# Patient Record
Sex: MALE | Race: WHITE | NOT HISPANIC OR LATINO | URBAN - METROPOLITAN AREA
[De-identification: names, ages, dates, MRNs, and addresses within clinical notes are randomized per-mention and may not be internally consistent; named-entity substitution may affect disease eponyms.]

---

## 2021-01-01 ENCOUNTER — INPATIENT (INPATIENT)
Facility: HOSPITAL | Age: 66
LOS: 1 days | Discharge: HOME | DRG: 439 | End: 2021-07-04
Attending: FAMILY MEDICINE | Admitting: STUDENT IN AN ORGANIZED HEALTH CARE EDUCATION/TRAINING PROGRAM
Payer: COMMERCIAL

## 2021-01-01 ENCOUNTER — NON-APPOINTMENT (OUTPATIENT)
Age: 66
End: 2021-01-01

## 2021-01-01 ENCOUNTER — RESULT REVIEW (OUTPATIENT)
Age: 66
End: 2021-01-01

## 2021-01-01 ENCOUNTER — INPATIENT (INPATIENT)
Facility: HOSPITAL | Age: 66
LOS: 6 days | Discharge: HOME | End: 2021-08-13
Attending: SURGERY | Admitting: SURGERY
Payer: MEDICARE

## 2021-01-01 ENCOUNTER — APPOINTMENT (OUTPATIENT)
Dept: CARDIOLOGY | Facility: CLINIC | Age: 66
End: 2021-01-01

## 2021-01-01 ENCOUNTER — APPOINTMENT (OUTPATIENT)
Dept: CT IMAGING | Facility: CLINIC | Age: 66
End: 2021-01-01

## 2021-01-01 ENCOUNTER — TRANSCRIPTION ENCOUNTER (OUTPATIENT)
Age: 66
End: 2021-01-01

## 2021-01-01 ENCOUNTER — APPOINTMENT (OUTPATIENT)
Dept: SURGICAL ONCOLOGY | Facility: CLINIC | Age: 66
End: 2021-01-01

## 2021-01-01 ENCOUNTER — APPOINTMENT (OUTPATIENT)
Dept: DISASTER EMERGENCY | Facility: CLINIC | Age: 66
End: 2021-01-01

## 2021-01-01 ENCOUNTER — APPOINTMENT (OUTPATIENT)
Dept: SURGICAL ONCOLOGY | Facility: HOSPITAL | Age: 66
End: 2021-01-01

## 2021-01-01 ENCOUNTER — APPOINTMENT (OUTPATIENT)
Dept: CARDIOLOGY | Facility: CLINIC | Age: 66
End: 2021-01-01
Payer: MEDICARE

## 2021-01-01 ENCOUNTER — APPOINTMENT (OUTPATIENT)
Dept: HEMATOLOGY ONCOLOGY | Facility: CLINIC | Age: 66
End: 2021-01-01

## 2021-01-01 ENCOUNTER — APPOINTMENT (OUTPATIENT)
Dept: SURGICAL ONCOLOGY | Facility: CLINIC | Age: 66
End: 2021-01-01
Payer: MEDICARE

## 2021-01-01 ENCOUNTER — FORM ENCOUNTER (OUTPATIENT)
Age: 66
End: 2021-01-01

## 2021-01-01 ENCOUNTER — APPOINTMENT (OUTPATIENT)
Dept: HEMATOLOGY ONCOLOGY | Facility: CLINIC | Age: 66
End: 2021-01-01
Payer: MEDICARE

## 2021-01-01 ENCOUNTER — OUTPATIENT (OUTPATIENT)
Dept: OUTPATIENT SERVICES | Facility: HOSPITAL | Age: 66
LOS: 1 days | End: 2021-01-01

## 2021-01-01 ENCOUNTER — EMERGENCY (EMERGENCY)
Facility: HOSPITAL | Age: 66
LOS: 1 days | End: 2021-01-01
Payer: MEDICARE

## 2021-01-01 ENCOUNTER — OUTPATIENT (OUTPATIENT)
Dept: OUTPATIENT SERVICES | Facility: HOSPITAL | Age: 66
LOS: 1 days | End: 2021-01-01
Payer: COMMERCIAL

## 2021-01-01 ENCOUNTER — EMERGENCY (EMERGENCY)
Facility: HOSPITAL | Age: 66
LOS: 1 days | End: 2021-01-01
Admitting: EMERGENCY MEDICINE
Payer: MEDICARE

## 2021-01-01 ENCOUNTER — INPATIENT (INPATIENT)
Facility: HOSPITAL | Age: 66
LOS: 15 days | End: 2021-10-11
Attending: SURGERY | Admitting: SURGERY
Payer: MEDICARE

## 2021-01-01 VITALS
OXYGEN SATURATION: 98 % | SYSTOLIC BLOOD PRESSURE: 135 MMHG | RESPIRATION RATE: 16 BRPM | TEMPERATURE: 99 F | DIASTOLIC BLOOD PRESSURE: 86 MMHG | HEART RATE: 79 BPM

## 2021-01-01 VITALS
HEART RATE: 96 BPM | TEMPERATURE: 98 F | OXYGEN SATURATION: 100 % | WEIGHT: 161.6 LBS | RESPIRATION RATE: 18 BRPM | SYSTOLIC BLOOD PRESSURE: 108 MMHG | DIASTOLIC BLOOD PRESSURE: 76 MMHG

## 2021-01-01 VITALS
SYSTOLIC BLOOD PRESSURE: 142 MMHG | RESPIRATION RATE: 20 BRPM | HEART RATE: 76 BPM | WEIGHT: 192.02 LBS | TEMPERATURE: 98 F | HEIGHT: 66 IN | DIASTOLIC BLOOD PRESSURE: 83 MMHG | OXYGEN SATURATION: 98 %

## 2021-01-01 VITALS
SYSTOLIC BLOOD PRESSURE: 133 MMHG | WEIGHT: 165.04 LBS | OXYGEN SATURATION: 99 % | TEMPERATURE: 96.5 F | HEIGHT: 66 IN | DIASTOLIC BLOOD PRESSURE: 97 MMHG | HEART RATE: 94 BPM | BODY MASS INDEX: 26.52 KG/M2

## 2021-01-01 VITALS
BODY MASS INDEX: 27.4 KG/M2 | WEIGHT: 170.5 LBS | TEMPERATURE: 97.9 F | DIASTOLIC BLOOD PRESSURE: 109 MMHG | SYSTOLIC BLOOD PRESSURE: 154 MMHG | HEART RATE: 82 BPM | OXYGEN SATURATION: 96 % | HEIGHT: 66 IN

## 2021-01-01 VITALS
DIASTOLIC BLOOD PRESSURE: 70 MMHG | HEIGHT: 66 IN | OXYGEN SATURATION: 97 % | HEART RATE: 60 BPM | SYSTOLIC BLOOD PRESSURE: 130 MMHG | BODY MASS INDEX: 31.98 KG/M2 | WEIGHT: 199 LBS | TEMPERATURE: 97.7 F

## 2021-01-01 VITALS
DIASTOLIC BLOOD PRESSURE: 92 MMHG | SYSTOLIC BLOOD PRESSURE: 152 MMHG | HEART RATE: 70 BPM | WEIGHT: 194.01 LBS | TEMPERATURE: 99 F | OXYGEN SATURATION: 98 % | HEIGHT: 65 IN | RESPIRATION RATE: 16 BRPM

## 2021-01-01 VITALS
DIASTOLIC BLOOD PRESSURE: 84 MMHG | HEART RATE: 71 BPM | OXYGEN SATURATION: 96 % | HEIGHT: 66 IN | SYSTOLIC BLOOD PRESSURE: 127 MMHG | WEIGHT: 190 LBS | BODY MASS INDEX: 30.53 KG/M2 | TEMPERATURE: 97.2 F

## 2021-01-01 VITALS — HEART RATE: 84 BPM | DIASTOLIC BLOOD PRESSURE: 79 MMHG | SYSTOLIC BLOOD PRESSURE: 152 MMHG

## 2021-01-01 VITALS
HEIGHT: 66 IN | RESPIRATION RATE: 16 BRPM | SYSTOLIC BLOOD PRESSURE: 205 MMHG | TEMPERATURE: 99 F | DIASTOLIC BLOOD PRESSURE: 101 MMHG | WEIGHT: 171.74 LBS | OXYGEN SATURATION: 97 % | HEART RATE: 75 BPM

## 2021-01-01 VITALS
HEART RATE: 114 BPM | WEIGHT: 167 LBS | BODY MASS INDEX: 26.84 KG/M2 | HEIGHT: 66 IN | OXYGEN SATURATION: 94 % | DIASTOLIC BLOOD PRESSURE: 98 MMHG | SYSTOLIC BLOOD PRESSURE: 150 MMHG | RESPIRATION RATE: 14 BRPM | TEMPERATURE: 99.6 F

## 2021-01-01 VITALS — OXYGEN SATURATION: 96 % | RESPIRATION RATE: 42 BRPM | HEART RATE: 116 BPM

## 2021-01-01 DIAGNOSIS — F41.9 ANXIETY DISORDER, UNSPECIFIED: ICD-10-CM

## 2021-01-01 DIAGNOSIS — Z01.810 ENCOUNTER FOR PREPROCEDURAL CARDIOVASCULAR EXAMINATION: ICD-10-CM

## 2021-01-01 DIAGNOSIS — Z87.442 PERSONAL HISTORY OF URINARY CALCULI: ICD-10-CM

## 2021-01-01 DIAGNOSIS — Z78.9 OTHER SPECIFIED HEALTH STATUS: ICD-10-CM

## 2021-01-01 DIAGNOSIS — K86.89 OTHER SPECIFIED DISEASES OF PANCREAS: ICD-10-CM

## 2021-01-01 DIAGNOSIS — K59.00 CONSTIPATION, UNSPECIFIED: ICD-10-CM

## 2021-01-01 DIAGNOSIS — Z98.890 OTHER SPECIFIED POSTPROCEDURAL STATES: Chronic | ICD-10-CM

## 2021-01-01 DIAGNOSIS — Z83.3 FAMILY HISTORY OF DIABETES MELLITUS: ICD-10-CM

## 2021-01-01 DIAGNOSIS — R11.2 NAUSEA WITH VOMITING, UNSPECIFIED: ICD-10-CM

## 2021-01-01 DIAGNOSIS — I10 ESSENTIAL (PRIMARY) HYPERTENSION: ICD-10-CM

## 2021-01-01 DIAGNOSIS — Z82.49 FAMILY HISTORY OF ISCHEMIC HEART DISEASE AND OTHER DISEASES OF THE CIRCULATORY SYSTEM: ICD-10-CM

## 2021-01-01 DIAGNOSIS — B19.20 UNSPECIFIED VIRAL HEPATITIS C W/OUT HEPATIC COMA: ICD-10-CM

## 2021-01-01 DIAGNOSIS — G93.49 OTHER ENCEPHALOPATHY: ICD-10-CM

## 2021-01-01 DIAGNOSIS — C25.0 MALIGNANT NEOPLASM OF HEAD OF PANCREAS: ICD-10-CM

## 2021-01-01 DIAGNOSIS — Z01.818 ENCOUNTER FOR OTHER PREPROCEDURAL EXAMINATION: ICD-10-CM

## 2021-01-01 DIAGNOSIS — R78.81 BACTEREMIA: ICD-10-CM

## 2021-01-01 DIAGNOSIS — Z86.19 PERSONAL HISTORY OF OTHER INFECTIOUS AND PARASITIC DISEASES: ICD-10-CM

## 2021-01-01 DIAGNOSIS — E44.0 MODERATE PROTEIN-CALORIE MALNUTRITION: ICD-10-CM

## 2021-01-01 DIAGNOSIS — Z51.5 ENCOUNTER FOR PALLIATIVE CARE: ICD-10-CM

## 2021-01-01 DIAGNOSIS — E43 UNSPECIFIED SEVERE PROTEIN-CALORIE MALNUTRITION: ICD-10-CM

## 2021-01-01 DIAGNOSIS — C25.1 MALIGNANT NEOPLASM OF BODY OF PANCREAS: ICD-10-CM

## 2021-01-01 DIAGNOSIS — C25.9 MALIGNANT NEOPLASM OF PANCREAS, UNSPECIFIED: ICD-10-CM

## 2021-01-01 DIAGNOSIS — D49.0 NEOPLASM OF UNSPECIFIED BEHAVIOR OF DIGESTIVE SYSTEM: ICD-10-CM

## 2021-01-01 DIAGNOSIS — Z81.1 FAMILY HISTORY OF ALCOHOL ABUSE AND DEPENDENCE: ICD-10-CM

## 2021-01-01 DIAGNOSIS — R53.81 OTHER MALAISE: ICD-10-CM

## 2021-01-01 DIAGNOSIS — E83.52 HYPERCALCEMIA: ICD-10-CM

## 2021-01-01 DIAGNOSIS — K83.1 OBSTRUCTION OF BILE DUCT: ICD-10-CM

## 2021-01-01 DIAGNOSIS — Z87.891 PERSONAL HISTORY OF NICOTINE DEPENDENCE: ICD-10-CM

## 2021-01-01 DIAGNOSIS — K29.70 GASTRITIS, UNSPECIFIED, W/OUT BLEEDING: ICD-10-CM

## 2021-01-01 DIAGNOSIS — R17 UNSPECIFIED JAUNDICE: ICD-10-CM

## 2021-01-01 DIAGNOSIS — Z71.89 OTHER SPECIFIED COUNSELING: ICD-10-CM

## 2021-01-01 DIAGNOSIS — A41.9 SEPSIS, UNSPECIFIED ORGANISM: ICD-10-CM

## 2021-01-01 DIAGNOSIS — K83.09 OTHER CHOLANGITIS: ICD-10-CM

## 2021-01-01 DIAGNOSIS — B96.81 GASTRITIS, UNSPECIFIED, W/OUT BLEEDING: ICD-10-CM

## 2021-01-01 DIAGNOSIS — R06.82 TACHYPNEA, NOT ELSEWHERE CLASSIFIED: ICD-10-CM

## 2021-01-01 DIAGNOSIS — Z90.410 ACQUIRED TOTAL ABSENCE OF PANCREAS: Chronic | ICD-10-CM

## 2021-01-01 DIAGNOSIS — Z86.79 PERSONAL HISTORY OF OTHER DISEASES OF THE CIRCULATORY SYSTEM: ICD-10-CM

## 2021-01-01 DIAGNOSIS — G47.00 INSOMNIA, UNSPECIFIED: ICD-10-CM

## 2021-01-01 DIAGNOSIS — R53.2 FUNCTIONAL QUADRIPLEGIA: ICD-10-CM

## 2021-01-01 LAB
-  AMIKACIN: SIGNIFICANT CHANGE UP
-  AMOXICILLIN/CLAVULANIC ACID: SIGNIFICANT CHANGE UP
-  AMPHOTERICIN B: SIGNIFICANT CHANGE UP
-  AMPICILLIN/SULBACTAM: SIGNIFICANT CHANGE UP
-  AMPICILLIN: SIGNIFICANT CHANGE UP
-  ANIDULAFUNGIN: SIGNIFICANT CHANGE UP
-  AZTREONAM: SIGNIFICANT CHANGE UP
-  CASPOFUNGIN: SIGNIFICANT CHANGE UP
-  CEFAZOLIN: SIGNIFICANT CHANGE UP
-  CEFEPIME: SIGNIFICANT CHANGE UP
-  CEFOXITIN: SIGNIFICANT CHANGE UP
-  CEFTRIAXONE: SIGNIFICANT CHANGE UP
-  CIPROFLOXACIN: SIGNIFICANT CHANGE UP
-  ERTAPENEM: SIGNIFICANT CHANGE UP
-  FLUCONAZOLE: SIGNIFICANT CHANGE UP
-  GENTAMICIN: SIGNIFICANT CHANGE UP
-  IMIPENEM: SIGNIFICANT CHANGE UP
-  INTERPRETATION: SIGNIFICANT CHANGE UP
-  LEVOFLOXACIN: SIGNIFICANT CHANGE UP
-  MEROPENEM: SIGNIFICANT CHANGE UP
-  MICAFUNGIN: SIGNIFICANT CHANGE UP
-  PIPERACILLIN/TAZOBACTAM: SIGNIFICANT CHANGE UP
-  POSACONAZOLE: SIGNIFICANT CHANGE UP
-  TOBRAMYCIN: SIGNIFICANT CHANGE UP
-  TRIMETHOPRIM/SULFAMETHOXAZOLE: SIGNIFICANT CHANGE UP
-  VORICONAZOLE: SIGNIFICANT CHANGE UP
24R-OH-CALCIDIOL SERPL-MCNC: 14.8 NG/ML — LOW (ref 30–80)
ALBUMIN SERPL ELPH-MCNC: 2 G/DL — LOW (ref 3.3–5)
ALBUMIN SERPL ELPH-MCNC: 2.1 G/DL — LOW (ref 3.3–5)
ALBUMIN SERPL ELPH-MCNC: 2.1 G/DL — LOW (ref 3.3–5)
ALBUMIN SERPL ELPH-MCNC: 2.2 G/DL — LOW (ref 3.3–5)
ALBUMIN SERPL ELPH-MCNC: 2.2 G/DL — LOW (ref 3.3–5)
ALBUMIN SERPL ELPH-MCNC: 2.4 G/DL — LOW (ref 3.3–5)
ALBUMIN SERPL ELPH-MCNC: 2.5 G/DL — LOW (ref 3.3–5)
ALBUMIN SERPL ELPH-MCNC: 2.6 G/DL — LOW (ref 3.3–5)
ALBUMIN SERPL ELPH-MCNC: 2.7 G/DL — LOW (ref 3.3–5)
ALBUMIN SERPL ELPH-MCNC: 3 G/DL — LOW (ref 3.3–5)
ALBUMIN SERPL ELPH-MCNC: 3.2 G/DL — LOW (ref 3.3–5)
ALBUMIN SERPL ELPH-MCNC: 3.3 G/DL — SIGNIFICANT CHANGE UP (ref 3.3–5)
ALBUMIN SERPL ELPH-MCNC: 3.3 G/DL — SIGNIFICANT CHANGE UP (ref 3.3–5)
ALBUMIN SERPL ELPH-MCNC: 3.4 G/DL — SIGNIFICANT CHANGE UP (ref 3.3–5)
ALBUMIN SERPL ELPH-MCNC: 3.7 G/DL — SIGNIFICANT CHANGE UP (ref 3.3–5.2)
ALBUMIN SERPL ELPH-MCNC: 3.9 G/DL — SIGNIFICANT CHANGE UP (ref 3.3–5)
ALBUMIN SERPL ELPH-MCNC: 3.9 G/DL — SIGNIFICANT CHANGE UP (ref 3.3–5.2)
ALBUMIN SERPL ELPH-MCNC: 4.2 G/DL — SIGNIFICANT CHANGE UP (ref 3.3–5)
ALBUMIN SERPL ELPH-MCNC: 4.2 G/DL — SIGNIFICANT CHANGE UP (ref 3.3–5.2)
ALBUMIN SERPL ELPH-MCNC: 4.3 G/DL — SIGNIFICANT CHANGE UP (ref 3.3–5)
ALBUMIN SERPL ELPH-MCNC: 4.8 G/DL — SIGNIFICANT CHANGE UP (ref 3.3–5)
ALP SERPL-CCNC: 109 U/L — SIGNIFICANT CHANGE UP (ref 40–120)
ALP SERPL-CCNC: 111 U/L — SIGNIFICANT CHANGE UP (ref 40–120)
ALP SERPL-CCNC: 111 U/L — SIGNIFICANT CHANGE UP (ref 40–120)
ALP SERPL-CCNC: 113 U/L — SIGNIFICANT CHANGE UP (ref 40–120)
ALP SERPL-CCNC: 114 U/L — SIGNIFICANT CHANGE UP (ref 40–120)
ALP SERPL-CCNC: 117 U/L — SIGNIFICANT CHANGE UP (ref 40–120)
ALP SERPL-CCNC: 118 U/L — SIGNIFICANT CHANGE UP (ref 40–120)
ALP SERPL-CCNC: 121 U/L — HIGH (ref 40–120)
ALP SERPL-CCNC: 122 U/L — HIGH (ref 40–120)
ALP SERPL-CCNC: 126 U/L — HIGH (ref 40–120)
ALP SERPL-CCNC: 132 U/L — HIGH (ref 40–120)
ALP SERPL-CCNC: 133 U/L — HIGH (ref 40–120)
ALP SERPL-CCNC: 155 U/L — HIGH (ref 40–120)
ALP SERPL-CCNC: 163 U/L — HIGH (ref 40–120)
ALP SERPL-CCNC: 180 U/L — HIGH (ref 40–120)
ALP SERPL-CCNC: 199 U/L — HIGH (ref 40–120)
ALP SERPL-CCNC: 205 U/L — HIGH (ref 40–120)
ALP SERPL-CCNC: 264 U/L — HIGH (ref 40–120)
ALP SERPL-CCNC: 267 U/L — HIGH (ref 40–120)
ALP SERPL-CCNC: 268 U/L — HIGH (ref 40–120)
ALP SERPL-CCNC: 287 U/L — HIGH (ref 40–120)
ALP SERPL-CCNC: 54 U/L — SIGNIFICANT CHANGE UP (ref 40–120)
ALP SERPL-CCNC: 56 U/L — SIGNIFICANT CHANGE UP (ref 40–120)
ALP SERPL-CCNC: 59 U/L — SIGNIFICANT CHANGE UP (ref 40–120)
ALP SERPL-CCNC: 60 U/L — SIGNIFICANT CHANGE UP (ref 40–120)
ALP SERPL-CCNC: 65 U/L — SIGNIFICANT CHANGE UP (ref 40–120)
ALP SERPL-CCNC: 80 U/L — SIGNIFICANT CHANGE UP (ref 40–120)
ALP SERPL-CCNC: 89 U/L — SIGNIFICANT CHANGE UP (ref 40–120)
ALP SERPL-CCNC: 96 U/L — SIGNIFICANT CHANGE UP (ref 40–120)
ALT FLD-CCNC: 106 U/L — HIGH (ref 4–41)
ALT FLD-CCNC: 11 U/L — SIGNIFICANT CHANGE UP (ref 10–45)
ALT FLD-CCNC: 114 U/L — HIGH (ref 4–41)
ALT FLD-CCNC: 115 U/L — HIGH (ref 4–41)
ALT FLD-CCNC: 129 U/L — HIGH (ref 4–41)
ALT FLD-CCNC: 142 U/L — HIGH (ref 4–41)
ALT FLD-CCNC: 184 U/L — HIGH (ref 4–41)
ALT FLD-CCNC: 184 U/L — HIGH (ref 4–41)
ALT FLD-CCNC: 239 U/L — HIGH (ref 4–41)
ALT FLD-CCNC: 242 U/L — HIGH (ref 4–41)
ALT FLD-CCNC: 247 U/L — HIGH (ref 4–41)
ALT FLD-CCNC: 309 U/L — HIGH (ref 4–41)
ALT FLD-CCNC: 315 U/L — HIGH (ref 4–41)
ALT FLD-CCNC: 363 U/L — HIGH (ref 4–41)
ALT FLD-CCNC: 38 U/L — SIGNIFICANT CHANGE UP (ref 4–41)
ALT FLD-CCNC: 385 U/L — HIGH (ref 4–41)
ALT FLD-CCNC: 388 U/L — HIGH (ref 4–41)
ALT FLD-CCNC: 39 U/L — SIGNIFICANT CHANGE UP (ref 4–41)
ALT FLD-CCNC: 43 U/L — HIGH (ref 4–41)
ALT FLD-CCNC: 508 U/L — HIGH
ALT FLD-CCNC: 51 U/L — HIGH (ref 4–41)
ALT FLD-CCNC: 530 U/L — HIGH (ref 4–41)
ALT FLD-CCNC: 54 U/L — HIGH (ref 4–41)
ALT FLD-CCNC: 549 U/L — HIGH (ref 4–41)
ALT FLD-CCNC: 622 U/L — HIGH
ALT FLD-CCNC: 63 U/L — HIGH (ref 4–41)
ALT FLD-CCNC: 69 U/L — HIGH (ref 4–41)
ALT FLD-CCNC: 736 U/L — HIGH
ALT FLD-CCNC: 89 U/L — HIGH (ref 4–41)
ALT FLD-CCNC: 91 U/L — HIGH (ref 4–41)
ALT FLD-CCNC: 93 U/L — HIGH (ref 4–41)
AMMONIA BLD-MCNC: 38 UMOL/L — SIGNIFICANT CHANGE UP (ref 11–55)
AMYLASE FLD-CCNC: 12 U/L — SIGNIFICANT CHANGE UP
AMYLASE FLD-CCNC: 22 U/L — SIGNIFICANT CHANGE UP
AMYLASE FLD-CCNC: 66 U/L — SIGNIFICANT CHANGE UP
ANION GAP SERPL CALC-SCNC: 10 MMOL/L — SIGNIFICANT CHANGE UP (ref 7–14)
ANION GAP SERPL CALC-SCNC: 11 MMOL/L — SIGNIFICANT CHANGE UP (ref 7–14)
ANION GAP SERPL CALC-SCNC: 12 MMOL/L — SIGNIFICANT CHANGE UP (ref 5–17)
ANION GAP SERPL CALC-SCNC: 12 MMOL/L — SIGNIFICANT CHANGE UP (ref 7–14)
ANION GAP SERPL CALC-SCNC: 13 MMOL/L — SIGNIFICANT CHANGE UP (ref 5–17)
ANION GAP SERPL CALC-SCNC: 13 MMOL/L — SIGNIFICANT CHANGE UP (ref 5–17)
ANION GAP SERPL CALC-SCNC: 13 MMOL/L — SIGNIFICANT CHANGE UP (ref 7–14)
ANION GAP SERPL CALC-SCNC: 14 MMOL/L — SIGNIFICANT CHANGE UP (ref 7–14)
ANION GAP SERPL CALC-SCNC: 14 MMOL/L — SIGNIFICANT CHANGE UP (ref 7–14)
ANION GAP SERPL CALC-SCNC: 15 MMOL/L — HIGH (ref 7–14)
ANION GAP SERPL CALC-SCNC: 16 MMOL/L — SIGNIFICANT CHANGE UP (ref 5–17)
ANION GAP SERPL CALC-SCNC: 17 MMOL/L — HIGH (ref 7–14)
ANION GAP SERPL CALC-SCNC: 18 MMOL/L — HIGH (ref 7–14)
ANION GAP SERPL CALC-SCNC: 18 MMOL/L — HIGH (ref 7–14)
ANION GAP SERPL CALC-SCNC: 19 MMOL/L — HIGH (ref 7–14)
ANION GAP SERPL CALC-SCNC: 20 MMOL/L — HIGH (ref 7–14)
ANION GAP SERPL CALC-SCNC: 20 MMOL/L — HIGH (ref 7–14)
ANION GAP SERPL CALC-SCNC: 22 MMOL/L — HIGH (ref 7–14)
ANION GAP SERPL CALC-SCNC: 24 MMOL/L — HIGH (ref 7–14)
ANION GAP SERPL CALC-SCNC: 24 MMOL/L — HIGH (ref 7–14)
ANION GAP SERPL CALC-SCNC: 7 MMOL/L — SIGNIFICANT CHANGE UP (ref 7–14)
ANION GAP SERPL CALC-SCNC: 8 MMOL/L — SIGNIFICANT CHANGE UP (ref 7–14)
ANISOCYTOSIS BLD QL: SLIGHT — SIGNIFICANT CHANGE UP
APTT BLD: 23.7 SEC — LOW (ref 27–36.3)
APTT BLD: 24.1 SEC — SIGNIFICANT CHANGE UP (ref 27–36.3)
APTT BLD: 24.8 SEC — LOW (ref 27–36.3)
APTT BLD: 25.2 SEC — LOW (ref 27–36.3)
APTT BLD: 25.2 SEC — LOW (ref 27–36.3)
APTT BLD: 25.8 SEC — LOW (ref 27–36.3)
APTT BLD: 28.7 SEC — SIGNIFICANT CHANGE UP (ref 27–36.3)
APTT BLD: 28.8 SEC — SIGNIFICANT CHANGE UP (ref 27–36.3)
APTT BLD: 28.9 SEC — SIGNIFICANT CHANGE UP (ref 27–36.3)
APTT BLD: 29 SEC — SIGNIFICANT CHANGE UP (ref 27–36.3)
APTT BLD: 30.7 SEC — SIGNIFICANT CHANGE UP (ref 27–36.3)
APTT BLD: 32.4 SEC — SIGNIFICANT CHANGE UP (ref 27–36.3)
APTT BLD: 32.5 SEC — SIGNIFICANT CHANGE UP (ref 27.5–35.5)
APTT BLD: 33.5 SEC — SIGNIFICANT CHANGE UP (ref 27.5–35.5)
AST SERPL-CCNC: 106 U/L — HIGH (ref 4–40)
AST SERPL-CCNC: 159 U/L — HIGH (ref 4–40)
AST SERPL-CCNC: 164 U/L — HIGH (ref 4–40)
AST SERPL-CCNC: 166 U/L — HIGH (ref 4–40)
AST SERPL-CCNC: 17 U/L — SIGNIFICANT CHANGE UP (ref 10–40)
AST SERPL-CCNC: 192 U/L — HIGH (ref 4–40)
AST SERPL-CCNC: 220 U/L — HIGH (ref 4–40)
AST SERPL-CCNC: 225 U/L — HIGH (ref 4–40)
AST SERPL-CCNC: 246 U/L — HIGH (ref 4–40)
AST SERPL-CCNC: 247 U/L — HIGH (ref 4–40)
AST SERPL-CCNC: 262 U/L — HIGH
AST SERPL-CCNC: 333 U/L — HIGH (ref 4–40)
AST SERPL-CCNC: 335 U/L — HIGH (ref 4–40)
AST SERPL-CCNC: 35 U/L — SIGNIFICANT CHANGE UP (ref 4–40)
AST SERPL-CCNC: 377 U/L — HIGH (ref 4–40)
AST SERPL-CCNC: 388 U/L — HIGH (ref 4–40)
AST SERPL-CCNC: 39 U/L — SIGNIFICANT CHANGE UP (ref 4–40)
AST SERPL-CCNC: 393 U/L — HIGH
AST SERPL-CCNC: 434 U/L — HIGH
AST SERPL-CCNC: 449 U/L — HIGH (ref 4–40)
AST SERPL-CCNC: 468 U/L — HIGH (ref 4–40)
AST SERPL-CCNC: 49 U/L — HIGH (ref 4–40)
AST SERPL-CCNC: 49 U/L — HIGH (ref 4–40)
AST SERPL-CCNC: 501 U/L — HIGH (ref 4–40)
AST SERPL-CCNC: 51 U/L — HIGH (ref 4–40)
AST SERPL-CCNC: 52 U/L — HIGH (ref 4–40)
AST SERPL-CCNC: 678 U/L — HIGH (ref 4–40)
AST SERPL-CCNC: 70 U/L — HIGH (ref 4–40)
AST SERPL-CCNC: 72 U/L — HIGH (ref 4–40)
AST SERPL-CCNC: 77 U/L — HIGH (ref 4–40)
AST SERPL-CCNC: 86 U/L — HIGH (ref 4–40)
BASOPHILS # BLD AUTO: 0 K/UL — SIGNIFICANT CHANGE UP (ref 0–0.2)
BASOPHILS # BLD AUTO: 0.03 K/UL — SIGNIFICANT CHANGE UP (ref 0–0.2)
BASOPHILS # BLD AUTO: 0.04 K/UL — SIGNIFICANT CHANGE UP (ref 0–0.2)
BASOPHILS # BLD AUTO: 0.04 K/UL — SIGNIFICANT CHANGE UP (ref 0–0.2)
BASOPHILS # BLD AUTO: 0.06 K/UL — SIGNIFICANT CHANGE UP (ref 0–0.2)
BASOPHILS NFR BLD AUTO: 0 % — SIGNIFICANT CHANGE UP (ref 0–2)
BASOPHILS NFR BLD AUTO: 0.1 % — SIGNIFICANT CHANGE UP (ref 0–2)
BASOPHILS NFR BLD AUTO: 0.1 % — SIGNIFICANT CHANGE UP (ref 0–2)
BASOPHILS NFR BLD AUTO: 0.2 % — SIGNIFICANT CHANGE UP (ref 0–2)
BASOPHILS NFR BLD AUTO: 0.3 % — SIGNIFICANT CHANGE UP (ref 0–2)
BASOPHILS NFR BLD AUTO: 0.7 % — SIGNIFICANT CHANGE UP (ref 0–2)
BASOPHILS NFR BLD AUTO: 0.9 % — SIGNIFICANT CHANGE UP (ref 0–2)
BILIRUB DIRECT SERPL-MCNC: 3.2 MG/DL — HIGH (ref 0–0.2)
BILIRUB DIRECT SERPL-MCNC: 4.2 MG/DL — HIGH (ref 0–0.2)
BILIRUB DIRECT SERPL-MCNC: 4.4 MG/DL — HIGH (ref 0–0.2)
BILIRUB DIRECT SERPL-MCNC: 4.7 MG/DL — HIGH (ref 0–0.2)
BILIRUB DIRECT SERPL-MCNC: 7 MG/DL — HIGH (ref 0–0.2)
BILIRUB DIRECT SERPL-MCNC: 7.9 MG/DL — HIGH (ref 0–0.3)
BILIRUB INDIRECT FLD-MCNC: 1.4 MG/DL — HIGH (ref 0–1)
BILIRUB INDIRECT FLD-MCNC: 1.7 MG/DL — HIGH (ref 0–1)
BILIRUB INDIRECT FLD-MCNC: 1.8 MG/DL — HIGH (ref 0–1)
BILIRUB INDIRECT FLD-MCNC: 2.9 MG/DL — HIGH (ref 0–1)
BILIRUB INDIRECT FLD-MCNC: 3.4 MG/DL — HIGH (ref 0.2–1)
BILIRUB SERPL-MCNC: 1 MG/DL — SIGNIFICANT CHANGE UP (ref 0.2–1.2)
BILIRUB SERPL-MCNC: 1.1 MG/DL — SIGNIFICANT CHANGE UP (ref 0.2–1.2)
BILIRUB SERPL-MCNC: 1.2 MG/DL — SIGNIFICANT CHANGE UP (ref 0.2–1.2)
BILIRUB SERPL-MCNC: 1.2 MG/DL — SIGNIFICANT CHANGE UP (ref 0.2–1.2)
BILIRUB SERPL-MCNC: 1.5 MG/DL — HIGH (ref 0.2–1.2)
BILIRUB SERPL-MCNC: 1.8 MG/DL — HIGH (ref 0.2–1.2)
BILIRUB SERPL-MCNC: 10.5 MG/DL — HIGH (ref 0.2–1.2)
BILIRUB SERPL-MCNC: 10.6 MG/DL — HIGH (ref 0.2–1.2)
BILIRUB SERPL-MCNC: 11.2 MG/DL — HIGH (ref 0.4–2)
BILIRUB SERPL-MCNC: 2.1 MG/DL — HIGH (ref 0.2–1.2)
BILIRUB SERPL-MCNC: 2.6 MG/DL — HIGH (ref 0.2–1.2)
BILIRUB SERPL-MCNC: 5.1 MG/DL — HIGH (ref 0.4–2)
BILIRUB SERPL-MCNC: 5.3 MG/DL — HIGH (ref 0.2–1.2)
BILIRUB SERPL-MCNC: 5.6 MG/DL — HIGH (ref 0.2–1.2)
BILIRUB SERPL-MCNC: 5.7 MG/DL — HIGH (ref 0.2–1.2)
BILIRUB SERPL-MCNC: 5.8 MG/DL — HIGH (ref 0.2–1.2)
BILIRUB SERPL-MCNC: 6.1 MG/DL — HIGH (ref 0.2–1.2)
BILIRUB SERPL-MCNC: 6.2 MG/DL — HIGH (ref 0.2–1.2)
BILIRUB SERPL-MCNC: 6.2 MG/DL — HIGH (ref 0.2–1.2)
BILIRUB SERPL-MCNC: 6.4 MG/DL — HIGH (ref 0.2–1.2)
BILIRUB SERPL-MCNC: 6.5 MG/DL — HIGH (ref 0.2–1.2)
BILIRUB SERPL-MCNC: 6.7 MG/DL — HIGH (ref 0.2–1.2)
BILIRUB SERPL-MCNC: 7.2 MG/DL — HIGH (ref 0.2–1.2)
BILIRUB SERPL-MCNC: 7.4 MG/DL — HIGH (ref 0.2–1.2)
BILIRUB SERPL-MCNC: 7.7 MG/DL — HIGH (ref 0.2–1.2)
BILIRUB SERPL-MCNC: 8 MG/DL — HIGH (ref 0.2–1.2)
BILIRUB SERPL-MCNC: 8.3 MG/DL — HIGH (ref 0.2–1.2)
BILIRUB SERPL-MCNC: 8.6 MG/DL — HIGH (ref 0.2–1.2)
BILIRUB SERPL-MCNC: 9.4 MG/DL — HIGH (ref 0.4–2)
BLD GP AB SCN SERPL QL: NEGATIVE — SIGNIFICANT CHANGE UP
BLD GP AB SCN SERPL QL: SIGNIFICANT CHANGE UP
BLOOD GAS ARTERIAL - LYTES,HGB,ICA,LACT RESULT: SIGNIFICANT CHANGE UP
BLOOD GAS ARTERIAL - LYTES,HGB,ICA,LACT RESULT: SIGNIFICANT CHANGE UP
BLOOD GAS ARTERIAL COMPREHENSIVE RESULT: SIGNIFICANT CHANGE UP
BLOOD GAS ARTERIAL COMPREHENSIVE RESULT: SIGNIFICANT CHANGE UP
BUN SERPL-MCNC: 10 MG/DL — SIGNIFICANT CHANGE UP (ref 7–23)
BUN SERPL-MCNC: 11 MG/DL — SIGNIFICANT CHANGE UP (ref 7–23)
BUN SERPL-MCNC: 12 MG/DL — SIGNIFICANT CHANGE UP (ref 7–23)
BUN SERPL-MCNC: 12 MG/DL — SIGNIFICANT CHANGE UP (ref 7–23)
BUN SERPL-MCNC: 12.8 MG/DL — SIGNIFICANT CHANGE UP (ref 8–20)
BUN SERPL-MCNC: 13 MG/DL — SIGNIFICANT CHANGE UP (ref 7–23)
BUN SERPL-MCNC: 14 MG/DL — SIGNIFICANT CHANGE UP (ref 7–23)
BUN SERPL-MCNC: 14.2 MG/DL — SIGNIFICANT CHANGE UP (ref 8–20)
BUN SERPL-MCNC: 15 MG/DL — SIGNIFICANT CHANGE UP (ref 7–23)
BUN SERPL-MCNC: 15 MG/DL — SIGNIFICANT CHANGE UP (ref 7–23)
BUN SERPL-MCNC: 16 MG/DL — SIGNIFICANT CHANGE UP (ref 7–23)
BUN SERPL-MCNC: 16 MG/DL — SIGNIFICANT CHANGE UP (ref 7–23)
BUN SERPL-MCNC: 17 MG/DL — SIGNIFICANT CHANGE UP (ref 7–23)
BUN SERPL-MCNC: 17 MG/DL — SIGNIFICANT CHANGE UP (ref 7–23)
BUN SERPL-MCNC: 17.9 MG/DL — SIGNIFICANT CHANGE UP (ref 8–20)
BUN SERPL-MCNC: 18 MG/DL — SIGNIFICANT CHANGE UP (ref 7–23)
BUN SERPL-MCNC: 18 MG/DL — SIGNIFICANT CHANGE UP (ref 7–23)
BUN SERPL-MCNC: 19 MG/DL — SIGNIFICANT CHANGE UP (ref 7–23)
BUN SERPL-MCNC: 20 MG/DL — SIGNIFICANT CHANGE UP (ref 7–23)
BUN SERPL-MCNC: 22 MG/DL — SIGNIFICANT CHANGE UP (ref 7–23)
BUN SERPL-MCNC: 25 MG/DL — HIGH (ref 7–23)
BUN SERPL-MCNC: 5 MG/DL — LOW (ref 7–23)
BUN SERPL-MCNC: 6 MG/DL — LOW (ref 7–23)
BUN SERPL-MCNC: 6 MG/DL — LOW (ref 7–23)
BUN SERPL-MCNC: 7 MG/DL — SIGNIFICANT CHANGE UP (ref 7–23)
BUN SERPL-MCNC: 7 MG/DL — SIGNIFICANT CHANGE UP (ref 7–23)
BUN SERPL-MCNC: 8 MG/DL — SIGNIFICANT CHANGE UP (ref 7–23)
BUN SERPL-MCNC: 9 MG/DL — SIGNIFICANT CHANGE UP (ref 7–23)
CA-I BLD-SCNC: 1.66 MMOL/L — HIGH (ref 1.15–1.29)
CA-I BLD-SCNC: 1.8 MMOL/L — HIGH (ref 1.15–1.29)
CA-I BLD-SCNC: 1.83 MMOL/L — HIGH (ref 1.15–1.29)
CA-I BLD-SCNC: 1.87 MMOL/L — HIGH (ref 1.15–1.29)
CA-I BLD-SCNC: 2.03 MMOL/L — HIGH (ref 1.15–1.29)
CA-I BLD-SCNC: 2.13 MMOL/L — HIGH (ref 1.15–1.29)
CALCIUM SERPL-MCNC: 10.3 MG/DL — SIGNIFICANT CHANGE UP (ref 8.4–10.5)
CALCIUM SERPL-MCNC: 10.7 MG/DL — HIGH (ref 8.4–10.5)
CALCIUM SERPL-MCNC: 11.7 MG/DL — HIGH (ref 8.4–10.5)
CALCIUM SERPL-MCNC: 11.8 MG/DL — HIGH (ref 8.4–10.5)
CALCIUM SERPL-MCNC: 11.8 MG/DL — HIGH (ref 8.4–10.5)
CALCIUM SERPL-MCNC: 12.3 MG/DL — HIGH (ref 8.4–10.5)
CALCIUM SERPL-MCNC: 12.4 MG/DL — HIGH (ref 8.4–10.5)
CALCIUM SERPL-MCNC: 12.4 MG/DL — HIGH (ref 8.4–10.5)
CALCIUM SERPL-MCNC: 12.4 — SIGNIFICANT CHANGE UP
CALCIUM SERPL-MCNC: 12.5 MG/DL — HIGH (ref 8.4–10.5)
CALCIUM SERPL-MCNC: 12.6 MG/DL — HIGH (ref 8.4–10.5)
CALCIUM SERPL-MCNC: 13 MG/DL — CRITICAL HIGH (ref 8.4–10.5)
CALCIUM SERPL-MCNC: 13.1 MG/DL — CRITICAL HIGH (ref 8.4–10.5)
CALCIUM SERPL-MCNC: 13.2 MG/DL — CRITICAL HIGH (ref 8.4–10.5)
CALCIUM SERPL-MCNC: 13.3 MG/DL — CRITICAL HIGH (ref 8.4–10.5)
CALCIUM SERPL-MCNC: 13.3 MG/DL — CRITICAL HIGH (ref 8.4–10.5)
CALCIUM SERPL-MCNC: 13.5 MG/DL — CRITICAL HIGH (ref 8.4–10.5)
CALCIUM SERPL-MCNC: 13.8 MG/DL — CRITICAL HIGH (ref 8.4–10.5)
CALCIUM SERPL-MCNC: 14.3 MG/DL — CRITICAL HIGH (ref 8.4–10.5)
CALCIUM SERPL-MCNC: 8.4 MG/DL — SIGNIFICANT CHANGE UP (ref 8.4–10.5)
CALCIUM SERPL-MCNC: 8.4 MG/DL — SIGNIFICANT CHANGE UP (ref 8.4–10.5)
CALCIUM SERPL-MCNC: 8.5 MG/DL — LOW (ref 8.6–10.2)
CALCIUM SERPL-MCNC: 8.5 MG/DL — SIGNIFICANT CHANGE UP (ref 8.4–10.5)
CALCIUM SERPL-MCNC: 8.5 MG/DL — SIGNIFICANT CHANGE UP (ref 8.4–10.5)
CALCIUM SERPL-MCNC: 8.6 MG/DL — SIGNIFICANT CHANGE UP (ref 8.4–10.5)
CALCIUM SERPL-MCNC: 8.7 MG/DL — SIGNIFICANT CHANGE UP (ref 8.4–10.5)
CALCIUM SERPL-MCNC: 8.7 MG/DL — SIGNIFICANT CHANGE UP (ref 8.4–10.5)
CALCIUM SERPL-MCNC: 8.8 MG/DL — SIGNIFICANT CHANGE UP (ref 8.4–10.5)
CALCIUM SERPL-MCNC: 8.9 MG/DL — SIGNIFICANT CHANGE UP (ref 8.4–10.5)
CALCIUM SERPL-MCNC: 9.1 MG/DL — SIGNIFICANT CHANGE UP (ref 8.4–10.5)
CALCIUM SERPL-MCNC: 9.3 MG/DL — SIGNIFICANT CHANGE UP (ref 8.4–10.5)
CALCIUM SERPL-MCNC: 9.3 MG/DL — SIGNIFICANT CHANGE UP (ref 8.4–10.5)
CALCIUM SERPL-MCNC: 9.4 MG/DL — SIGNIFICANT CHANGE UP (ref 8.4–10.5)
CALCIUM SERPL-MCNC: 9.4 MG/DL — SIGNIFICANT CHANGE UP (ref 8.4–10.5)
CALCIUM SERPL-MCNC: 9.4 MG/DL — SIGNIFICANT CHANGE UP (ref 8.6–10.2)
CALCIUM SERPL-MCNC: 9.5 MG/DL — SIGNIFICANT CHANGE UP (ref 8.6–10.2)
CALCIUM SERPL-MCNC: 9.6 MG/DL — SIGNIFICANT CHANGE UP (ref 8.4–10.5)
CALCIUM SERPL-MCNC: 9.6 MG/DL — SIGNIFICANT CHANGE UP (ref 8.4–10.5)
CALCIUM SERPL-MCNC: 9.9 MG/DL — SIGNIFICANT CHANGE UP (ref 8.4–10.5)
CANCER AG19-9 SERPL-ACNC: 110 U/ML — HIGH
CANCER AG19-9 SERPL-ACNC: 466 U/ML — HIGH
CANCER AG19-9 SERPL-ACNC: 654 U/ML — HIGH
CEA SERPL-MCNC: 6.1 NG/ML — HIGH (ref 1–3.8)
CHLORIDE SERPL-SCNC: 100 MMOL/L — SIGNIFICANT CHANGE UP (ref 98–107)
CHLORIDE SERPL-SCNC: 101 MMOL/L — SIGNIFICANT CHANGE UP (ref 98–107)
CHLORIDE SERPL-SCNC: 102 MMOL/L — SIGNIFICANT CHANGE UP (ref 98–107)
CHLORIDE SERPL-SCNC: 103 MMOL/L — SIGNIFICANT CHANGE UP (ref 98–107)
CHLORIDE SERPL-SCNC: 104 MMOL/L — SIGNIFICANT CHANGE UP (ref 98–107)
CHLORIDE SERPL-SCNC: 105 MMOL/L — SIGNIFICANT CHANGE UP (ref 98–107)
CHLORIDE SERPL-SCNC: 106 MMOL/L — SIGNIFICANT CHANGE UP (ref 98–107)
CHLORIDE SERPL-SCNC: 107 MMOL/L — SIGNIFICANT CHANGE UP (ref 96–108)
CHLORIDE SERPL-SCNC: 107 MMOL/L — SIGNIFICANT CHANGE UP (ref 98–107)
CHLORIDE SERPL-SCNC: 108 MMOL/L — HIGH (ref 98–107)
CHLORIDE SERPL-SCNC: 97 MMOL/L — LOW (ref 98–107)
CHLORIDE SERPL-SCNC: 97 MMOL/L — LOW (ref 98–107)
CHLORIDE SERPL-SCNC: 99 MMOL/L — SIGNIFICANT CHANGE UP (ref 98–107)
CO2 SERPL-SCNC: 15 MMOL/L — LOW (ref 22–31)
CO2 SERPL-SCNC: 16 MMOL/L — LOW (ref 22–31)
CO2 SERPL-SCNC: 19 MMOL/L — LOW (ref 22–29)
CO2 SERPL-SCNC: 19 MMOL/L — LOW (ref 22–31)
CO2 SERPL-SCNC: 20 MMOL/L — LOW (ref 22–31)
CO2 SERPL-SCNC: 21 MMOL/L — LOW (ref 22–31)
CO2 SERPL-SCNC: 22 MMOL/L — SIGNIFICANT CHANGE UP (ref 22–29)
CO2 SERPL-SCNC: 22 MMOL/L — SIGNIFICANT CHANGE UP (ref 22–29)
CO2 SERPL-SCNC: 22 MMOL/L — SIGNIFICANT CHANGE UP (ref 22–31)
CO2 SERPL-SCNC: 23 MMOL/L — SIGNIFICANT CHANGE UP (ref 22–31)
CO2 SERPL-SCNC: 24 MMOL/L — SIGNIFICANT CHANGE UP (ref 22–31)
CO2 SERPL-SCNC: 24 MMOL/L — SIGNIFICANT CHANGE UP (ref 22–31)
CO2 SERPL-SCNC: 25 MMOL/L — SIGNIFICANT CHANGE UP (ref 22–31)
CO2 SERPL-SCNC: 26 MMOL/L — SIGNIFICANT CHANGE UP (ref 22–31)
COVID-19 SPIKE DOMAIN AB INTERP: NEGATIVE — SIGNIFICANT CHANGE UP
COVID-19 SPIKE DOMAIN AB INTERP: POSITIVE
COVID-19 SPIKE DOMAIN ANTIBODY RESULT: 0.4 U/ML — SIGNIFICANT CHANGE UP
COVID-19 SPIKE DOMAIN ANTIBODY RESULT: 28.3 U/ML — HIGH
CREAT SERPL-MCNC: 0.4 MG/DL — LOW (ref 0.5–1.3)
CREAT SERPL-MCNC: 0.43 MG/DL — LOW (ref 0.5–1.3)
CREAT SERPL-MCNC: 0.44 MG/DL — LOW (ref 0.5–1.3)
CREAT SERPL-MCNC: 0.45 MG/DL — LOW (ref 0.5–1.3)
CREAT SERPL-MCNC: 0.46 MG/DL — LOW (ref 0.5–1.3)
CREAT SERPL-MCNC: 0.46 MG/DL — LOW (ref 0.5–1.3)
CREAT SERPL-MCNC: 0.47 MG/DL — LOW (ref 0.5–1.3)
CREAT SERPL-MCNC: 0.47 MG/DL — LOW (ref 0.5–1.3)
CREAT SERPL-MCNC: 0.49 MG/DL — LOW (ref 0.5–1.3)
CREAT SERPL-MCNC: 0.49 MG/DL — LOW (ref 0.5–1.3)
CREAT SERPL-MCNC: 0.5 MG/DL — SIGNIFICANT CHANGE UP (ref 0.5–1.3)
CREAT SERPL-MCNC: 0.51 MG/DL — SIGNIFICANT CHANGE UP (ref 0.5–1.3)
CREAT SERPL-MCNC: 0.52 MG/DL — SIGNIFICANT CHANGE UP (ref 0.5–1.3)
CREAT SERPL-MCNC: 0.53 MG/DL — SIGNIFICANT CHANGE UP (ref 0.5–1.3)
CREAT SERPL-MCNC: 0.55 MG/DL — SIGNIFICANT CHANGE UP (ref 0.5–1.3)
CREAT SERPL-MCNC: 0.55 MG/DL — SIGNIFICANT CHANGE UP (ref 0.5–1.3)
CREAT SERPL-MCNC: 0.57 MG/DL — SIGNIFICANT CHANGE UP (ref 0.5–1.3)
CREAT SERPL-MCNC: 0.58 MG/DL — SIGNIFICANT CHANGE UP (ref 0.5–1.3)
CREAT SERPL-MCNC: 0.58 MG/DL — SIGNIFICANT CHANGE UP (ref 0.5–1.3)
CREAT SERPL-MCNC: 0.59 MG/DL — SIGNIFICANT CHANGE UP (ref 0.5–1.3)
CREAT SERPL-MCNC: 0.6 MG/DL — SIGNIFICANT CHANGE UP (ref 0.5–1.3)
CREAT SERPL-MCNC: 0.61 MG/DL — SIGNIFICANT CHANGE UP (ref 0.5–1.3)
CREAT SERPL-MCNC: 0.62 MG/DL — SIGNIFICANT CHANGE UP (ref 0.5–1.3)
CREAT SERPL-MCNC: 0.63 MG/DL — SIGNIFICANT CHANGE UP (ref 0.5–1.3)
CREAT SERPL-MCNC: 0.65 MG/DL — SIGNIFICANT CHANGE UP (ref 0.5–1.3)
CREAT SERPL-MCNC: 0.65 MG/DL — SIGNIFICANT CHANGE UP (ref 0.5–1.3)
CREAT SERPL-MCNC: 0.66 MG/DL — SIGNIFICANT CHANGE UP (ref 0.5–1.3)
CREAT SERPL-MCNC: 0.67 MG/DL — SIGNIFICANT CHANGE UP (ref 0.5–1.3)
CREAT SERPL-MCNC: 0.69 MG/DL — SIGNIFICANT CHANGE UP (ref 0.5–1.3)
CREAT SERPL-MCNC: 0.7 MG/DL — SIGNIFICANT CHANGE UP (ref 0.5–1.3)
CREAT SERPL-MCNC: 0.71 MG/DL — SIGNIFICANT CHANGE UP (ref 0.5–1.3)
CREAT SERPL-MCNC: 0.72 MG/DL — SIGNIFICANT CHANGE UP (ref 0.5–1.3)
CREAT SERPL-MCNC: 0.72 MG/DL — SIGNIFICANT CHANGE UP (ref 0.5–1.3)
CREAT SERPL-MCNC: 0.73 MG/DL — SIGNIFICANT CHANGE UP (ref 0.5–1.3)
CREAT SERPL-MCNC: 0.73 MG/DL — SIGNIFICANT CHANGE UP (ref 0.5–1.3)
CREAT SERPL-MCNC: 0.8 MG/DL — SIGNIFICANT CHANGE UP (ref 0.5–1.3)
CREAT SERPL-MCNC: 0.85 MG/DL — SIGNIFICANT CHANGE UP (ref 0.5–1.3)
CULTURE RESULTS: SIGNIFICANT CHANGE UP
EOSINOPHIL # BLD AUTO: 0 K/UL — SIGNIFICANT CHANGE UP (ref 0–0.5)
EOSINOPHIL # BLD AUTO: 0.01 K/UL — SIGNIFICANT CHANGE UP (ref 0–0.5)
EOSINOPHIL # BLD AUTO: 0.02 K/UL — SIGNIFICANT CHANGE UP (ref 0–0.5)
EOSINOPHIL # BLD AUTO: 0.06 K/UL — SIGNIFICANT CHANGE UP (ref 0–0.5)
EOSINOPHIL # BLD AUTO: 0.2 K/UL — SIGNIFICANT CHANGE UP (ref 0–0.5)
EOSINOPHIL # BLD AUTO: 0.29 K/UL — SIGNIFICANT CHANGE UP (ref 0–0.5)
EOSINOPHIL # BLD AUTO: 0.3 K/UL — SIGNIFICANT CHANGE UP (ref 0–0.5)
EOSINOPHIL NFR BLD AUTO: 0 % — SIGNIFICANT CHANGE UP (ref 0–6)
EOSINOPHIL NFR BLD AUTO: 0 % — SIGNIFICANT CHANGE UP (ref 0–6)
EOSINOPHIL NFR BLD AUTO: 0.1 % — SIGNIFICANT CHANGE UP (ref 0–6)
EOSINOPHIL NFR BLD AUTO: 0.5 % — SIGNIFICANT CHANGE UP (ref 0–6)
EOSINOPHIL NFR BLD AUTO: 0.9 % — SIGNIFICANT CHANGE UP (ref 0–6)
EOSINOPHIL NFR BLD AUTO: 2.9 % — SIGNIFICANT CHANGE UP (ref 0–6)
EOSINOPHIL NFR BLD AUTO: 5.4 % — SIGNIFICANT CHANGE UP (ref 0–6)
GAS PNL BLDA: SIGNIFICANT CHANGE UP
GIANT PLATELETS BLD QL SMEAR: PRESENT — SIGNIFICANT CHANGE UP
GLUCOSE BLDC GLUCOMTR-MCNC: 124 MG/DL — HIGH (ref 70–99)
GLUCOSE BLDC GLUCOMTR-MCNC: 154 MG/DL — HIGH (ref 70–99)
GLUCOSE BLDC GLUCOMTR-MCNC: 162 MG/DL — HIGH (ref 70–99)
GLUCOSE BLDC GLUCOMTR-MCNC: 165 MG/DL — HIGH (ref 70–99)
GLUCOSE BLDC GLUCOMTR-MCNC: 172 MG/DL — HIGH (ref 70–99)
GLUCOSE BLDC GLUCOMTR-MCNC: 93 MG/DL — SIGNIFICANT CHANGE UP (ref 70–99)
GLUCOSE SERPL-MCNC: 101 MG/DL — HIGH (ref 70–99)
GLUCOSE SERPL-MCNC: 102 MG/DL — HIGH (ref 70–99)
GLUCOSE SERPL-MCNC: 103 MG/DL — HIGH (ref 70–99)
GLUCOSE SERPL-MCNC: 105 MG/DL — HIGH (ref 70–99)
GLUCOSE SERPL-MCNC: 107 MG/DL — HIGH (ref 70–99)
GLUCOSE SERPL-MCNC: 109 MG/DL — HIGH (ref 70–99)
GLUCOSE SERPL-MCNC: 109 MG/DL — HIGH (ref 70–99)
GLUCOSE SERPL-MCNC: 110 MG/DL — HIGH (ref 70–99)
GLUCOSE SERPL-MCNC: 110 MG/DL — HIGH (ref 70–99)
GLUCOSE SERPL-MCNC: 111 MG/DL — HIGH (ref 70–99)
GLUCOSE SERPL-MCNC: 111 MG/DL — HIGH (ref 70–99)
GLUCOSE SERPL-MCNC: 116 MG/DL — HIGH (ref 70–99)
GLUCOSE SERPL-MCNC: 117 MG/DL — HIGH (ref 70–99)
GLUCOSE SERPL-MCNC: 117 MG/DL — HIGH (ref 70–99)
GLUCOSE SERPL-MCNC: 118 MG/DL — HIGH (ref 70–99)
GLUCOSE SERPL-MCNC: 119 MG/DL — HIGH (ref 70–99)
GLUCOSE SERPL-MCNC: 120 MG/DL — HIGH (ref 70–99)
GLUCOSE SERPL-MCNC: 121 MG/DL — HIGH (ref 70–99)
GLUCOSE SERPL-MCNC: 122 MG/DL — HIGH (ref 70–99)
GLUCOSE SERPL-MCNC: 124 MG/DL — HIGH (ref 70–99)
GLUCOSE SERPL-MCNC: 126 MG/DL — HIGH (ref 70–99)
GLUCOSE SERPL-MCNC: 129 MG/DL — HIGH (ref 70–99)
GLUCOSE SERPL-MCNC: 131 MG/DL — HIGH (ref 70–99)
GLUCOSE SERPL-MCNC: 131 MG/DL — HIGH (ref 70–99)
GLUCOSE SERPL-MCNC: 132 MG/DL — HIGH (ref 70–99)
GLUCOSE SERPL-MCNC: 137 MG/DL — HIGH (ref 70–99)
GLUCOSE SERPL-MCNC: 143 MG/DL — HIGH (ref 70–99)
GLUCOSE SERPL-MCNC: 145 MG/DL — HIGH (ref 70–99)
GLUCOSE SERPL-MCNC: 149 MG/DL — HIGH (ref 70–99)
GLUCOSE SERPL-MCNC: 153 MG/DL — HIGH (ref 70–99)
GLUCOSE SERPL-MCNC: 155 MG/DL — HIGH (ref 70–99)
GLUCOSE SERPL-MCNC: 157 MG/DL — HIGH (ref 70–99)
GLUCOSE SERPL-MCNC: 159 MG/DL — HIGH (ref 70–99)
GLUCOSE SERPL-MCNC: 186 MG/DL — HIGH (ref 70–99)
GLUCOSE SERPL-MCNC: 84 MG/DL — SIGNIFICANT CHANGE UP (ref 70–99)
GLUCOSE SERPL-MCNC: 87 MG/DL — SIGNIFICANT CHANGE UP (ref 70–99)
GLUCOSE SERPL-MCNC: 93 MG/DL — SIGNIFICANT CHANGE UP (ref 70–99)
GLUCOSE SERPL-MCNC: 94 MG/DL — SIGNIFICANT CHANGE UP (ref 70–99)
GLUCOSE SERPL-MCNC: 97 MG/DL — SIGNIFICANT CHANGE UP (ref 70–99)
GLUCOSE SERPL-MCNC: 98 MG/DL — SIGNIFICANT CHANGE UP (ref 70–99)
GLUCOSE SERPL-MCNC: 98 MG/DL — SIGNIFICANT CHANGE UP (ref 70–99)
GRAM STN FLD: SIGNIFICANT CHANGE UP
HBV CORE AB SER-ACNC: SIGNIFICANT CHANGE UP
HBV CORE IGM SER-ACNC: SIGNIFICANT CHANGE UP
HBV SURFACE AB SER-ACNC: <3 MIU/ML — LOW
HBV SURFACE AB SER-ACNC: SIGNIFICANT CHANGE UP
HBV SURFACE AG SER-ACNC: SIGNIFICANT CHANGE UP
HCT VFR BLD CALC: 23.6 % — LOW (ref 39–50)
HCT VFR BLD CALC: 24.2 % — LOW (ref 39–50)
HCT VFR BLD CALC: 24.3 % — LOW (ref 39–50)
HCT VFR BLD CALC: 24.4 % — LOW (ref 39–50)
HCT VFR BLD CALC: 24.6 % — LOW (ref 39–50)
HCT VFR BLD CALC: 24.8 % — LOW (ref 39–50)
HCT VFR BLD CALC: 24.9 % — LOW (ref 39–50)
HCT VFR BLD CALC: 25.4 % — LOW (ref 39–50)
HCT VFR BLD CALC: 25.6 % — LOW (ref 39–50)
HCT VFR BLD CALC: 26.3 % — LOW (ref 39–50)
HCT VFR BLD CALC: 26.4 % — LOW (ref 39–50)
HCT VFR BLD CALC: 26.4 % — LOW (ref 39–50)
HCT VFR BLD CALC: 27.2 % — LOW (ref 39–50)
HCT VFR BLD CALC: 28 % — LOW (ref 39–50)
HCT VFR BLD CALC: 29.1 % — LOW (ref 39–50)
HCT VFR BLD CALC: 29.6 % — LOW (ref 39–50)
HCT VFR BLD CALC: 30.3 % — LOW (ref 39–50)
HCT VFR BLD CALC: 30.7 % — LOW (ref 39–50)
HCT VFR BLD CALC: 33.6 % — LOW (ref 39–50)
HCT VFR BLD CALC: 33.8 % — LOW (ref 39–50)
HCT VFR BLD CALC: 33.8 % — LOW (ref 39–50)
HCT VFR BLD CALC: 34.7 % — LOW (ref 39–50)
HCT VFR BLD CALC: 34.9 % — LOW (ref 39–50)
HCT VFR BLD CALC: 36.4 % — LOW (ref 39–50)
HCT VFR BLD CALC: 36.5 % — LOW (ref 39–50)
HCT VFR BLD CALC: 38.6 % — LOW (ref 39–50)
HCT VFR BLD CALC: 41.5 % — SIGNIFICANT CHANGE UP (ref 39–50)
HCT VFR BLD CALC: 42.8 % — SIGNIFICANT CHANGE UP (ref 39–50)
HCT VFR BLD CALC: 43.1 % — SIGNIFICANT CHANGE UP (ref 39–50)
HCT VFR BLD CALC: 44.1 % — SIGNIFICANT CHANGE UP (ref 39–50)
HCV AB S/CO SERPL IA: 12.87 S/CO — HIGH (ref 0–0.99)
HCV AB S/CO SERPL IA: 13.88 S/CO — HIGH (ref 0–0.99)
HCV AB SERPL-IMP: REACTIVE
HCV AB SERPL-IMP: REACTIVE
HCV RNA FLD QL NAA+PROBE: SIGNIFICANT CHANGE UP
HCV RNA FLD QL NAA+PROBE: SIGNIFICANT CHANGE UP
HGB BLD-MCNC: 10.5 G/DL — LOW (ref 13–17)
HGB BLD-MCNC: 10.7 G/DL — LOW (ref 13–17)
HGB BLD-MCNC: 10.8 G/DL — LOW (ref 13–17)
HGB BLD-MCNC: 11.5 G/DL — LOW (ref 13–17)
HGB BLD-MCNC: 11.7 G/DL — LOW (ref 13–17)
HGB BLD-MCNC: 11.9 G/DL — LOW (ref 13–17)
HGB BLD-MCNC: 11.9 G/DL — LOW (ref 13–17)
HGB BLD-MCNC: 12.2 G/DL — LOW (ref 13–17)
HGB BLD-MCNC: 12.7 G/DL — LOW (ref 13–17)
HGB BLD-MCNC: 12.8 G/DL — LOW (ref 13–17)
HGB BLD-MCNC: 13.1 G/DL — SIGNIFICANT CHANGE UP (ref 13–17)
HGB BLD-MCNC: 14.1 G/DL — SIGNIFICANT CHANGE UP (ref 13–17)
HGB BLD-MCNC: 15 G/DL — SIGNIFICANT CHANGE UP (ref 13–17)
HGB BLD-MCNC: 15.3 G/DL — SIGNIFICANT CHANGE UP (ref 13–17)
HGB BLD-MCNC: 15.5 G/DL — SIGNIFICANT CHANGE UP (ref 13–17)
HGB BLD-MCNC: 7.5 G/DL — LOW (ref 13–17)
HGB BLD-MCNC: 7.6 G/DL — LOW (ref 13–17)
HGB BLD-MCNC: 7.8 G/DL — LOW (ref 13–17)
HGB BLD-MCNC: 7.9 G/DL — LOW (ref 13–17)
HGB BLD-MCNC: 8.1 G/DL — LOW (ref 13–17)
HGB BLD-MCNC: 8.2 G/DL — LOW (ref 13–17)
HGB BLD-MCNC: 8.4 G/DL — LOW (ref 13–17)
HGB BLD-MCNC: 8.5 G/DL — LOW (ref 13–17)
HGB BLD-MCNC: 9 G/DL — LOW (ref 13–17)
HGB BLD-MCNC: 9.1 G/DL — LOW (ref 13–17)
HGB BLD-MCNC: 9.2 G/DL — LOW (ref 13–17)
HGB BLD-MCNC: 9.2 G/DL — LOW (ref 13–17)
HGB BLD-MCNC: 9.3 G/DL — LOW (ref 13–17)
HGB BLD-MCNC: 9.7 G/DL — LOW (ref 13–17)
HGB BLD-MCNC: 9.9 G/DL — LOW (ref 13–17)
HIV 1+2 AB+HIV1 P24 AG SERPL QL IA: SIGNIFICANT CHANGE UP
IANC: 16.38 K/UL — HIGH (ref 1.5–8.5)
IANC: 19.49 K/UL — HIGH (ref 1.5–8.5)
IANC: 22.84 K/UL — HIGH (ref 1.5–8.5)
IANC: 29.54 K/UL — HIGH (ref 1.5–8.5)
IMM GRANULOCYTES NFR BLD AUTO: 0.2 % — SIGNIFICANT CHANGE UP (ref 0–1.5)
IMM GRANULOCYTES NFR BLD AUTO: 0.3 % — SIGNIFICANT CHANGE UP (ref 0–1.5)
IMM GRANULOCYTES NFR BLD AUTO: 0.3 % — SIGNIFICANT CHANGE UP (ref 0–1.5)
IMM GRANULOCYTES NFR BLD AUTO: 1.7 % — HIGH (ref 0–1.5)
IMM GRANULOCYTES NFR BLD AUTO: 2.4 % — HIGH (ref 0–1.5)
IMM GRANULOCYTES NFR BLD AUTO: 3.1 % — HIGH (ref 0–1.5)
INR BLD: 1.04 RATIO — SIGNIFICANT CHANGE UP (ref 0.88–1.16)
INR BLD: 1.1 RATIO — SIGNIFICANT CHANGE UP (ref 0.88–1.16)
INR BLD: 1.1 RATIO — SIGNIFICANT CHANGE UP (ref 0.88–1.16)
INR BLD: 1.14 RATIO — SIGNIFICANT CHANGE UP (ref 0.88–1.16)
INR BLD: 1.14 RATIO — SIGNIFICANT CHANGE UP (ref 0.88–1.16)
INR BLD: 1.15 RATIO — SIGNIFICANT CHANGE UP (ref 0.88–1.16)
INR BLD: 1.17 RATIO — HIGH (ref 0.88–1.16)
INR BLD: 1.18 RATIO — HIGH (ref 0.88–1.16)
INR BLD: 1.2 RATIO — HIGH (ref 0.88–1.16)
INR BLD: 1.36 RATIO — HIGH (ref 0.88–1.16)
INR BLD: 1.36 RATIO — HIGH (ref 0.88–1.16)
INR BLD: 1.37 RATIO — HIGH (ref 0.88–1.16)
INR BLD: 1.38 RATIO — HIGH (ref 0.88–1.16)
INR BLD: 1.45 RATIO — HIGH (ref 0.88–1.16)
INR BLD: 1.48 RATIO — HIGH (ref 0.88–1.16)
INR BLD: 1.54 RATIO — HIGH (ref 0.88–1.16)
LACTATE BLDA-MCNC: 1.8 MMOL/L — SIGNIFICANT CHANGE UP (ref 0.5–2)
LACTATE SERPL-SCNC: 10.5 MMOL/L — CRITICAL HIGH (ref 0.5–2)
LACTATE SERPL-SCNC: 11.8 MMOL/L — CRITICAL HIGH (ref 0.5–2)
LACTATE SERPL-SCNC: 12 MMOL/L — CRITICAL HIGH (ref 0.5–2)
LACTATE SERPL-SCNC: 12.1 MMOL/L — CRITICAL HIGH (ref 0.5–2)
LACTATE SERPL-SCNC: 6.5 MMOL/L — CRITICAL HIGH (ref 0.5–2)
LACTATE SERPL-SCNC: 8.7 MMOL/L — CRITICAL HIGH (ref 0.5–2)
LG PLATELETS BLD QL AUTO: SLIGHT — SIGNIFICANT CHANGE UP
LIDOCAIN IGE QN: 45 U/L — SIGNIFICANT CHANGE UP (ref 22–51)
LYMPHOCYTES # BLD AUTO: 0.29 K/UL — LOW (ref 1–3.3)
LYMPHOCYTES # BLD AUTO: 0.43 K/UL — LOW (ref 1–3.3)
LYMPHOCYTES # BLD AUTO: 0.76 K/UL — LOW (ref 1–3.3)
LYMPHOCYTES # BLD AUTO: 0.9 % — LOW (ref 13–44)
LYMPHOCYTES # BLD AUTO: 0.96 K/UL — LOW (ref 1–3.3)
LYMPHOCYTES # BLD AUTO: 1.15 K/UL — SIGNIFICANT CHANGE UP (ref 1–3.3)
LYMPHOCYTES # BLD AUTO: 1.31 K/UL — SIGNIFICANT CHANGE UP (ref 1–3.3)
LYMPHOCYTES # BLD AUTO: 17.3 % — SIGNIFICANT CHANGE UP (ref 13–44)
LYMPHOCYTES # BLD AUTO: 19.1 % — SIGNIFICANT CHANGE UP (ref 13–44)
LYMPHOCYTES # BLD AUTO: 2 % — LOW (ref 13–44)
LYMPHOCYTES # BLD AUTO: 2.01 K/UL — SIGNIFICANT CHANGE UP (ref 1–3.3)
LYMPHOCYTES # BLD AUTO: 20.6 % — SIGNIFICANT CHANGE UP (ref 13–44)
LYMPHOCYTES # BLD AUTO: 3.8 % — LOW (ref 13–44)
LYMPHOCYTES # BLD AUTO: 4.1 % — LOW (ref 13–44)
MACROCYTES BLD QL: SLIGHT — SIGNIFICANT CHANGE UP
MAGNESIUM SERPL-MCNC: 1.6 MG/DL — SIGNIFICANT CHANGE UP (ref 1.6–2.6)
MAGNESIUM SERPL-MCNC: 1.7 MG/DL — SIGNIFICANT CHANGE UP (ref 1.6–2.6)
MAGNESIUM SERPL-MCNC: 1.8 MG/DL — SIGNIFICANT CHANGE UP (ref 1.6–2.6)
MAGNESIUM SERPL-MCNC: 1.9 MG/DL — SIGNIFICANT CHANGE UP (ref 1.6–2.6)
MAGNESIUM SERPL-MCNC: 2 MG/DL — SIGNIFICANT CHANGE UP (ref 1.6–2.6)
MAGNESIUM SERPL-MCNC: 2.1 MG/DL — SIGNIFICANT CHANGE UP (ref 1.6–2.6)
MAGNESIUM SERPL-MCNC: 2.2 MG/DL — SIGNIFICANT CHANGE UP (ref 1.6–2.6)
MAGNESIUM SERPL-MCNC: 2.3 MG/DL — SIGNIFICANT CHANGE UP (ref 1.6–2.6)
MAGNESIUM SERPL-MCNC: 2.3 MG/DL — SIGNIFICANT CHANGE UP (ref 1.6–2.6)
MAGNESIUM SERPL-MCNC: 2.7 MG/DL — HIGH (ref 1.6–2.6)
MANUAL SMEAR VERIFICATION: SIGNIFICANT CHANGE UP
MANUAL SMEAR VERIFICATION: SIGNIFICANT CHANGE UP
MCHC RBC-ENTMCNC: 29.2 PG — SIGNIFICANT CHANGE UP (ref 27–34)
MCHC RBC-ENTMCNC: 29.5 PG — SIGNIFICANT CHANGE UP (ref 27–34)
MCHC RBC-ENTMCNC: 29.5 PG — SIGNIFICANT CHANGE UP (ref 27–34)
MCHC RBC-ENTMCNC: 29.6 PG — SIGNIFICANT CHANGE UP (ref 27–34)
MCHC RBC-ENTMCNC: 29.6 PG — SIGNIFICANT CHANGE UP (ref 27–34)
MCHC RBC-ENTMCNC: 29.8 PG — SIGNIFICANT CHANGE UP (ref 27–34)
MCHC RBC-ENTMCNC: 29.9 PG — SIGNIFICANT CHANGE UP (ref 27–34)
MCHC RBC-ENTMCNC: 29.9 PG — SIGNIFICANT CHANGE UP (ref 27–34)
MCHC RBC-ENTMCNC: 30 PG — SIGNIFICANT CHANGE UP (ref 27–34)
MCHC RBC-ENTMCNC: 30.1 PG — SIGNIFICANT CHANGE UP (ref 27–34)
MCHC RBC-ENTMCNC: 30.1 PG — SIGNIFICANT CHANGE UP (ref 27–34)
MCHC RBC-ENTMCNC: 30.3 PG — SIGNIFICANT CHANGE UP (ref 27–34)
MCHC RBC-ENTMCNC: 30.3 PG — SIGNIFICANT CHANGE UP (ref 27–34)
MCHC RBC-ENTMCNC: 30.4 PG — SIGNIFICANT CHANGE UP (ref 27–34)
MCHC RBC-ENTMCNC: 30.4 PG — SIGNIFICANT CHANGE UP (ref 27–34)
MCHC RBC-ENTMCNC: 30.6 PG — SIGNIFICANT CHANGE UP (ref 27–34)
MCHC RBC-ENTMCNC: 30.7 PG — SIGNIFICANT CHANGE UP (ref 27–34)
MCHC RBC-ENTMCNC: 30.8 PG — SIGNIFICANT CHANGE UP (ref 27–34)
MCHC RBC-ENTMCNC: 30.9 PG — SIGNIFICANT CHANGE UP (ref 27–34)
MCHC RBC-ENTMCNC: 31.4 GM/DL — LOW (ref 32–36)
MCHC RBC-ENTMCNC: 31.4 PG — SIGNIFICANT CHANGE UP (ref 27–34)
MCHC RBC-ENTMCNC: 31.6 PG — SIGNIFICANT CHANGE UP (ref 27–34)
MCHC RBC-ENTMCNC: 31.6 PG — SIGNIFICANT CHANGE UP (ref 27–34)
MCHC RBC-ENTMCNC: 31.8 GM/DL — LOW (ref 32–36)
MCHC RBC-ENTMCNC: 32 GM/DL — SIGNIFICANT CHANGE UP (ref 32–36)
MCHC RBC-ENTMCNC: 32.1 PG — SIGNIFICANT CHANGE UP (ref 27–34)
MCHC RBC-ENTMCNC: 32.5 GM/DL — SIGNIFICANT CHANGE UP (ref 32–36)
MCHC RBC-ENTMCNC: 32.5 GM/DL — SIGNIFICANT CHANGE UP (ref 32–36)
MCHC RBC-ENTMCNC: 32.5 PG — SIGNIFICANT CHANGE UP (ref 27–34)
MCHC RBC-ENTMCNC: 32.5 PG — SIGNIFICANT CHANGE UP (ref 27–34)
MCHC RBC-ENTMCNC: 33.3 GM/DL — SIGNIFICANT CHANGE UP (ref 32–36)
MCHC RBC-ENTMCNC: 33.3 PG — SIGNIFICANT CHANGE UP (ref 27–34)
MCHC RBC-ENTMCNC: 33.5 GM/DL — SIGNIFICANT CHANGE UP (ref 32–36)
MCHC RBC-ENTMCNC: 33.9 GM/DL — SIGNIFICANT CHANGE UP (ref 32–36)
MCHC RBC-ENTMCNC: 33.9 GM/DL — SIGNIFICANT CHANGE UP (ref 32–36)
MCHC RBC-ENTMCNC: 34 GM/DL — SIGNIFICANT CHANGE UP (ref 32–36)
MCHC RBC-ENTMCNC: 34 GM/DL — SIGNIFICANT CHANGE UP (ref 32–36)
MCHC RBC-ENTMCNC: 34.1 GM/DL — SIGNIFICANT CHANGE UP (ref 32–36)
MCHC RBC-ENTMCNC: 34.2 GM/DL — SIGNIFICANT CHANGE UP (ref 32–36)
MCHC RBC-ENTMCNC: 34.2 GM/DL — SIGNIFICANT CHANGE UP (ref 32–36)
MCHC RBC-ENTMCNC: 34.3 GM/DL — SIGNIFICANT CHANGE UP (ref 32–36)
MCHC RBC-ENTMCNC: 34.6 GM/DL — SIGNIFICANT CHANGE UP (ref 32–36)
MCHC RBC-ENTMCNC: 34.6 GM/DL — SIGNIFICANT CHANGE UP (ref 32–36)
MCHC RBC-ENTMCNC: 34.8 GM/DL — SIGNIFICANT CHANGE UP (ref 32–36)
MCHC RBC-ENTMCNC: 34.8 GM/DL — SIGNIFICANT CHANGE UP (ref 32–36)
MCHC RBC-ENTMCNC: 34.9 GM/DL — SIGNIFICANT CHANGE UP (ref 32–36)
MCHC RBC-ENTMCNC: 34.9 GM/DL — SIGNIFICANT CHANGE UP (ref 32–36)
MCHC RBC-ENTMCNC: 35 GM/DL — SIGNIFICANT CHANGE UP (ref 32–36)
MCHC RBC-ENTMCNC: 35 GM/DL — SIGNIFICANT CHANGE UP (ref 32–36)
MCHC RBC-ENTMCNC: 35.1 GM/DL — SIGNIFICANT CHANGE UP (ref 32–36)
MCHC RBC-ENTMCNC: 35.1 GM/DL — SIGNIFICANT CHANGE UP (ref 32–36)
MCHC RBC-ENTMCNC: 35.2 GM/DL — SIGNIFICANT CHANGE UP (ref 32–36)
MCHC RBC-ENTMCNC: 35.5 GM/DL — SIGNIFICANT CHANGE UP (ref 32–36)
MCHC RBC-ENTMCNC: 35.5 GM/DL — SIGNIFICANT CHANGE UP (ref 32–36)
MCHC RBC-ENTMCNC: 35.6 GM/DL — SIGNIFICANT CHANGE UP (ref 32–36)
MCHC RBC-ENTMCNC: 35.7 GM/DL — SIGNIFICANT CHANGE UP (ref 32–36)
MCV RBC AUTO: 100 FL — SIGNIFICANT CHANGE UP (ref 80–100)
MCV RBC AUTO: 102.2 FL — HIGH (ref 80–100)
MCV RBC AUTO: 106.1 FL — HIGH (ref 80–100)
MCV RBC AUTO: 83.8 FL — SIGNIFICANT CHANGE UP (ref 80–100)
MCV RBC AUTO: 84.3 FL — SIGNIFICANT CHANGE UP (ref 80–100)
MCV RBC AUTO: 84.6 FL — SIGNIFICANT CHANGE UP (ref 80–100)
MCV RBC AUTO: 84.8 FL — SIGNIFICANT CHANGE UP (ref 80–100)
MCV RBC AUTO: 84.9 FL — SIGNIFICANT CHANGE UP (ref 80–100)
MCV RBC AUTO: 85 FL — SIGNIFICANT CHANGE UP (ref 80–100)
MCV RBC AUTO: 85.3 FL — SIGNIFICANT CHANGE UP (ref 80–100)
MCV RBC AUTO: 85.3 FL — SIGNIFICANT CHANGE UP (ref 80–100)
MCV RBC AUTO: 85.4 FL — SIGNIFICANT CHANGE UP (ref 80–100)
MCV RBC AUTO: 85.6 FL — SIGNIFICANT CHANGE UP (ref 80–100)
MCV RBC AUTO: 85.6 FL — SIGNIFICANT CHANGE UP (ref 80–100)
MCV RBC AUTO: 85.9 FL — SIGNIFICANT CHANGE UP (ref 80–100)
MCV RBC AUTO: 85.9 FL — SIGNIFICANT CHANGE UP (ref 80–100)
MCV RBC AUTO: 86.2 FL — SIGNIFICANT CHANGE UP (ref 80–100)
MCV RBC AUTO: 86.7 FL — SIGNIFICANT CHANGE UP (ref 80–100)
MCV RBC AUTO: 87.7 FL — SIGNIFICANT CHANGE UP (ref 80–100)
MCV RBC AUTO: 88.3 FL — SIGNIFICANT CHANGE UP (ref 80–100)
MCV RBC AUTO: 88.3 FL — SIGNIFICANT CHANGE UP (ref 80–100)
MCV RBC AUTO: 88.5 FL — SIGNIFICANT CHANGE UP (ref 80–100)
MCV RBC AUTO: 88.7 FL — SIGNIFICANT CHANGE UP (ref 80–100)
MCV RBC AUTO: 89.9 FL — SIGNIFICANT CHANGE UP (ref 80–100)
MCV RBC AUTO: 90.4 FL — SIGNIFICANT CHANGE UP (ref 80–100)
MCV RBC AUTO: 92.6 FL — SIGNIFICANT CHANGE UP (ref 80–100)
MCV RBC AUTO: 94.3 FL — SIGNIFICANT CHANGE UP (ref 80–100)
MCV RBC AUTO: 95.8 FL — SIGNIFICANT CHANGE UP (ref 80–100)
MCV RBC AUTO: 96.4 FL — SIGNIFICANT CHANGE UP (ref 80–100)
MCV RBC AUTO: 97.3 FL — SIGNIFICANT CHANGE UP (ref 80–100)
METHOD TYPE: SIGNIFICANT CHANGE UP
MICROCYTES BLD QL: SLIGHT — SIGNIFICANT CHANGE UP
MONOCYTES # BLD AUTO: 0 K/UL — SIGNIFICANT CHANGE UP (ref 0–0.9)
MONOCYTES # BLD AUTO: 0.42 K/UL — SIGNIFICANT CHANGE UP (ref 0–0.9)
MONOCYTES # BLD AUTO: 0.6 K/UL — SIGNIFICANT CHANGE UP (ref 0–0.9)
MONOCYTES # BLD AUTO: 0.68 K/UL — SIGNIFICANT CHANGE UP (ref 0–0.9)
MONOCYTES # BLD AUTO: 0.78 K/UL — SIGNIFICANT CHANGE UP (ref 0–0.9)
MONOCYTES # BLD AUTO: 0.79 K/UL — SIGNIFICANT CHANGE UP (ref 0–0.9)
MONOCYTES # BLD AUTO: 1.06 K/UL — HIGH (ref 0–0.9)
MONOCYTES NFR BLD AUTO: 0 % — LOW (ref 2–14)
MONOCYTES NFR BLD AUTO: 3.2 % — SIGNIFICANT CHANGE UP (ref 2–14)
MONOCYTES NFR BLD AUTO: 4.2 % — SIGNIFICANT CHANGE UP (ref 2–14)
MONOCYTES NFR BLD AUTO: 4.3 % — SIGNIFICANT CHANGE UP (ref 2–14)
MONOCYTES NFR BLD AUTO: 6.7 % — SIGNIFICANT CHANGE UP (ref 2–14)
MONOCYTES NFR BLD AUTO: 7.5 % — SIGNIFICANT CHANGE UP (ref 2–14)
MONOCYTES NFR BLD AUTO: 8.7 % — SIGNIFICANT CHANGE UP (ref 2–14)
MYELOCYTES NFR BLD: 0.9 % — HIGH (ref 0–0)
NEUTROPHILS # BLD AUTO: 16.38 K/UL — HIGH (ref 1.8–7.4)
NEUTROPHILS # BLD AUTO: 19.49 K/UL — HIGH (ref 1.8–7.4)
NEUTROPHILS # BLD AUTO: 22.84 K/UL — HIGH (ref 1.8–7.4)
NEUTROPHILS # BLD AUTO: 3.65 K/UL — SIGNIFICANT CHANGE UP (ref 1.8–7.4)
NEUTROPHILS # BLD AUTO: 30.26 K/UL — HIGH (ref 1.8–7.4)
NEUTROPHILS # BLD AUTO: 4.68 K/UL — SIGNIFICANT CHANGE UP (ref 1.8–7.4)
NEUTROPHILS # BLD AUTO: 8.68 K/UL — HIGH (ref 1.8–7.4)
NEUTROPHILS NFR BLD AUTO: 65.6 % — SIGNIFICANT CHANGE UP (ref 43–77)
NEUTROPHILS NFR BLD AUTO: 68.1 % — SIGNIFICANT CHANGE UP (ref 43–77)
NEUTROPHILS NFR BLD AUTO: 74.9 % — SIGNIFICANT CHANGE UP (ref 43–77)
NEUTROPHILS NFR BLD AUTO: 88.2 % — HIGH (ref 43–77)
NEUTROPHILS NFR BLD AUTO: 90.2 % — HIGH (ref 43–77)
NEUTROPHILS NFR BLD AUTO: 92.3 % — HIGH (ref 43–77)
NEUTROPHILS NFR BLD AUTO: 94.7 % — HIGH (ref 43–77)
NON-GYNECOLOGICAL CYTOLOGY STUDY: SIGNIFICANT CHANGE UP
NON-GYNECOLOGICAL CYTOLOGY STUDY: SIGNIFICANT CHANGE UP
NRBC # BLD: 0 /100 WBCS — SIGNIFICANT CHANGE UP
NRBC # BLD: 0 /100 WBCS — SIGNIFICANT CHANGE UP (ref 0–0)
NRBC # BLD: 20 /100 WBCS — SIGNIFICANT CHANGE UP
NRBC # BLD: 24 /100 WBCS — SIGNIFICANT CHANGE UP
NRBC # BLD: 6 /100 WBCS — SIGNIFICANT CHANGE UP
NRBC # FLD: 0 K/UL — SIGNIFICANT CHANGE UP
NRBC # FLD: 0.04 K/UL — HIGH
NRBC # FLD: 0.12 K/UL — HIGH
NRBC # FLD: 0.26 K/UL — HIGH
NRBC # FLD: 1.4 K/UL — HIGH
NRBC # FLD: 4.13 K/UL — HIGH
NRBC # FLD: 4.42 K/UL — HIGH
ORGANISM # SPEC MICROSCOPIC CNT: SIGNIFICANT CHANGE UP
PHOSPHATE SERPL-MCNC: 0.8 MG/DL — CRITICAL LOW (ref 2.5–4.5)
PHOSPHATE SERPL-MCNC: 1 MG/DL — CRITICAL LOW (ref 2.5–4.5)
PHOSPHATE SERPL-MCNC: 1.1 MG/DL — LOW (ref 2.5–4.5)
PHOSPHATE SERPL-MCNC: 1.2 MG/DL — LOW (ref 2.5–4.5)
PHOSPHATE SERPL-MCNC: 1.2 MG/DL — LOW (ref 2.5–4.5)
PHOSPHATE SERPL-MCNC: 1.3 MG/DL — LOW (ref 2.5–4.5)
PHOSPHATE SERPL-MCNC: 1.4 MG/DL — LOW (ref 2.5–4.5)
PHOSPHATE SERPL-MCNC: 1.5 MG/DL — LOW (ref 2.5–4.5)
PHOSPHATE SERPL-MCNC: 1.6 MG/DL — LOW (ref 2.5–4.5)
PHOSPHATE SERPL-MCNC: 1.7 MG/DL — LOW (ref 2.5–4.5)
PHOSPHATE SERPL-MCNC: 1.8 MG/DL — LOW (ref 2.5–4.5)
PHOSPHATE SERPL-MCNC: 1.8 MG/DL — LOW (ref 2.5–4.5)
PHOSPHATE SERPL-MCNC: 1.9 MG/DL — LOW (ref 2.5–4.5)
PHOSPHATE SERPL-MCNC: 2.1 MG/DL — LOW (ref 2.5–4.5)
PHOSPHATE SERPL-MCNC: 2.2 MG/DL — LOW (ref 2.5–4.5)
PHOSPHATE SERPL-MCNC: 2.3 MG/DL — LOW (ref 2.5–4.5)
PHOSPHATE SERPL-MCNC: 2.4 MG/DL — LOW (ref 2.5–4.5)
PHOSPHATE SERPL-MCNC: 2.4 MG/DL — LOW (ref 2.5–4.5)
PHOSPHATE SERPL-MCNC: 2.5 MG/DL — SIGNIFICANT CHANGE UP (ref 2.5–4.5)
PHOSPHATE SERPL-MCNC: 2.6 MG/DL — SIGNIFICANT CHANGE UP (ref 2.5–4.5)
PHOSPHATE SERPL-MCNC: 2.7 MG/DL — SIGNIFICANT CHANGE UP (ref 2.5–4.5)
PHOSPHATE SERPL-MCNC: 2.8 MG/DL — SIGNIFICANT CHANGE UP (ref 2.5–4.5)
PHOSPHATE SERPL-MCNC: 2.8 MG/DL — SIGNIFICANT CHANGE UP (ref 2.5–4.5)
PHOSPHATE SERPL-MCNC: 2.9 MG/DL — SIGNIFICANT CHANGE UP (ref 2.5–4.5)
PHOSPHATE SERPL-MCNC: 3 MG/DL — SIGNIFICANT CHANGE UP (ref 2.5–4.5)
PHOSPHATE SERPL-MCNC: 3.1 MG/DL — SIGNIFICANT CHANGE UP (ref 2.5–4.5)
PHOSPHATE SERPL-MCNC: 3.7 MG/DL — SIGNIFICANT CHANGE UP (ref 2.5–4.5)
PHOSPHATE SERPL-MCNC: 3.7 MG/DL — SIGNIFICANT CHANGE UP (ref 2.5–4.5)
PHOSPHATE SERPL-MCNC: 4 MG/DL — SIGNIFICANT CHANGE UP (ref 2.5–4.5)
PLAT MORPH BLD: ABNORMAL
PLAT MORPH BLD: NORMAL — SIGNIFICANT CHANGE UP
PLAT MORPH BLD: NORMAL — SIGNIFICANT CHANGE UP
PLATELET # BLD AUTO: 117 K/UL — LOW (ref 150–400)
PLATELET # BLD AUTO: 163 K/UL — SIGNIFICANT CHANGE UP (ref 150–400)
PLATELET # BLD AUTO: 206 K/UL — SIGNIFICANT CHANGE UP (ref 150–400)
PLATELET # BLD AUTO: 211 K/UL — SIGNIFICANT CHANGE UP (ref 150–400)
PLATELET # BLD AUTO: 224 K/UL — SIGNIFICANT CHANGE UP (ref 150–400)
PLATELET # BLD AUTO: 243 K/UL — SIGNIFICANT CHANGE UP (ref 150–400)
PLATELET # BLD AUTO: 243 K/UL — SIGNIFICANT CHANGE UP (ref 150–400)
PLATELET # BLD AUTO: 250 K/UL — SIGNIFICANT CHANGE UP (ref 150–400)
PLATELET # BLD AUTO: 256 K/UL — SIGNIFICANT CHANGE UP (ref 150–400)
PLATELET # BLD AUTO: 258 K/UL — SIGNIFICANT CHANGE UP (ref 150–400)
PLATELET # BLD AUTO: 275 K/UL — SIGNIFICANT CHANGE UP (ref 150–400)
PLATELET # BLD AUTO: 276 K/UL — SIGNIFICANT CHANGE UP (ref 150–400)
PLATELET # BLD AUTO: 278 K/UL — SIGNIFICANT CHANGE UP (ref 150–400)
PLATELET # BLD AUTO: 301 K/UL — SIGNIFICANT CHANGE UP (ref 150–400)
PLATELET # BLD AUTO: 306 K/UL — SIGNIFICANT CHANGE UP (ref 150–400)
PLATELET # BLD AUTO: 325 K/UL — SIGNIFICANT CHANGE UP (ref 150–400)
PLATELET # BLD AUTO: 333 K/UL — SIGNIFICANT CHANGE UP (ref 150–400)
PLATELET # BLD AUTO: 335 K/UL — SIGNIFICANT CHANGE UP (ref 150–400)
PLATELET # BLD AUTO: 362 K/UL — SIGNIFICANT CHANGE UP (ref 150–400)
PLATELET # BLD AUTO: 471 K/UL — HIGH (ref 150–400)
PLATELET # BLD AUTO: 562 K/UL — HIGH (ref 150–400)
PLATELET # BLD AUTO: 57 K/UL — LOW (ref 150–400)
PLATELET # BLD AUTO: 571 K/UL — HIGH (ref 150–400)
PLATELET # BLD AUTO: 584 K/UL — HIGH (ref 150–400)
PLATELET # BLD AUTO: 592 K/UL — HIGH (ref 150–400)
PLATELET # BLD AUTO: 596 K/UL — HIGH (ref 150–400)
PLATELET # BLD AUTO: 600 K/UL — HIGH (ref 150–400)
PLATELET # BLD AUTO: 608 K/UL — HIGH (ref 150–400)
PLATELET # BLD AUTO: 85 K/UL — LOW (ref 150–400)
PLATELET # BLD AUTO: 98 K/UL — LOW (ref 150–400)
PLATELET COUNT - ESTIMATE: ABNORMAL
POLYCHROMASIA BLD QL SMEAR: SLIGHT — SIGNIFICANT CHANGE UP
POLYCHROMASIA BLD QL SMEAR: SLIGHT — SIGNIFICANT CHANGE UP
POTASSIUM SERPL-MCNC: 3 MMOL/L — LOW (ref 3.5–5.3)
POTASSIUM SERPL-MCNC: 3 MMOL/L — LOW (ref 3.5–5.3)
POTASSIUM SERPL-MCNC: 3.1 MMOL/L — LOW (ref 3.5–5.3)
POTASSIUM SERPL-MCNC: 3.1 MMOL/L — LOW (ref 3.5–5.3)
POTASSIUM SERPL-MCNC: 3.2 MMOL/L — LOW (ref 3.5–5.3)
POTASSIUM SERPL-MCNC: 3.3 MMOL/L — LOW (ref 3.5–5.3)
POTASSIUM SERPL-MCNC: 3.4 MMOL/L — LOW (ref 3.5–5.3)
POTASSIUM SERPL-MCNC: 3.5 MMOL/L — SIGNIFICANT CHANGE UP (ref 3.5–5.3)
POTASSIUM SERPL-MCNC: 3.6 MMOL/L — SIGNIFICANT CHANGE UP (ref 3.5–5.3)
POTASSIUM SERPL-MCNC: 3.7 MMOL/L — SIGNIFICANT CHANGE UP (ref 3.5–5.3)
POTASSIUM SERPL-MCNC: 3.8 MMOL/L — SIGNIFICANT CHANGE UP (ref 3.5–5.3)
POTASSIUM SERPL-MCNC: 3.9 MMOL/L — SIGNIFICANT CHANGE UP (ref 3.5–5.3)
POTASSIUM SERPL-MCNC: 4 MMOL/L — SIGNIFICANT CHANGE UP (ref 3.5–5.3)
POTASSIUM SERPL-MCNC: 4 MMOL/L — SIGNIFICANT CHANGE UP (ref 3.5–5.3)
POTASSIUM SERPL-MCNC: 4.1 MMOL/L — SIGNIFICANT CHANGE UP (ref 3.5–5.3)
POTASSIUM SERPL-MCNC: 4.1 MMOL/L — SIGNIFICANT CHANGE UP (ref 3.5–5.3)
POTASSIUM SERPL-SCNC: 3 MMOL/L — LOW (ref 3.5–5.3)
POTASSIUM SERPL-SCNC: 3 MMOL/L — LOW (ref 3.5–5.3)
POTASSIUM SERPL-SCNC: 3.1 MMOL/L — LOW (ref 3.5–5.3)
POTASSIUM SERPL-SCNC: 3.1 MMOL/L — LOW (ref 3.5–5.3)
POTASSIUM SERPL-SCNC: 3.2 MMOL/L — LOW (ref 3.5–5.3)
POTASSIUM SERPL-SCNC: 3.3 MMOL/L — LOW (ref 3.5–5.3)
POTASSIUM SERPL-SCNC: 3.4 MMOL/L — LOW (ref 3.5–5.3)
POTASSIUM SERPL-SCNC: 3.5 MMOL/L — SIGNIFICANT CHANGE UP (ref 3.5–5.3)
POTASSIUM SERPL-SCNC: 3.6 MMOL/L — SIGNIFICANT CHANGE UP (ref 3.5–5.3)
POTASSIUM SERPL-SCNC: 3.7 MMOL/L — SIGNIFICANT CHANGE UP (ref 3.5–5.3)
POTASSIUM SERPL-SCNC: 3.8 MMOL/L — SIGNIFICANT CHANGE UP (ref 3.5–5.3)
POTASSIUM SERPL-SCNC: 3.9 MMOL/L — SIGNIFICANT CHANGE UP (ref 3.5–5.3)
POTASSIUM SERPL-SCNC: 4 MMOL/L — SIGNIFICANT CHANGE UP (ref 3.5–5.3)
POTASSIUM SERPL-SCNC: 4 MMOL/L — SIGNIFICANT CHANGE UP (ref 3.5–5.3)
POTASSIUM SERPL-SCNC: 4.1 MMOL/L — SIGNIFICANT CHANGE UP (ref 3.5–5.3)
POTASSIUM SERPL-SCNC: 4.1 MMOL/L — SIGNIFICANT CHANGE UP (ref 3.5–5.3)
PROT SERPL-MCNC: 4.7 G/DL — LOW (ref 6–8.3)
PROT SERPL-MCNC: 4.8 G/DL — LOW (ref 6–8.3)
PROT SERPL-MCNC: 4.9 G/DL — LOW (ref 6–8.3)
PROT SERPL-MCNC: 5 G/DL — LOW (ref 6–8.3)
PROT SERPL-MCNC: 5.1 G/DL — LOW (ref 6–8.3)
PROT SERPL-MCNC: 5.1 G/DL — LOW (ref 6–8.3)
PROT SERPL-MCNC: 5.4 G/DL — LOW (ref 6–8.3)
PROT SERPL-MCNC: 5.4 G/DL — LOW (ref 6–8.3)
PROT SERPL-MCNC: 5.5 G/DL — LOW (ref 6–8.3)
PROT SERPL-MCNC: 5.5 G/DL — LOW (ref 6–8.3)
PROT SERPL-MCNC: 5.6 G/DL — LOW (ref 6–8.3)
PROT SERPL-MCNC: 5.6 G/DL — LOW (ref 6–8.3)
PROT SERPL-MCNC: 5.7 G/DL — LOW (ref 6–8.3)
PROT SERPL-MCNC: 5.8 G/DL — LOW (ref 6–8.3)
PROT SERPL-MCNC: 6.1 G/DL — SIGNIFICANT CHANGE UP (ref 6–8.3)
PROT SERPL-MCNC: 6.1 G/DL — SIGNIFICANT CHANGE UP (ref 6–8.3)
PROT SERPL-MCNC: 6.2 G/DL — SIGNIFICANT CHANGE UP (ref 6–8.3)
PROT SERPL-MCNC: 6.2 G/DL — SIGNIFICANT CHANGE UP (ref 6–8.3)
PROT SERPL-MCNC: 6.3 G/DL — SIGNIFICANT CHANGE UP (ref 6–8.3)
PROT SERPL-MCNC: 6.3 G/DL — SIGNIFICANT CHANGE UP (ref 6–8.3)
PROT SERPL-MCNC: 6.4 G/DL — SIGNIFICANT CHANGE UP (ref 6–8.3)
PROT SERPL-MCNC: 6.4 G/DL — SIGNIFICANT CHANGE UP (ref 6–8.3)
PROT SERPL-MCNC: 6.5 G/DL — SIGNIFICANT CHANGE UP (ref 6–8.3)
PROT SERPL-MCNC: 6.5 G/DL — SIGNIFICANT CHANGE UP (ref 6–8.3)
PROT SERPL-MCNC: 6.6 G/DL — SIGNIFICANT CHANGE UP (ref 6–8.3)
PROT SERPL-MCNC: 6.7 G/DL — SIGNIFICANT CHANGE UP (ref 6–8.3)
PROT SERPL-MCNC: 6.9 G/DL — SIGNIFICANT CHANGE UP (ref 6–8.3)
PROT SERPL-MCNC: 7.1 G/DL — SIGNIFICANT CHANGE UP (ref 6.6–8.7)
PROT SERPL-MCNC: 7.8 G/DL — SIGNIFICANT CHANGE UP (ref 6.6–8.7)
PROT SERPL-MCNC: 8 G/DL — SIGNIFICANT CHANGE UP (ref 6.6–8.7)
PROT SERPL-MCNC: 8.3 G/DL — SIGNIFICANT CHANGE UP (ref 6–8.3)
PROTHROM AB SERPL-ACNC: 11.9 SEC — SIGNIFICANT CHANGE UP (ref 10.6–13.6)
PROTHROM AB SERPL-ACNC: 12.5 SEC — SIGNIFICANT CHANGE UP (ref 10.6–13.6)
PROTHROM AB SERPL-ACNC: 12.7 SEC — SIGNIFICANT CHANGE UP (ref 10.6–13.6)
PROTHROM AB SERPL-ACNC: 13 SEC — SIGNIFICANT CHANGE UP (ref 10.6–13.6)
PROTHROM AB SERPL-ACNC: 13.1 SEC — SIGNIFICANT CHANGE UP (ref 10.6–13.6)
PROTHROM AB SERPL-ACNC: 13.4 SEC — SIGNIFICANT CHANGE UP (ref 10.6–13.6)
PROTHROM AB SERPL-ACNC: 13.5 SEC — SIGNIFICANT CHANGE UP (ref 10.6–13.6)
PROTHROM AB SERPL-ACNC: 13.5 SEC — SIGNIFICANT CHANGE UP (ref 10.6–13.6)
PROTHROM AB SERPL-ACNC: 13.6 SEC — SIGNIFICANT CHANGE UP (ref 10.6–13.6)
PROTHROM AB SERPL-ACNC: 15.3 SEC — HIGH (ref 10.6–13.6)
PROTHROM AB SERPL-ACNC: 15.3 SEC — HIGH (ref 10.6–13.6)
PROTHROM AB SERPL-ACNC: 15.5 SEC — HIGH (ref 10.6–13.6)
PROTHROM AB SERPL-ACNC: 15.5 SEC — HIGH (ref 10.6–13.6)
PROTHROM AB SERPL-ACNC: 16.2 SEC — HIGH (ref 10.6–13.6)
PROTHROM AB SERPL-ACNC: 16.6 SEC — HIGH (ref 10.6–13.6)
PROTHROM AB SERPL-ACNC: 17.3 SEC — HIGH (ref 10.6–13.6)
PTH-INTACT FLD-MCNC: 10 PG/ML — LOW (ref 15–65)
PTH-INTACT FLD-MCNC: 9 PG/ML — LOW (ref 15–65)
RBC # BLD: 2.28 M/UL — LOW (ref 4.2–5.8)
RBC # BLD: 2.31 M/UL — LOW (ref 4.2–5.8)
RBC # BLD: 2.43 M/UL — LOW (ref 4.2–5.8)
RBC # BLD: 2.53 M/UL — LOW (ref 4.2–5.8)
RBC # BLD: 2.56 M/UL — LOW (ref 4.2–5.8)
RBC # BLD: 2.61 M/UL — LOW (ref 4.2–5.8)
RBC # BLD: 2.65 M/UL — LOW (ref 4.2–5.8)
RBC # BLD: 2.76 M/UL — LOW (ref 4.2–5.8)
RBC # BLD: 2.85 M/UL — LOW (ref 4.2–5.8)
RBC # BLD: 2.98 M/UL — LOW (ref 4.2–5.8)
RBC # BLD: 2.99 M/UL — LOW (ref 4.2–5.8)
RBC # BLD: 3.01 M/UL — LOW (ref 4.2–5.8)
RBC # BLD: 3.19 M/UL — LOW (ref 4.2–5.8)
RBC # BLD: 3.22 M/UL — LOW (ref 4.2–5.8)
RBC # BLD: 3.26 M/UL — LOW (ref 4.2–5.8)
RBC # BLD: 3.5 M/UL — LOW (ref 4.2–5.8)
RBC # BLD: 3.57 M/UL — LOW (ref 4.2–5.8)
RBC # BLD: 3.61 M/UL — LOW (ref 4.2–5.8)
RBC # BLD: 3.79 M/UL — LOW (ref 4.2–5.8)
RBC # BLD: 3.9 M/UL — LOW (ref 4.2–5.8)
RBC # BLD: 3.96 M/UL — LOW (ref 4.2–5.8)
RBC # BLD: 4.04 M/UL — LOW (ref 4.2–5.8)
RBC # BLD: 4.05 M/UL — LOW (ref 4.2–5.8)
RBC # BLD: 4.25 M/UL — SIGNIFICANT CHANGE UP (ref 4.2–5.8)
RBC # BLD: 4.33 M/UL — SIGNIFICANT CHANGE UP (ref 4.2–5.8)
RBC # BLD: 4.36 M/UL — SIGNIFICANT CHANGE UP (ref 4.2–5.8)
RBC # BLD: 4.7 M/UL — SIGNIFICANT CHANGE UP (ref 4.2–5.8)
RBC # BLD: 5.08 M/UL — SIGNIFICANT CHANGE UP (ref 4.2–5.8)
RBC # BLD: 5.11 M/UL — SIGNIFICANT CHANGE UP (ref 4.2–5.8)
RBC # BLD: 5.17 M/UL — SIGNIFICANT CHANGE UP (ref 4.2–5.8)
RBC # FLD: 13 % — SIGNIFICANT CHANGE UP (ref 10.3–14.5)
RBC # FLD: 13.1 % — SIGNIFICANT CHANGE UP (ref 10.3–14.5)
RBC # FLD: 13.2 % — SIGNIFICANT CHANGE UP (ref 10.3–14.5)
RBC # FLD: 13.3 % — SIGNIFICANT CHANGE UP (ref 10.3–14.5)
RBC # FLD: 13.4 % — SIGNIFICANT CHANGE UP (ref 10.3–14.5)
RBC # FLD: 13.5 % — SIGNIFICANT CHANGE UP (ref 10.3–14.5)
RBC # FLD: 13.5 % — SIGNIFICANT CHANGE UP (ref 10.3–14.5)
RBC # FLD: 13.6 % — SIGNIFICANT CHANGE UP (ref 10.3–14.5)
RBC # FLD: 13.7 % — SIGNIFICANT CHANGE UP (ref 10.3–14.5)
RBC # FLD: 13.8 % — SIGNIFICANT CHANGE UP (ref 10.3–14.5)
RBC # FLD: 14.2 % — SIGNIFICANT CHANGE UP (ref 10.3–14.5)
RBC # FLD: 14.2 % — SIGNIFICANT CHANGE UP (ref 10.3–14.5)
RBC # FLD: 14.4 % — SIGNIFICANT CHANGE UP (ref 10.3–14.5)
RBC # FLD: 14.8 % — HIGH (ref 10.3–14.5)
RBC # FLD: 14.9 % — HIGH (ref 10.3–14.5)
RBC # FLD: 15 % — HIGH (ref 10.3–14.5)
RBC # FLD: 15.1 % — HIGH (ref 10.3–14.5)
RBC # FLD: 15.2 % — HIGH (ref 10.3–14.5)
RBC # FLD: 16.3 % — HIGH (ref 10.3–14.5)
RBC # FLD: 18 % — HIGH (ref 10.3–14.5)
RBC # FLD: 19 % — HIGH (ref 10.3–14.5)
RBC # FLD: 20.5 % — HIGH (ref 10.3–14.5)
RBC # FLD: 21.2 % — HIGH (ref 10.3–14.5)
RBC # FLD: 21.8 % — HIGH (ref 10.3–14.5)
RBC # FLD: 22.3 % — HIGH (ref 10.3–14.5)
RBC # FLD: 24.3 % — HIGH (ref 10.3–14.5)
RBC # FLD: 26.2 % — HIGH (ref 10.3–14.5)
RBC # FLD: 27.8 % — HIGH (ref 10.3–14.5)
RBC BLD AUTO: ABNORMAL
RBC BLD AUTO: NORMAL — SIGNIFICANT CHANGE UP
RBC BLD AUTO: SIGNIFICANT CHANGE UP
RH IG SCN BLD-IMP: NEGATIVE — SIGNIFICANT CHANGE UP
SARS-COV-2 IGG+IGM SERPL QL IA: 0.4 U/ML — SIGNIFICANT CHANGE UP
SARS-COV-2 IGG+IGM SERPL QL IA: 28.3 U/ML — HIGH
SARS-COV-2 IGG+IGM SERPL QL IA: NEGATIVE — SIGNIFICANT CHANGE UP
SARS-COV-2 IGG+IGM SERPL QL IA: POSITIVE
SARS-COV-2 N GENE NPH QL NAA+PROBE: NOT DETECTED
SARS-COV-2 RNA SPEC QL NAA+PROBE: SIGNIFICANT CHANGE UP
SODIUM SERPL-SCNC: 130 MMOL/L — LOW (ref 135–145)
SODIUM SERPL-SCNC: 131 MMOL/L — LOW (ref 135–145)
SODIUM SERPL-SCNC: 131 MMOL/L — LOW (ref 135–145)
SODIUM SERPL-SCNC: 132 MMOL/L — LOW (ref 135–145)
SODIUM SERPL-SCNC: 132 MMOL/L — LOW (ref 135–145)
SODIUM SERPL-SCNC: 133 MMOL/L — LOW (ref 135–145)
SODIUM SERPL-SCNC: 134 MMOL/L — LOW (ref 135–145)
SODIUM SERPL-SCNC: 135 MMOL/L — SIGNIFICANT CHANGE UP (ref 135–145)
SODIUM SERPL-SCNC: 136 MMOL/L — SIGNIFICANT CHANGE UP (ref 135–145)
SODIUM SERPL-SCNC: 137 MMOL/L — SIGNIFICANT CHANGE UP (ref 135–145)
SODIUM SERPL-SCNC: 137 MMOL/L — SIGNIFICANT CHANGE UP (ref 135–145)
SODIUM SERPL-SCNC: 138 MMOL/L — SIGNIFICANT CHANGE UP (ref 135–145)
SODIUM SERPL-SCNC: 138 MMOL/L — SIGNIFICANT CHANGE UP (ref 135–145)
SODIUM SERPL-SCNC: 139 MMOL/L — SIGNIFICANT CHANGE UP (ref 135–145)
SODIUM SERPL-SCNC: 140 MMOL/L — SIGNIFICANT CHANGE UP (ref 135–145)
SODIUM SERPL-SCNC: 141 MMOL/L — SIGNIFICANT CHANGE UP (ref 135–145)
SODIUM SERPL-SCNC: 141 MMOL/L — SIGNIFICANT CHANGE UP (ref 135–145)
SODIUM SERPL-SCNC: 143 MMOL/L — SIGNIFICANT CHANGE UP (ref 135–145)
SODIUM SERPL-SCNC: 144 MMOL/L — SIGNIFICANT CHANGE UP (ref 135–145)
SODIUM SERPL-SCNC: 145 MMOL/L — SIGNIFICANT CHANGE UP (ref 135–145)
SODIUM SERPL-SCNC: 145 MMOL/L — SIGNIFICANT CHANGE UP (ref 135–145)
SODIUM SERPL-SCNC: 146 MMOL/L — HIGH (ref 135–145)
SODIUM SERPL-SCNC: 147 MMOL/L — HIGH (ref 135–145)
SPECIMEN SOURCE: SIGNIFICANT CHANGE UP
SURGICAL PATHOLOGY STUDY: SIGNIFICANT CHANGE UP
SURGICAL PATHOLOGY STUDY: SIGNIFICANT CHANGE UP
TRIGL SERPL-MCNC: 117 MG/DL — SIGNIFICANT CHANGE UP
VARIANT LYMPHS # BLD: 2.6 % — SIGNIFICANT CHANGE UP (ref 0–6)
VIT D25+D1,25 OH+D1,25 PNL SERPL-MCNC: 40.4 PG/ML — SIGNIFICANT CHANGE UP (ref 19.9–79.3)
WBC # BLD: 10.68 K/UL — HIGH (ref 3.8–10.5)
WBC # BLD: 11.16 K/UL — HIGH (ref 3.8–10.5)
WBC # BLD: 11.61 K/UL — HIGH (ref 3.8–10.5)
WBC # BLD: 11.71 K/UL — HIGH (ref 3.8–10.5)
WBC # BLD: 13.31 K/UL — HIGH (ref 3.8–10.5)
WBC # BLD: 16.57 K/UL — HIGH (ref 3.8–10.5)
WBC # BLD: 18.55 K/UL — HIGH (ref 3.8–10.5)
WBC # BLD: 19.21 K/UL — HIGH (ref 3.8–10.5)
WBC # BLD: 21.15 K/UL — HIGH (ref 3.8–10.5)
WBC # BLD: 21.68 K/UL — HIGH (ref 3.8–10.5)
WBC # BLD: 25.31 K/UL — HIGH (ref 3.8–10.5)
WBC # BLD: 26.5 K/UL — HIGH (ref 3.8–10.5)
WBC # BLD: 30.24 K/UL — HIGH (ref 3.8–10.5)
WBC # BLD: 31.95 K/UL — HIGH (ref 3.8–10.5)
WBC # BLD: 5.57 K/UL — SIGNIFICANT CHANGE UP (ref 3.8–10.5)
WBC # BLD: 5.79 K/UL — SIGNIFICANT CHANGE UP (ref 3.8–10.5)
WBC # BLD: 6.57 K/UL — SIGNIFICANT CHANGE UP (ref 3.8–10.5)
WBC # BLD: 6.64 K/UL — SIGNIFICANT CHANGE UP (ref 3.8–10.5)
WBC # BLD: 6.87 K/UL — SIGNIFICANT CHANGE UP (ref 3.8–10.5)
WBC # BLD: 6.94 K/UL — SIGNIFICANT CHANGE UP (ref 3.8–10.5)
WBC # BLD: 7.36 K/UL — SIGNIFICANT CHANGE UP (ref 3.8–10.5)
WBC # BLD: 7.47 K/UL — SIGNIFICANT CHANGE UP (ref 3.8–10.5)
WBC # BLD: 7.63 K/UL — SIGNIFICANT CHANGE UP (ref 3.8–10.5)
WBC # BLD: 7.68 K/UL — SIGNIFICANT CHANGE UP (ref 3.8–10.5)
WBC # BLD: 8.26 K/UL — SIGNIFICANT CHANGE UP (ref 3.8–10.5)
WBC # BLD: 8.47 K/UL — SIGNIFICANT CHANGE UP (ref 3.8–10.5)
WBC # BLD: 8.64 K/UL — SIGNIFICANT CHANGE UP (ref 3.8–10.5)
WBC # BLD: 8.99 K/UL — SIGNIFICANT CHANGE UP (ref 3.8–10.5)
WBC # BLD: 9.09 K/UL — SIGNIFICANT CHANGE UP (ref 3.8–10.5)
WBC # BLD: 9.24 K/UL — SIGNIFICANT CHANGE UP (ref 3.8–10.5)
WBC # FLD AUTO: 10.68 K/UL — HIGH (ref 3.8–10.5)
WBC # FLD AUTO: 11.16 K/UL — HIGH (ref 3.8–10.5)
WBC # FLD AUTO: 11.61 K/UL — HIGH (ref 3.8–10.5)
WBC # FLD AUTO: 11.71 K/UL — HIGH (ref 3.8–10.5)
WBC # FLD AUTO: 13.31 K/UL — HIGH (ref 3.8–10.5)
WBC # FLD AUTO: 16.57 K/UL — HIGH (ref 3.8–10.5)
WBC # FLD AUTO: 18.55 K/UL — HIGH (ref 3.8–10.5)
WBC # FLD AUTO: 19.21 K/UL — HIGH (ref 3.8–10.5)
WBC # FLD AUTO: 21.15 K/UL — HIGH (ref 3.8–10.5)
WBC # FLD AUTO: 21.68 K/UL — HIGH (ref 3.8–10.5)
WBC # FLD AUTO: 25.31 K/UL — HIGH (ref 3.8–10.5)
WBC # FLD AUTO: 26.5 K/UL — HIGH (ref 3.8–10.5)
WBC # FLD AUTO: 30.24 K/UL — HIGH (ref 3.8–10.5)
WBC # FLD AUTO: 31.95 K/UL — HIGH (ref 3.8–10.5)
WBC # FLD AUTO: 5.57 K/UL — SIGNIFICANT CHANGE UP (ref 3.8–10.5)
WBC # FLD AUTO: 5.79 K/UL — SIGNIFICANT CHANGE UP (ref 3.8–10.5)
WBC # FLD AUTO: 6.57 K/UL — SIGNIFICANT CHANGE UP (ref 3.8–10.5)
WBC # FLD AUTO: 6.64 K/UL — SIGNIFICANT CHANGE UP (ref 3.8–10.5)
WBC # FLD AUTO: 6.87 K/UL — SIGNIFICANT CHANGE UP (ref 3.8–10.5)
WBC # FLD AUTO: 6.94 K/UL — SIGNIFICANT CHANGE UP (ref 3.8–10.5)
WBC # FLD AUTO: 7.36 K/UL — SIGNIFICANT CHANGE UP (ref 3.8–10.5)
WBC # FLD AUTO: 7.47 K/UL — SIGNIFICANT CHANGE UP (ref 3.8–10.5)
WBC # FLD AUTO: 7.63 K/UL — SIGNIFICANT CHANGE UP (ref 3.8–10.5)
WBC # FLD AUTO: 7.68 K/UL — SIGNIFICANT CHANGE UP (ref 3.8–10.5)
WBC # FLD AUTO: 8.26 K/UL — SIGNIFICANT CHANGE UP (ref 3.8–10.5)
WBC # FLD AUTO: 8.47 K/UL — SIGNIFICANT CHANGE UP (ref 3.8–10.5)
WBC # FLD AUTO: 8.64 K/UL — SIGNIFICANT CHANGE UP (ref 3.8–10.5)
WBC # FLD AUTO: 8.99 K/UL — SIGNIFICANT CHANGE UP (ref 3.8–10.5)
WBC # FLD AUTO: 9.09 K/UL — SIGNIFICANT CHANGE UP (ref 3.8–10.5)
WBC # FLD AUTO: 9.24 K/UL — SIGNIFICANT CHANGE UP (ref 3.8–10.5)

## 2021-01-01 PROCEDURE — 99239 HOSP IP/OBS DSCHRG MGMT >30: CPT

## 2021-01-01 PROCEDURE — 99232 SBSQ HOSP IP/OBS MODERATE 35: CPT | Mod: GC

## 2021-01-01 PROCEDURE — 88342 IMHCHEM/IMCYTCHM 1ST ANTB: CPT

## 2021-01-01 PROCEDURE — 77012 CT SCAN FOR NEEDLE BIOPSY: CPT | Mod: 26

## 2021-01-01 PROCEDURE — 71045 X-RAY EXAM CHEST 1 VIEW: CPT | Mod: 26

## 2021-01-01 PROCEDURE — 88307 TISSUE EXAM BY PATHOLOGIST: CPT | Mod: 26

## 2021-01-01 PROCEDURE — 43242 EGD US FINE NEEDLE BX/ASPIR: CPT | Mod: 59

## 2021-01-01 PROCEDURE — 99285 EMERGENCY DEPT VISIT HI MDM: CPT

## 2021-01-01 PROCEDURE — 93010 ELECTROCARDIOGRAM REPORT: CPT

## 2021-01-01 PROCEDURE — 86901 BLOOD TYPING SEROLOGIC RH(D): CPT

## 2021-01-01 PROCEDURE — 86704 HEP B CORE ANTIBODY TOTAL: CPT

## 2021-01-01 PROCEDURE — 88173 CYTOPATH EVAL FNA REPORT: CPT

## 2021-01-01 PROCEDURE — 88305 TISSUE EXAM BY PATHOLOGIST: CPT | Mod: 26

## 2021-01-01 PROCEDURE — 99233 SBSQ HOSP IP/OBS HIGH 50: CPT

## 2021-01-01 PROCEDURE — 74177 CT ABD & PELVIS W/CONTRAST: CPT | Mod: 26,MG

## 2021-01-01 PROCEDURE — 76705 ECHO EXAM OF ABDOMEN: CPT | Mod: 26

## 2021-01-01 PROCEDURE — 83690 ASSAY OF LIPASE: CPT

## 2021-01-01 PROCEDURE — 88173 CYTOPATH EVAL FNA REPORT: CPT | Mod: 26

## 2021-01-01 PROCEDURE — 47000 NEEDLE BIOPSY OF LIVER PERQ: CPT

## 2021-01-01 PROCEDURE — 85610 PROTHROMBIN TIME: CPT

## 2021-01-01 PROCEDURE — 74177 CT ABD & PELVIS W/CONTRAST: CPT

## 2021-01-01 PROCEDURE — 47534 PLMT BILIARY DRAINAGE CATH: CPT

## 2021-01-01 PROCEDURE — 99223 1ST HOSP IP/OBS HIGH 75: CPT

## 2021-01-01 PROCEDURE — 99285 EMERGENCY DEPT VISIT HI MDM: CPT | Mod: GC

## 2021-01-01 PROCEDURE — 49905 OMENTAL FLAP INTRA-ABDOM: CPT

## 2021-01-01 PROCEDURE — 86769 SARS-COV-2 COVID-19 ANTIBODY: CPT

## 2021-01-01 PROCEDURE — 99024 POSTOP FOLLOW-UP VISIT: CPT

## 2021-01-01 PROCEDURE — 74018 RADEX ABDOMEN 1 VIEW: CPT | Mod: 26

## 2021-01-01 PROCEDURE — 86705 HEP B CORE ANTIBODY IGM: CPT

## 2021-01-01 PROCEDURE — 71260 CT THORAX DX C+: CPT | Mod: 26

## 2021-01-01 PROCEDURE — 99498 ADVNCD CARE PLAN ADDL 30 MIN: CPT | Mod: 25

## 2021-01-01 PROCEDURE — 99215 OFFICE O/P EST HI 40 MIN: CPT

## 2021-01-01 PROCEDURE — 49402 REMOVE FOREIGN BODY ADBOMEN: CPT

## 2021-01-01 PROCEDURE — 74330 X-RAY BILE/PANC ENDOSCOPY: CPT

## 2021-01-01 PROCEDURE — 36415 COLL VENOUS BLD VENIPUNCTURE: CPT

## 2021-01-01 PROCEDURE — 99205 OFFICE O/P NEW HI 60 MIN: CPT

## 2021-01-01 PROCEDURE — 86803 HEPATITIS C AB TEST: CPT

## 2021-01-01 PROCEDURE — 99222 1ST HOSP IP/OBS MODERATE 55: CPT

## 2021-01-01 PROCEDURE — 99204 OFFICE O/P NEW MOD 45 MIN: CPT | Mod: 25

## 2021-01-01 PROCEDURE — 80048 BASIC METABOLIC PNL TOTAL CA: CPT

## 2021-01-01 PROCEDURE — 71045 X-RAY EXAM CHEST 1 VIEW: CPT | Mod: 26,76

## 2021-01-01 PROCEDURE — 93005 ELECTROCARDIOGRAM TRACING: CPT

## 2021-01-01 PROCEDURE — 87521 HEPATITIS C PROBE&RVRS TRNSC: CPT

## 2021-01-01 PROCEDURE — 93306 TTE W/DOPPLER COMPLETE: CPT

## 2021-01-01 PROCEDURE — U0005: CPT

## 2021-01-01 PROCEDURE — 87340 HEPATITIS B SURFACE AG IA: CPT

## 2021-01-01 PROCEDURE — 99291 CRITICAL CARE FIRST HOUR: CPT | Mod: 25

## 2021-01-01 PROCEDURE — 80053 COMPREHEN METABOLIC PANEL: CPT

## 2021-01-01 PROCEDURE — 88309 TISSUE EXAM BY PATHOLOGIST: CPT | Mod: 26

## 2021-01-01 PROCEDURE — 99497 ADVNCD CARE PLAN 30 MIN: CPT | Mod: 25

## 2021-01-01 PROCEDURE — 86850 RBC ANTIBODY SCREEN: CPT

## 2021-01-01 PROCEDURE — 85025 COMPLETE CBC W/AUTO DIFF WBC: CPT

## 2021-01-01 PROCEDURE — 43239 EGD BIOPSY SINGLE/MULTIPLE: CPT | Mod: 59

## 2021-01-01 PROCEDURE — 99232 SBSQ HOSP IP/OBS MODERATE 35: CPT

## 2021-01-01 PROCEDURE — 99233 SBSQ HOSP IP/OBS HIGH 50: CPT | Mod: 25

## 2021-01-01 PROCEDURE — 99291 CRITICAL CARE FIRST HOUR: CPT

## 2021-01-01 PROCEDURE — C1769: CPT

## 2021-01-01 PROCEDURE — 87389 HIV-1 AG W/HIV-1&-2 AB AG IA: CPT

## 2021-01-01 PROCEDURE — 74177 CT ABD & PELVIS W/CONTRAST: CPT | Mod: 26

## 2021-01-01 PROCEDURE — 43274 ERCP DUCT STENT PLACEMENT: CPT

## 2021-01-01 PROCEDURE — 99223 1ST HOSP IP/OBS HIGH 75: CPT | Mod: GC

## 2021-01-01 PROCEDURE — C2625: CPT

## 2021-01-01 PROCEDURE — 85730 THROMBOPLASTIN TIME PARTIAL: CPT

## 2021-01-01 PROCEDURE — 88305 TISSUE EXAM BY PATHOLOGIST: CPT

## 2021-01-01 PROCEDURE — 85027 COMPLETE CBC AUTOMATED: CPT

## 2021-01-01 PROCEDURE — 38747 REMOVE ABDOMINAL LYMPH NODES: CPT | Mod: 59

## 2021-01-01 PROCEDURE — 48153 PANCREATECTOMY: CPT

## 2021-01-01 PROCEDURE — 88300 SURGICAL PATH GROSS: CPT | Mod: 26,59

## 2021-01-01 PROCEDURE — 49320 DIAG LAPARO SEPARATE PROC: CPT | Mod: 59

## 2021-01-01 PROCEDURE — 86301 IMMUNOASSAY TUMOR CA 19-9: CPT

## 2021-01-01 PROCEDURE — 80076 HEPATIC FUNCTION PANEL: CPT

## 2021-01-01 PROCEDURE — 86706 HEP B SURFACE ANTIBODY: CPT

## 2021-01-01 PROCEDURE — 88304 TISSUE EXAM BY PATHOLOGIST: CPT | Mod: 26

## 2021-01-01 PROCEDURE — G1004: CPT

## 2021-01-01 PROCEDURE — 47420 INCISION OF BILE DUCT: CPT

## 2021-01-01 PROCEDURE — 86900 BLOOD TYPING SEROLOGIC ABO: CPT

## 2021-01-01 PROCEDURE — U0003: CPT

## 2021-01-01 PROCEDURE — 99358 PROLONG SERVICE W/O CONTACT: CPT

## 2021-01-01 PROCEDURE — 93000 ELECTROCARDIOGRAM COMPLETE: CPT | Mod: NC

## 2021-01-01 PROCEDURE — 99231 SBSQ HOSP IP/OBS SF/LOW 25: CPT

## 2021-01-01 PROCEDURE — C1773: CPT

## 2021-01-01 RX ORDER — PROCHLORPERAZINE MALEATE 5 MG
25 TABLET ORAL EVERY 6 HOURS
Refills: 0 | Status: DISCONTINUED | OUTPATIENT
Start: 2021-01-01 | End: 2021-01-01

## 2021-01-01 RX ORDER — POTASSIUM PHOSPHATE, MONOBASIC POTASSIUM PHOSPHATE, DIBASIC 236; 224 MG/ML; MG/ML
15 INJECTION, SOLUTION INTRAVENOUS ONCE
Refills: 0 | Status: COMPLETED | OUTPATIENT
Start: 2021-01-01 | End: 2021-01-01

## 2021-01-01 RX ORDER — HYDROMORPHONE HYDROCHLORIDE 2 MG/ML
0.5 INJECTION INTRAMUSCULAR; INTRAVENOUS; SUBCUTANEOUS
Refills: 0 | Status: DISCONTINUED | OUTPATIENT
Start: 2021-01-01 | End: 2021-01-01

## 2021-01-01 RX ORDER — PIPERACILLIN AND TAZOBACTAM 4; .5 G/20ML; G/20ML
3.38 INJECTION, POWDER, LYOPHILIZED, FOR SOLUTION INTRAVENOUS EVERY 8 HOURS
Refills: 0 | Status: DISCONTINUED | OUTPATIENT
Start: 2021-01-01 | End: 2021-01-01

## 2021-01-01 RX ORDER — SODIUM,POTASSIUM PHOSPHATES 278-250MG
1 POWDER IN PACKET (EA) ORAL EVERY 8 HOURS
Refills: 0 | Status: COMPLETED | OUTPATIENT
Start: 2021-01-01 | End: 2021-01-01

## 2021-01-01 RX ORDER — POTASSIUM PHOSPHATE, MONOBASIC POTASSIUM PHOSPHATE, DIBASIC 236; 224 MG/ML; MG/ML
30 INJECTION, SOLUTION INTRAVENOUS ONCE
Refills: 0 | Status: COMPLETED | OUTPATIENT
Start: 2021-01-01 | End: 2021-01-01

## 2021-01-01 RX ORDER — OXYCODONE HYDROCHLORIDE 5 MG/1
1 TABLET ORAL
Qty: 5 | Refills: 0
Start: 2021-01-01 | End: 2021-01-01

## 2021-01-01 RX ORDER — SODIUM CHLORIDE 9 MG/ML
500 INJECTION INTRAMUSCULAR; INTRAVENOUS; SUBCUTANEOUS ONCE
Refills: 0 | Status: COMPLETED | OUTPATIENT
Start: 2021-01-01 | End: 2021-01-01

## 2021-01-01 RX ORDER — SODIUM CHLORIDE 9 MG/ML
1000 INJECTION INTRAMUSCULAR; INTRAVENOUS; SUBCUTANEOUS ONCE
Refills: 0 | Status: COMPLETED | OUTPATIENT
Start: 2021-01-01 | End: 2021-01-01

## 2021-01-01 RX ORDER — PIPERACILLIN AND TAZOBACTAM 4; .5 G/20ML; G/20ML
3.38 INJECTION, POWDER, LYOPHILIZED, FOR SOLUTION INTRAVENOUS ONCE
Refills: 0 | Status: COMPLETED | OUTPATIENT
Start: 2021-01-01 | End: 2021-01-01

## 2021-01-01 RX ORDER — SODIUM CHLORIDE 9 MG/ML
1000 INJECTION, SOLUTION INTRAVENOUS
Refills: 0 | Status: DISCONTINUED | OUTPATIENT
Start: 2021-01-01 | End: 2021-01-01

## 2021-01-01 RX ORDER — INDOMETHACIN 50 MG
100 CAPSULE ORAL ONCE
Refills: 0 | Status: DISCONTINUED | OUTPATIENT
Start: 2021-01-01 | End: 2021-01-01

## 2021-01-01 RX ORDER — POTASSIUM CHLORIDE 20 MEQ
40 PACKET (EA) ORAL ONCE
Refills: 0 | Status: COMPLETED | OUTPATIENT
Start: 2021-01-01 | End: 2021-01-01

## 2021-01-01 RX ORDER — POTASSIUM CHLORIDE 20 MEQ
10 PACKET (EA) ORAL
Refills: 0 | Status: COMPLETED | OUTPATIENT
Start: 2021-01-01 | End: 2021-01-01

## 2021-01-01 RX ORDER — AMPICILLIN SODIUM AND SULBACTAM SODIUM 250; 125 MG/ML; MG/ML
INJECTION, POWDER, FOR SUSPENSION INTRAMUSCULAR; INTRAVENOUS
Refills: 0 | Status: DISCONTINUED | OUTPATIENT
Start: 2021-01-01 | End: 2021-01-01

## 2021-01-01 RX ORDER — CLARITHROMYCIN 500 MG/1
500 TABLET, FILM COATED ORAL
Qty: 28 | Refills: 0 | Status: DISCONTINUED | COMMUNITY
Start: 2021-01-01 | End: 2021-01-01

## 2021-01-01 RX ORDER — NALOXONE HYDROCHLORIDE 4 MG/.1ML
0.1 SPRAY NASAL
Refills: 0 | Status: DISCONTINUED | OUTPATIENT
Start: 2021-01-01 | End: 2021-01-01

## 2021-01-01 RX ORDER — HYDRALAZINE HCL 50 MG
10 TABLET ORAL ONCE
Refills: 0 | Status: COMPLETED | OUTPATIENT
Start: 2021-01-01 | End: 2021-01-01

## 2021-01-01 RX ORDER — BACLOFEN 100 %
5 POWDER (GRAM) MISCELLANEOUS EVERY 8 HOURS
Refills: 0 | Status: COMPLETED | OUTPATIENT
Start: 2021-01-01 | End: 2021-01-01

## 2021-01-01 RX ORDER — PHYTONADIONE (VIT K1) 5 MG
10 TABLET ORAL ONCE
Refills: 0 | Status: COMPLETED | OUTPATIENT
Start: 2021-01-01 | End: 2021-01-01

## 2021-01-01 RX ORDER — SUCRALFATE 1 G/1
1 TABLET ORAL 4 TIMES DAILY
Qty: 120 | Refills: 0 | Status: ACTIVE | COMMUNITY
Start: 2021-01-01 | End: 1900-01-01

## 2021-01-01 RX ORDER — I.V. FAT EMULSION 20 G/100ML
8.3 EMULSION INTRAVENOUS
Qty: 20 | Refills: 0 | Status: DISCONTINUED | OUTPATIENT
Start: 2021-01-01 | End: 2021-01-01

## 2021-01-01 RX ORDER — PROCHLORPERAZINE MALEATE 10 MG/1
10 TABLET ORAL
Qty: 30 | Refills: 2 | Status: ACTIVE | COMMUNITY
Start: 2021-01-01 | End: 1900-01-01

## 2021-01-01 RX ORDER — METOCLOPRAMIDE HCL 10 MG
10 TABLET ORAL ONCE
Refills: 0 | Status: COMPLETED | OUTPATIENT
Start: 2021-01-01 | End: 2021-01-01

## 2021-01-01 RX ORDER — FUROSEMIDE 40 MG
20 TABLET ORAL ONCE
Refills: 0 | Status: COMPLETED | OUTPATIENT
Start: 2021-01-01 | End: 2021-01-01

## 2021-01-01 RX ORDER — ELECTROLYTE SOLUTION,INJ
1 VIAL (ML) INTRAVENOUS
Refills: 0 | Status: DISCONTINUED | OUTPATIENT
Start: 2021-01-01 | End: 2021-01-01

## 2021-01-01 RX ORDER — AMPICILLIN SODIUM AND SULBACTAM SODIUM 250; 125 MG/ML; MG/ML
3 INJECTION, POWDER, FOR SUSPENSION INTRAMUSCULAR; INTRAVENOUS EVERY 6 HOURS
Refills: 0 | Status: DISCONTINUED | OUTPATIENT
Start: 2021-01-01 | End: 2021-01-01

## 2021-01-01 RX ORDER — POTASSIUM PHOSPHATE, MONOBASIC POTASSIUM PHOSPHATE, DIBASIC 236; 224 MG/ML; MG/ML
30 INJECTION, SOLUTION INTRAVENOUS ONCE
Refills: 0 | Status: DISCONTINUED | OUTPATIENT
Start: 2021-01-01 | End: 2021-01-01

## 2021-01-01 RX ORDER — MAGNESIUM SULFATE 500 MG/ML
2 VIAL (ML) INJECTION ONCE
Refills: 0 | Status: COMPLETED | OUTPATIENT
Start: 2021-01-01 | End: 2021-01-01

## 2021-01-01 RX ORDER — FLUCONAZOLE 150 MG/1
400 TABLET ORAL EVERY 24 HOURS
Refills: 0 | Status: DISCONTINUED | OUTPATIENT
Start: 2021-01-01 | End: 2021-01-01

## 2021-01-01 RX ORDER — MORPHINE SULFATE 50 MG/1
0.2 CAPSULE, EXTENDED RELEASE ORAL ONCE
Refills: 0 | Status: DISCONTINUED | OUTPATIENT
Start: 2021-01-01 | End: 2021-01-01

## 2021-01-01 RX ORDER — CIPROFLOXACIN LACTATE 400MG/40ML
400 VIAL (ML) INTRAVENOUS EVERY 12 HOURS
Refills: 0 | Status: DISCONTINUED | OUTPATIENT
Start: 2021-01-01 | End: 2021-01-01

## 2021-01-01 RX ORDER — MAGNESIUM SULFATE 500 MG/ML
1 VIAL (ML) INJECTION ONCE
Refills: 0 | Status: COMPLETED | OUTPATIENT
Start: 2021-01-01 | End: 2021-01-01

## 2021-01-01 RX ORDER — PROCHLORPERAZINE MALEATE 5 MG
10 TABLET ORAL EVERY 6 HOURS
Refills: 0 | Status: DISCONTINUED | OUTPATIENT
Start: 2021-01-01 | End: 2021-01-01

## 2021-01-01 RX ORDER — METRONIDAZOLE 250 MG/1
250 TABLET ORAL
Qty: 3 | Refills: 0 | Status: DISCONTINUED | COMMUNITY
Start: 2021-01-01 | End: 2021-01-01

## 2021-01-01 RX ORDER — CIPROFLOXACIN LACTATE 400MG/40ML
400 VIAL (ML) INTRAVENOUS ONCE
Refills: 0 | Status: DISCONTINUED | OUTPATIENT
Start: 2021-01-01 | End: 2021-01-01

## 2021-01-01 RX ORDER — ENOXAPARIN SODIUM 100 MG/ML
40 INJECTION SUBCUTANEOUS DAILY
Refills: 0 | Status: DISCONTINUED | OUTPATIENT
Start: 2021-01-01 | End: 2021-01-01

## 2021-01-01 RX ORDER — POTASSIUM PHOSPHATE, MONOBASIC POTASSIUM PHOSPHATE, DIBASIC 236; 224 MG/ML; MG/ML
30 INJECTION, SOLUTION INTRAVENOUS EVERY 6 HOURS
Refills: 0 | Status: COMPLETED | OUTPATIENT
Start: 2021-01-01 | End: 2021-01-01

## 2021-01-01 RX ORDER — SODIUM CHLORIDE 9 MG/ML
500 INJECTION, SOLUTION INTRAVENOUS ONCE
Refills: 0 | Status: DISCONTINUED | OUTPATIENT
Start: 2021-01-01 | End: 2021-01-01

## 2021-01-01 RX ORDER — MIRTAZAPINE 15 MG/1
15 TABLET, FILM COATED ORAL
Qty: 30 | Refills: 4 | Status: ACTIVE | COMMUNITY
Start: 2021-01-01 | End: 1900-01-01

## 2021-01-01 RX ORDER — ACETAMINOPHEN 500 MG
1000 TABLET ORAL EVERY 6 HOURS
Refills: 0 | Status: COMPLETED | OUTPATIENT
Start: 2021-01-01 | End: 2021-01-01

## 2021-01-01 RX ORDER — CALCITONIN SALMON 200 [IU]/ML
290 INJECTION, SOLUTION INTRAMUSCULAR EVERY 12 HOURS
Refills: 0 | Status: COMPLETED | OUTPATIENT
Start: 2021-01-01 | End: 2021-01-01

## 2021-01-01 RX ORDER — SODIUM,POTASSIUM PHOSPHATES 278-250MG
2 POWDER IN PACKET (EA) ORAL ONCE
Refills: 0 | Status: COMPLETED | OUTPATIENT
Start: 2021-01-01 | End: 2021-01-01

## 2021-01-01 RX ORDER — CLARITHROMYCIN 500 MG
1 TABLET ORAL
Qty: 0 | Refills: 0 | DISCHARGE

## 2021-01-01 RX ORDER — HYDROMORPHONE HYDROCHLORIDE 2 MG/ML
0.5 INJECTION INTRAMUSCULAR; INTRAVENOUS; SUBCUTANEOUS ONCE
Refills: 0 | Status: DISCONTINUED | OUTPATIENT
Start: 2021-01-01 | End: 2021-01-01

## 2021-01-01 RX ORDER — SODIUM CHLORIDE 9 MG/ML
2 INJECTION INTRAMUSCULAR; INTRAVENOUS; SUBCUTANEOUS EVERY 8 HOURS
Refills: 0 | Status: DISCONTINUED | OUTPATIENT
Start: 2021-01-01 | End: 2021-01-01

## 2021-01-01 RX ORDER — SODIUM CHLORIDE 9 MG/ML
1000 INJECTION INTRAMUSCULAR; INTRAVENOUS; SUBCUTANEOUS
Refills: 0 | Status: DISCONTINUED | OUTPATIENT
Start: 2021-01-01 | End: 2021-01-01

## 2021-01-01 RX ORDER — POTASSIUM CHLORIDE 20 MEQ
10 PACKET (EA) ORAL ONCE
Refills: 0 | Status: DISCONTINUED | OUTPATIENT
Start: 2021-01-01 | End: 2021-01-01

## 2021-01-01 RX ORDER — ONDANSETRON 8 MG/1
4 TABLET, FILM COATED ORAL EVERY 6 HOURS
Refills: 0 | Status: DISCONTINUED | OUTPATIENT
Start: 2021-01-01 | End: 2021-01-01

## 2021-01-01 RX ORDER — FENTANYL CITRATE 50 UG/ML
25 INJECTION INTRAVENOUS
Refills: 0 | Status: DISCONTINUED | OUTPATIENT
Start: 2021-01-01 | End: 2021-01-01

## 2021-01-01 RX ORDER — AMOXICILLIN 500 MG/1
500 CAPSULE ORAL TWICE DAILY
Qty: 56 | Refills: 0 | Status: DISCONTINUED | COMMUNITY
Start: 2021-01-01 | End: 2021-01-01

## 2021-01-01 RX ORDER — PAMIDRONATE DISODIUM 9 MG/ML
60 INJECTION, SOLUTION INTRAVENOUS ONCE
Refills: 0 | Status: COMPLETED | OUTPATIENT
Start: 2021-01-01 | End: 2021-01-01

## 2021-01-01 RX ORDER — IOHEXOL 300 MG/ML
30 INJECTION, SOLUTION INTRAVENOUS ONCE
Refills: 0 | Status: COMPLETED | OUTPATIENT
Start: 2021-01-01 | End: 2021-01-01

## 2021-01-01 RX ORDER — OMEPRAZOLE 20 MG/1
20 CAPSULE, DELAYED RELEASE ORAL
Refills: 0 | Status: DISCONTINUED | COMMUNITY
End: 2021-01-01

## 2021-01-01 RX ORDER — POTASSIUM CHLORIDE 20 MEQ
40 PACKET (EA) ORAL EVERY 4 HOURS
Refills: 0 | Status: COMPLETED | OUTPATIENT
Start: 2021-01-01 | End: 2021-01-01

## 2021-01-01 RX ORDER — AMLODIPINE BESYLATE 2.5 MG/1
2.5 TABLET ORAL DAILY
Refills: 0 | Status: DISCONTINUED | OUTPATIENT
Start: 2021-01-01 | End: 2021-01-01

## 2021-01-01 RX ORDER — HYDROMORPHONE HYDROCHLORIDE 2 MG/ML
1 INJECTION INTRAMUSCULAR; INTRAVENOUS; SUBCUTANEOUS
Refills: 0 | Status: DISCONTINUED | OUTPATIENT
Start: 2021-01-01 | End: 2021-01-01

## 2021-01-01 RX ORDER — HYDROMORPHONE HYDROCHLORIDE 2 MG/ML
30 INJECTION INTRAMUSCULAR; INTRAVENOUS; SUBCUTANEOUS
Refills: 0 | Status: DISCONTINUED | OUTPATIENT
Start: 2021-01-01 | End: 2021-01-01

## 2021-01-01 RX ORDER — CIPROFLOXACIN LACTATE 400MG/40ML
400 VIAL (ML) INTRAVENOUS ONCE
Refills: 0 | Status: COMPLETED | OUTPATIENT
Start: 2021-01-01 | End: 2021-01-01

## 2021-01-01 RX ORDER — HYDROMORPHONE HYDROCHLORIDE 2 MG/ML
0.25 INJECTION INTRAMUSCULAR; INTRAVENOUS; SUBCUTANEOUS
Refills: 0 | Status: DISCONTINUED | OUTPATIENT
Start: 2021-01-01 | End: 2021-01-01

## 2021-01-01 RX ORDER — POTASSIUM CHLORIDE 1500 MG/1
20 TABLET, FILM COATED, EXTENDED RELEASE ORAL AS DIRECTED
Qty: 4 | Refills: 0 | Status: DISCONTINUED | COMMUNITY
Start: 2021-01-01 | End: 2021-01-01

## 2021-01-01 RX ORDER — ACETAMINOPHEN 500 MG
1000 TABLET ORAL ONCE
Refills: 0 | Status: COMPLETED | OUTPATIENT
Start: 2021-01-01 | End: 2021-01-01

## 2021-01-01 RX ORDER — OMEPRAZOLE 20 MG/1
20 CAPSULE, DELAYED RELEASE ORAL TWICE DAILY
Qty: 28 | Refills: 0 | Status: ACTIVE | COMMUNITY
Start: 2021-01-01 | End: 1900-01-01

## 2021-01-01 RX ORDER — AMPICILLIN SODIUM AND SULBACTAM SODIUM 250; 125 MG/ML; MG/ML
3 INJECTION, POWDER, FOR SUSPENSION INTRAMUSCULAR; INTRAVENOUS ONCE
Refills: 0 | Status: COMPLETED | OUTPATIENT
Start: 2021-01-01 | End: 2021-01-01

## 2021-01-01 RX ORDER — POTASSIUM CHLORIDE 20 MEQ
10 PACKET (EA) ORAL
Refills: 0 | Status: DISCONTINUED | OUTPATIENT
Start: 2021-01-01 | End: 2021-01-01

## 2021-01-01 RX ORDER — OMEPRAZOLE 10 MG/1
1 CAPSULE, DELAYED RELEASE ORAL
Qty: 0 | Refills: 0 | DISCHARGE

## 2021-01-01 RX ORDER — NEOMYCIN SULFATE 500 MG/1
500 TABLET ORAL
Qty: 6 | Refills: 0 | Status: DISCONTINUED | COMMUNITY
Start: 2021-01-01 | End: 2021-01-01

## 2021-01-01 RX ORDER — METOPROLOL TARTRATE 50 MG
5 TABLET ORAL EVERY 6 HOURS
Refills: 0 | Status: DISCONTINUED | OUTPATIENT
Start: 2021-01-01 | End: 2021-01-01

## 2021-01-01 RX ORDER — ONDANSETRON 8 MG/1
4 TABLET, FILM COATED ORAL ONCE
Refills: 0 | Status: COMPLETED | OUTPATIENT
Start: 2021-01-01 | End: 2021-01-01

## 2021-01-01 RX ORDER — HEPARIN SODIUM 5000 [USP'U]/ML
5000 INJECTION INTRAVENOUS; SUBCUTANEOUS EVERY 8 HOURS
Refills: 0 | Status: DISCONTINUED | OUTPATIENT
Start: 2021-01-01 | End: 2021-01-01

## 2021-01-01 RX ORDER — MORPHINE SULFATE 50 MG/1
1 CAPSULE, EXTENDED RELEASE ORAL
Qty: 100 | Refills: 0 | Status: DISCONTINUED | OUTPATIENT
Start: 2021-01-01 | End: 2021-01-01

## 2021-01-01 RX ORDER — ACETAMINOPHEN 500 MG
975 TABLET ORAL EVERY 6 HOURS
Refills: 0 | Status: DISCONTINUED | OUTPATIENT
Start: 2021-01-01 | End: 2021-01-01

## 2021-01-01 RX ORDER — DIATRIZOATE MEGLUMINE 180 MG/ML
120 INJECTION, SOLUTION INTRAVESICAL ONCE
Refills: 0 | Status: DISCONTINUED | OUTPATIENT
Start: 2021-01-01 | End: 2021-01-01

## 2021-01-01 RX ORDER — POTASSIUM CHLORIDE 20 MEQ
10 PACKET (EA) ORAL DAILY
Refills: 0 | Status: DISCONTINUED | OUTPATIENT
Start: 2021-01-01 | End: 2021-01-01

## 2021-01-01 RX ORDER — FLUCONAZOLE 150 MG/1
TABLET ORAL
Refills: 0 | Status: DISCONTINUED | OUTPATIENT
Start: 2021-01-01 | End: 2021-01-01

## 2021-01-01 RX ORDER — FLUCONAZOLE 150 MG/1
400 TABLET ORAL ONCE
Refills: 0 | Status: COMPLETED | OUTPATIENT
Start: 2021-01-01 | End: 2021-01-01

## 2021-01-01 RX ORDER — AMLODIPINE BESYLATE 2.5 MG/1
1 TABLET ORAL
Qty: 30 | Refills: 0
Start: 2021-01-01 | End: 2021-01-01

## 2021-01-01 RX ORDER — SODIUM CHLORIDE 9 MG/ML
1 INJECTION INTRAMUSCULAR; INTRAVENOUS; SUBCUTANEOUS EVERY 8 HOURS
Refills: 0 | Status: DISCONTINUED | OUTPATIENT
Start: 2021-01-01 | End: 2021-01-01

## 2021-01-01 RX ORDER — AMLODIPINE BESYLATE 2.5 MG/1
2.5 TABLET ORAL ONCE
Refills: 0 | Status: COMPLETED | OUTPATIENT
Start: 2021-01-01 | End: 2021-01-01

## 2021-01-01 RX ORDER — FLUCONAZOLE 150 MG/1
2 TABLET ORAL
Qty: 4 | Refills: 0
Start: 2021-01-01 | End: 2021-01-01

## 2021-01-01 RX ORDER — PANTOPRAZOLE SODIUM 20 MG/1
40 TABLET, DELAYED RELEASE ORAL
Refills: 0 | Status: DISCONTINUED | OUTPATIENT
Start: 2021-01-01 | End: 2021-01-01

## 2021-01-01 RX ORDER — POTASSIUM CHLORIDE 20 MEQ
20 PACKET (EA) ORAL ONCE
Refills: 0 | Status: COMPLETED | OUTPATIENT
Start: 2021-01-01 | End: 2021-01-01

## 2021-01-01 RX ORDER — SODIUM CHLORIDE 9 MG/ML
500 INJECTION, SOLUTION INTRAVENOUS ONCE
Refills: 0 | Status: COMPLETED | OUTPATIENT
Start: 2021-01-01 | End: 2021-01-01

## 2021-01-01 RX ORDER — MORPHINE SULFATE 50 MG/1
0.5 CAPSULE, EXTENDED RELEASE ORAL
Qty: 100 | Refills: 0 | Status: DISCONTINUED | OUTPATIENT
Start: 2021-01-01 | End: 2021-01-01

## 2021-01-01 RX ORDER — POTASSIUM CHLORIDE 20 MEQ
20 PACKET (EA) ORAL
Refills: 0 | Status: DISCONTINUED | OUTPATIENT
Start: 2021-01-01 | End: 2021-01-01

## 2021-01-01 RX ORDER — I.V. FAT EMULSION 20 G/100ML
15.8 EMULSION INTRAVENOUS
Qty: 38 | Refills: 0 | Status: DISCONTINUED | OUTPATIENT
Start: 2021-01-01 | End: 2021-01-01

## 2021-01-01 RX ORDER — ONDANSETRON 8 MG/1
4 TABLET, FILM COATED ORAL ONCE
Refills: 0 | Status: DISCONTINUED | OUTPATIENT
Start: 2021-01-01 | End: 2021-01-01

## 2021-01-01 RX ORDER — AMLODIPINE BESYLATE 2.5 MG/1
5 TABLET ORAL ONCE
Refills: 0 | Status: COMPLETED | OUTPATIENT
Start: 2021-01-01 | End: 2021-01-01

## 2021-01-01 RX ORDER — ONDANSETRON 8 MG/1
8 TABLET ORAL EVERY 8 HOURS
Qty: 60 | Refills: 2 | Status: ACTIVE | COMMUNITY
Start: 2021-01-01 | End: 1900-01-01

## 2021-01-01 RX ORDER — OXYCODONE HYDROCHLORIDE 5 MG/1
10 TABLET ORAL
Refills: 0 | Status: DISCONTINUED | OUTPATIENT
Start: 2021-01-01 | End: 2021-01-01

## 2021-01-01 RX ORDER — POTASSIUM CHLORIDE 20 MEQ
20 PACKET (EA) ORAL ONCE
Refills: 0 | Status: DISCONTINUED | OUTPATIENT
Start: 2021-01-01 | End: 2021-01-01

## 2021-01-01 RX ORDER — OXYCODONE HYDROCHLORIDE 5 MG/1
5 TABLET ORAL
Refills: 0 | Status: DISCONTINUED | OUTPATIENT
Start: 2021-01-01 | End: 2021-01-01

## 2021-01-01 RX ORDER — CIPROFLOXACIN LACTATE 400MG/40ML
VIAL (ML) INTRAVENOUS
Refills: 0 | Status: DISCONTINUED | OUTPATIENT
Start: 2021-01-01 | End: 2021-01-01

## 2021-01-01 RX ORDER — AMLODIPINE BESYLATE 5 MG/1
5 TABLET ORAL
Qty: 90 | Refills: 3 | Status: ACTIVE | COMMUNITY
Start: 2021-01-01 | End: 1900-01-01

## 2021-01-01 RX ORDER — PANTOPRAZOLE SODIUM 20 MG/1
40 TABLET, DELAYED RELEASE ORAL EVERY 24 HOURS
Refills: 0 | Status: DISCONTINUED | OUTPATIENT
Start: 2021-01-01 | End: 2021-01-01

## 2021-01-01 RX ORDER — SODIUM,POTASSIUM PHOSPHATES 278-250MG
2 POWDER IN PACKET (EA) ORAL
Refills: 0 | Status: DISCONTINUED | OUTPATIENT
Start: 2021-01-01 | End: 2021-01-01

## 2021-01-01 RX ORDER — SODIUM,POTASSIUM PHOSPHATES 278-250MG
1 POWDER IN PACKET (EA) ORAL EVERY 8 HOURS
Refills: 0 | Status: DISCONTINUED | OUTPATIENT
Start: 2021-01-01 | End: 2021-01-01

## 2021-01-01 RX ORDER — METOCLOPRAMIDE 10 MG/1
10 TABLET ORAL 4 TIMES DAILY
Qty: 120 | Refills: 0 | Status: ACTIVE | COMMUNITY
Start: 2021-01-01 | End: 1900-01-01

## 2021-01-01 RX ORDER — PANTOPRAZOLE SODIUM 20 MG/1
40 TABLET, DELAYED RELEASE ORAL DAILY
Refills: 0 | Status: DISCONTINUED | OUTPATIENT
Start: 2021-01-01 | End: 2021-01-01

## 2021-01-01 RX ORDER — DEXTROSE MONOHYDRATE, SODIUM CHLORIDE, AND POTASSIUM CHLORIDE 50; .745; 4.5 G/1000ML; G/1000ML; G/1000ML
1000 INJECTION, SOLUTION INTRAVENOUS
Refills: 0 | Status: DISCONTINUED | OUTPATIENT
Start: 2021-01-01 | End: 2021-01-01

## 2021-01-01 RX ORDER — SODIUM CHLORIDE 9 MG/ML
1000 INJECTION, SOLUTION INTRAVENOUS ONCE
Refills: 0 | Status: COMPLETED | OUTPATIENT
Start: 2021-01-01 | End: 2021-01-01

## 2021-01-01 RX ORDER — AMOXICILLIN 250 MG/5ML
2 SUSPENSION, RECONSTITUTED, ORAL (ML) ORAL
Qty: 0 | Refills: 0 | DISCHARGE
End: 2021-01-01

## 2021-01-01 RX ORDER — SODIUM CHLORIDE 9 MG/ML
500 INJECTION INTRAMUSCULAR; INTRAVENOUS; SUBCUTANEOUS ONCE
Refills: 0 | Status: DISCONTINUED | OUTPATIENT
Start: 2021-01-01 | End: 2021-01-01

## 2021-01-01 RX ORDER — CLONIDINE HYDROCHLORIDE 0.1 MG/1
0.1 TABLET ORAL DAILY
Refills: 0 | Status: DISCONTINUED | COMMUNITY
End: 2021-01-01

## 2021-01-01 RX ORDER — SODIUM,POTASSIUM PHOSPHATES 278-250MG
1 POWDER IN PACKET (EA) ORAL ONCE
Refills: 0 | Status: DISCONTINUED | OUTPATIENT
Start: 2021-01-01 | End: 2021-01-01

## 2021-01-01 RX ORDER — SODIUM CHLORIDE 9 MG/ML
10 INJECTION INTRAMUSCULAR; INTRAVENOUS; SUBCUTANEOUS
Refills: 0 | Status: DISCONTINUED | OUTPATIENT
Start: 2021-01-01 | End: 2021-01-01

## 2021-01-01 RX ORDER — AMLODIPINE BESYLATE 2.5 MG/1
1 TABLET ORAL
Qty: 0 | Refills: 0 | DISCHARGE

## 2021-01-01 RX ORDER — METOPROLOL TARTRATE 50 MG
5 TABLET ORAL ONCE
Refills: 0 | Status: COMPLETED | OUTPATIENT
Start: 2021-01-01 | End: 2021-01-01

## 2021-01-01 RX ORDER — ACETAMINOPHEN 500 MG
500 TABLET ORAL EVERY 6 HOURS
Refills: 0 | Status: COMPLETED | OUTPATIENT
Start: 2021-01-01 | End: 2021-01-01

## 2021-01-01 RX ORDER — CHLORHEXIDINE GLUCONATE 213 G/1000ML
1 SOLUTION TOPICAL
Refills: 0 | Status: DISCONTINUED | OUTPATIENT
Start: 2021-01-01 | End: 2021-01-01

## 2021-01-01 RX ORDER — CALCITONIN SALMON 200 [IU]/ML
290 INJECTION, SOLUTION INTRAMUSCULAR EVERY 12 HOURS
Refills: 0 | Status: DISCONTINUED | OUTPATIENT
Start: 2021-01-01 | End: 2021-01-01

## 2021-01-01 RX ADMIN — SODIUM CHLORIDE 1 GRAM(S): 9 INJECTION INTRAMUSCULAR; INTRAVENOUS; SUBCUTANEOUS at 14:12

## 2021-01-01 RX ADMIN — SODIUM CHLORIDE 1 GRAM(S): 9 INJECTION INTRAMUSCULAR; INTRAVENOUS; SUBCUTANEOUS at 13:41

## 2021-01-01 RX ADMIN — PIPERACILLIN AND TAZOBACTAM 25 GRAM(S): 4; .5 INJECTION, POWDER, LYOPHILIZED, FOR SOLUTION INTRAVENOUS at 05:56

## 2021-01-01 RX ADMIN — Medication 10 MILLIGRAM(S): at 17:23

## 2021-01-01 RX ADMIN — HEPARIN SODIUM 5000 UNIT(S): 5000 INJECTION INTRAVENOUS; SUBCUTANEOUS at 14:24

## 2021-01-01 RX ADMIN — PIPERACILLIN AND TAZOBACTAM 25 GRAM(S): 4; .5 INJECTION, POWDER, LYOPHILIZED, FOR SOLUTION INTRAVENOUS at 00:07

## 2021-01-01 RX ADMIN — PIPERACILLIN AND TAZOBACTAM 25 GRAM(S): 4; .5 INJECTION, POWDER, LYOPHILIZED, FOR SOLUTION INTRAVENOUS at 12:54

## 2021-01-01 RX ADMIN — SODIUM CHLORIDE 2 GRAM(S): 9 INJECTION INTRAMUSCULAR; INTRAVENOUS; SUBCUTANEOUS at 05:53

## 2021-01-01 RX ADMIN — ONDANSETRON 4 MILLIGRAM(S): 8 TABLET, FILM COATED ORAL at 10:29

## 2021-01-01 RX ADMIN — POTASSIUM PHOSPHATE, MONOBASIC POTASSIUM PHOSPHATE, DIBASIC 83.33 MILLIMOLE(S): 236; 224 INJECTION, SOLUTION INTRAVENOUS at 10:53

## 2021-01-01 RX ADMIN — AMPICILLIN SODIUM AND SULBACTAM SODIUM 200 GRAM(S): 250; 125 INJECTION, POWDER, FOR SUSPENSION INTRAMUSCULAR; INTRAVENOUS at 05:27

## 2021-01-01 RX ADMIN — Medication 1 TABLET(S): at 21:25

## 2021-01-01 RX ADMIN — SODIUM CHLORIDE 2000 MILLILITER(S): 9 INJECTION, SOLUTION INTRAVENOUS at 17:09

## 2021-01-01 RX ADMIN — Medication 400 MILLIGRAM(S): at 11:38

## 2021-01-01 RX ADMIN — HYDROMORPHONE HYDROCHLORIDE 30 MILLILITER(S): 2 INJECTION INTRAMUSCULAR; INTRAVENOUS; SUBCUTANEOUS at 07:23

## 2021-01-01 RX ADMIN — ONDANSETRON 4 MILLIGRAM(S): 8 TABLET, FILM COATED ORAL at 07:25

## 2021-01-01 RX ADMIN — Medication 100 MILLIEQUIVALENT(S): at 08:00

## 2021-01-01 RX ADMIN — Medication 40 MILLIEQUIVALENT(S): at 08:44

## 2021-01-01 RX ADMIN — Medication 100 MILLIEQUIVALENT(S): at 06:18

## 2021-01-01 RX ADMIN — SODIUM CHLORIDE 150 MILLILITER(S): 9 INJECTION, SOLUTION INTRAVENOUS at 20:03

## 2021-01-01 RX ADMIN — PIPERACILLIN AND TAZOBACTAM 25 GRAM(S): 4; .5 INJECTION, POWDER, LYOPHILIZED, FOR SOLUTION INTRAVENOUS at 05:15

## 2021-01-01 RX ADMIN — ENOXAPARIN SODIUM 40 MILLIGRAM(S): 100 INJECTION SUBCUTANEOUS at 12:33

## 2021-01-01 RX ADMIN — AMLODIPINE BESYLATE 2.5 MILLIGRAM(S): 2.5 TABLET ORAL at 13:20

## 2021-01-01 RX ADMIN — Medication 5 MILLIGRAM(S): at 05:50

## 2021-01-01 RX ADMIN — Medication 5 MILLIGRAM(S): at 13:34

## 2021-01-01 RX ADMIN — SODIUM CHLORIDE 1 GRAM(S): 9 INJECTION INTRAMUSCULAR; INTRAVENOUS; SUBCUTANEOUS at 21:21

## 2021-01-01 RX ADMIN — PIPERACILLIN AND TAZOBACTAM 25 GRAM(S): 4; .5 INJECTION, POWDER, LYOPHILIZED, FOR SOLUTION INTRAVENOUS at 06:15

## 2021-01-01 RX ADMIN — Medication 1 PACKET(S): at 13:13

## 2021-01-01 RX ADMIN — PIPERACILLIN AND TAZOBACTAM 25 GRAM(S): 4; .5 INJECTION, POWDER, LYOPHILIZED, FOR SOLUTION INTRAVENOUS at 20:44

## 2021-01-01 RX ADMIN — Medication 50 GRAM(S): at 18:47

## 2021-01-01 RX ADMIN — Medication 200 MILLIGRAM(S): at 23:52

## 2021-01-01 RX ADMIN — Medication 0.1 MILLIGRAM(S): at 18:02

## 2021-01-01 RX ADMIN — Medication 500 MILLIGRAM(S): at 03:40

## 2021-01-01 RX ADMIN — PIPERACILLIN AND TAZOBACTAM 25 GRAM(S): 4; .5 INJECTION, POWDER, LYOPHILIZED, FOR SOLUTION INTRAVENOUS at 12:10

## 2021-01-01 RX ADMIN — HEPARIN SODIUM 5000 UNIT(S): 5000 INJECTION INTRAVENOUS; SUBCUTANEOUS at 13:55

## 2021-01-01 RX ADMIN — HEPARIN SODIUM 5000 UNIT(S): 5000 INJECTION INTRAVENOUS; SUBCUTANEOUS at 21:30

## 2021-01-01 RX ADMIN — Medication 100 MILLIEQUIVALENT(S): at 05:10

## 2021-01-01 RX ADMIN — PIPERACILLIN AND TAZOBACTAM 25 GRAM(S): 4; .5 INJECTION, POWDER, LYOPHILIZED, FOR SOLUTION INTRAVENOUS at 21:04

## 2021-01-01 RX ADMIN — PIPERACILLIN AND TAZOBACTAM 200 GRAM(S): 4; .5 INJECTION, POWDER, LYOPHILIZED, FOR SOLUTION INTRAVENOUS at 19:51

## 2021-01-01 RX ADMIN — Medication 5 MILLIGRAM(S): at 21:26

## 2021-01-01 RX ADMIN — Medication 5 MILLIGRAM(S): at 12:45

## 2021-01-01 RX ADMIN — SODIUM CHLORIDE 1 GRAM(S): 9 INJECTION INTRAMUSCULAR; INTRAVENOUS; SUBCUTANEOUS at 21:26

## 2021-01-01 RX ADMIN — ENOXAPARIN SODIUM 40 MILLIGRAM(S): 100 INJECTION SUBCUTANEOUS at 12:14

## 2021-01-01 RX ADMIN — Medication 100 MILLIEQUIVALENT(S): at 00:39

## 2021-01-01 RX ADMIN — SODIUM CHLORIDE 100 MILLILITER(S): 9 INJECTION, SOLUTION INTRAVENOUS at 19:21

## 2021-01-01 RX ADMIN — HEPARIN SODIUM 5000 UNIT(S): 5000 INJECTION INTRAVENOUS; SUBCUTANEOUS at 05:30

## 2021-01-01 RX ADMIN — Medication 5 MILLIGRAM(S): at 22:01

## 2021-01-01 RX ADMIN — Medication 400 MILLIGRAM(S): at 03:41

## 2021-01-01 RX ADMIN — Medication 100 MILLIEQUIVALENT(S): at 19:00

## 2021-01-01 RX ADMIN — Medication 40 MILLIEQUIVALENT(S): at 13:13

## 2021-01-01 RX ADMIN — ENOXAPARIN SODIUM 40 MILLIGRAM(S): 100 INJECTION SUBCUTANEOUS at 12:21

## 2021-01-01 RX ADMIN — Medication 10 MILLIGRAM(S): at 12:40

## 2021-01-01 RX ADMIN — Medication 0.1 MILLIGRAM(S): at 18:40

## 2021-01-01 RX ADMIN — Medication 10 MILLIGRAM(S): at 01:35

## 2021-01-01 RX ADMIN — AMPICILLIN SODIUM AND SULBACTAM SODIUM 200 GRAM(S): 250; 125 INJECTION, POWDER, FOR SUSPENSION INTRAMUSCULAR; INTRAVENOUS at 02:11

## 2021-01-01 RX ADMIN — Medication 100 MILLIEQUIVALENT(S): at 07:43

## 2021-01-01 RX ADMIN — AMPICILLIN SODIUM AND SULBACTAM SODIUM 200 GRAM(S): 250; 125 INJECTION, POWDER, FOR SUSPENSION INTRAMUSCULAR; INTRAVENOUS at 12:33

## 2021-01-01 RX ADMIN — Medication 5 MILLIGRAM(S): at 14:44

## 2021-01-01 RX ADMIN — Medication 5 MILLIGRAM(S): at 00:02

## 2021-01-01 RX ADMIN — Medication 0.1 MILLIGRAM(S): at 05:21

## 2021-01-01 RX ADMIN — SODIUM CHLORIDE 2 GRAM(S): 9 INJECTION INTRAMUSCULAR; INTRAVENOUS; SUBCUTANEOUS at 13:34

## 2021-01-01 RX ADMIN — POTASSIUM PHOSPHATE, MONOBASIC POTASSIUM PHOSPHATE, DIBASIC 62.5 MILLIMOLE(S): 236; 224 INJECTION, SOLUTION INTRAVENOUS at 18:08

## 2021-01-01 RX ADMIN — SODIUM CHLORIDE 1 GRAM(S): 9 INJECTION INTRAMUSCULAR; INTRAVENOUS; SUBCUTANEOUS at 00:02

## 2021-01-01 RX ADMIN — PIPERACILLIN AND TAZOBACTAM 25 GRAM(S): 4; .5 INJECTION, POWDER, LYOPHILIZED, FOR SOLUTION INTRAVENOUS at 13:55

## 2021-01-01 RX ADMIN — Medication 200 MILLIGRAM(S): at 17:15

## 2021-01-01 RX ADMIN — PIPERACILLIN AND TAZOBACTAM 25 GRAM(S): 4; .5 INJECTION, POWDER, LYOPHILIZED, FOR SOLUTION INTRAVENOUS at 13:44

## 2021-01-01 RX ADMIN — SODIUM CHLORIDE 1 GRAM(S): 9 INJECTION INTRAMUSCULAR; INTRAVENOUS; SUBCUTANEOUS at 06:14

## 2021-01-01 RX ADMIN — Medication 1000 MILLIGRAM(S): at 13:12

## 2021-01-01 RX ADMIN — PIPERACILLIN AND TAZOBACTAM 25 GRAM(S): 4; .5 INJECTION, POWDER, LYOPHILIZED, FOR SOLUTION INTRAVENOUS at 21:25

## 2021-01-01 RX ADMIN — Medication 85 MILLIMOLE(S): at 13:10

## 2021-01-01 RX ADMIN — Medication 85 MILLIMOLE(S): at 15:10

## 2021-01-01 RX ADMIN — Medication 100 MILLIEQUIVALENT(S): at 19:14

## 2021-01-01 RX ADMIN — SODIUM CHLORIDE 1 GRAM(S): 9 INJECTION INTRAMUSCULAR; INTRAVENOUS; SUBCUTANEOUS at 22:01

## 2021-01-01 RX ADMIN — Medication 10 MILLIGRAM(S): at 11:08

## 2021-01-01 RX ADMIN — AMPICILLIN SODIUM AND SULBACTAM SODIUM 200 GRAM(S): 250; 125 INJECTION, POWDER, FOR SUSPENSION INTRAMUSCULAR; INTRAVENOUS at 23:58

## 2021-01-01 RX ADMIN — POTASSIUM PHOSPHATE, MONOBASIC POTASSIUM PHOSPHATE, DIBASIC 83.33 MILLIMOLE(S): 236; 224 INJECTION, SOLUTION INTRAVENOUS at 09:14

## 2021-01-01 RX ADMIN — Medication 0.1 MILLIGRAM(S): at 17:27

## 2021-01-01 RX ADMIN — AMLODIPINE BESYLATE 2.5 MILLIGRAM(S): 2.5 TABLET ORAL at 05:03

## 2021-01-01 RX ADMIN — PANTOPRAZOLE SODIUM 40 MILLIGRAM(S): 20 TABLET, DELAYED RELEASE ORAL at 12:45

## 2021-01-01 RX ADMIN — PANTOPRAZOLE SODIUM 40 MILLIGRAM(S): 20 TABLET, DELAYED RELEASE ORAL at 05:31

## 2021-01-01 RX ADMIN — Medication 2 TABLET(S): at 05:53

## 2021-01-01 RX ADMIN — Medication 20 MILLIGRAM(S): at 22:17

## 2021-01-01 RX ADMIN — PIPERACILLIN AND TAZOBACTAM 25 GRAM(S): 4; .5 INJECTION, POWDER, LYOPHILIZED, FOR SOLUTION INTRAVENOUS at 21:32

## 2021-01-01 RX ADMIN — POTASSIUM PHOSPHATE, MONOBASIC POTASSIUM PHOSPHATE, DIBASIC 62.5 MILLIMOLE(S): 236; 224 INJECTION, SOLUTION INTRAVENOUS at 10:19

## 2021-01-01 RX ADMIN — SODIUM CHLORIDE 100 MILLILITER(S): 9 INJECTION, SOLUTION INTRAVENOUS at 06:38

## 2021-01-01 RX ADMIN — Medication 5 MILLIGRAM(S): at 06:20

## 2021-01-01 RX ADMIN — HEPARIN SODIUM 5000 UNIT(S): 5000 INJECTION INTRAVENOUS; SUBCUTANEOUS at 05:31

## 2021-01-01 RX ADMIN — AMLODIPINE BESYLATE 2.5 MILLIGRAM(S): 2.5 TABLET ORAL at 06:36

## 2021-01-01 RX ADMIN — AMPICILLIN SODIUM AND SULBACTAM SODIUM 200 GRAM(S): 250; 125 INJECTION, POWDER, FOR SUSPENSION INTRAMUSCULAR; INTRAVENOUS at 00:30

## 2021-01-01 RX ADMIN — Medication 100 MILLIEQUIVALENT(S): at 10:38

## 2021-01-01 RX ADMIN — Medication 5 MILLIGRAM(S): at 23:38

## 2021-01-01 RX ADMIN — Medication 1 TABLET(S): at 05:20

## 2021-01-01 RX ADMIN — POTASSIUM PHOSPHATE, MONOBASIC POTASSIUM PHOSPHATE, DIBASIC 83.33 MILLIMOLE(S): 236; 224 INJECTION, SOLUTION INTRAVENOUS at 08:57

## 2021-01-01 RX ADMIN — PIPERACILLIN AND TAZOBACTAM 25 GRAM(S): 4; .5 INJECTION, POWDER, LYOPHILIZED, FOR SOLUTION INTRAVENOUS at 21:34

## 2021-01-01 RX ADMIN — HYDROMORPHONE HYDROCHLORIDE 0.25 MILLIGRAM(S): 2 INJECTION INTRAMUSCULAR; INTRAVENOUS; SUBCUTANEOUS at 15:45

## 2021-01-01 RX ADMIN — Medication 1 TABLET(S): at 21:28

## 2021-01-01 RX ADMIN — Medication 2 TABLET(S): at 18:22

## 2021-01-01 RX ADMIN — SODIUM CHLORIDE 150 MILLILITER(S): 9 INJECTION, SOLUTION INTRAVENOUS at 05:27

## 2021-01-01 RX ADMIN — SODIUM CHLORIDE 100 MILLILITER(S): 9 INJECTION, SOLUTION INTRAVENOUS at 18:02

## 2021-01-01 RX ADMIN — SODIUM CHLORIDE 1 GRAM(S): 9 INJECTION INTRAMUSCULAR; INTRAVENOUS; SUBCUTANEOUS at 13:40

## 2021-01-01 RX ADMIN — SODIUM CHLORIDE 1 GRAM(S): 9 INJECTION INTRAMUSCULAR; INTRAVENOUS; SUBCUTANEOUS at 22:13

## 2021-01-01 RX ADMIN — Medication 20 MILLIGRAM(S): at 17:30

## 2021-01-01 RX ADMIN — POTASSIUM PHOSPHATE, MONOBASIC POTASSIUM PHOSPHATE, DIBASIC 166.67 MILLIMOLE(S): 236; 224 INJECTION, SOLUTION INTRAVENOUS at 02:05

## 2021-01-01 RX ADMIN — POTASSIUM PHOSPHATE, MONOBASIC POTASSIUM PHOSPHATE, DIBASIC 83.33 MILLIMOLE(S): 236; 224 INJECTION, SOLUTION INTRAVENOUS at 15:41

## 2021-01-01 RX ADMIN — PIPERACILLIN AND TAZOBACTAM 25 GRAM(S): 4; .5 INJECTION, POWDER, LYOPHILIZED, FOR SOLUTION INTRAVENOUS at 00:02

## 2021-01-01 RX ADMIN — Medication 0.1 MILLIGRAM(S): at 17:02

## 2021-01-01 RX ADMIN — Medication 500 MILLIGRAM(S): at 06:31

## 2021-01-01 RX ADMIN — Medication 50 GRAM(S): at 04:59

## 2021-01-01 RX ADMIN — HEPARIN SODIUM 5000 UNIT(S): 5000 INJECTION INTRAVENOUS; SUBCUTANEOUS at 21:52

## 2021-01-01 RX ADMIN — Medication 50 GRAM(S): at 18:36

## 2021-01-01 RX ADMIN — PIPERACILLIN AND TAZOBACTAM 25 GRAM(S): 4; .5 INJECTION, POWDER, LYOPHILIZED, FOR SOLUTION INTRAVENOUS at 15:29

## 2021-01-01 RX ADMIN — Medication 0.1 MILLIGRAM(S): at 17:28

## 2021-01-01 RX ADMIN — PAMIDRONATE DISODIUM 64.17 MILLIGRAM(S): 9 INJECTION, SOLUTION INTRAVENOUS at 14:12

## 2021-01-01 RX ADMIN — Medication 1000 MILLIGRAM(S): at 00:16

## 2021-01-01 RX ADMIN — PIPERACILLIN AND TAZOBACTAM 25 GRAM(S): 4; .5 INJECTION, POWDER, LYOPHILIZED, FOR SOLUTION INTRAVENOUS at 13:41

## 2021-01-01 RX ADMIN — Medication 20 MILLIEQUIVALENT(S): at 21:52

## 2021-01-01 RX ADMIN — PIPERACILLIN AND TAZOBACTAM 25 GRAM(S): 4; .5 INJECTION, POWDER, LYOPHILIZED, FOR SOLUTION INTRAVENOUS at 22:02

## 2021-01-01 RX ADMIN — Medication 0.1 MILLIGRAM(S): at 21:21

## 2021-01-01 RX ADMIN — HYDROMORPHONE HYDROCHLORIDE 30 MILLILITER(S): 2 INJECTION INTRAMUSCULAR; INTRAVENOUS; SUBCUTANEOUS at 19:48

## 2021-01-01 RX ADMIN — ENOXAPARIN SODIUM 40 MILLIGRAM(S): 100 INJECTION SUBCUTANEOUS at 12:22

## 2021-01-01 RX ADMIN — Medication 0.1 MILLIGRAM(S): at 13:19

## 2021-01-01 RX ADMIN — SODIUM CHLORIDE 1 GRAM(S): 9 INJECTION INTRAMUSCULAR; INTRAVENOUS; SUBCUTANEOUS at 13:14

## 2021-01-01 RX ADMIN — Medication 0.1 MILLIGRAM(S): at 17:21

## 2021-01-01 RX ADMIN — SODIUM CHLORIDE 2000 MILLILITER(S): 9 INJECTION, SOLUTION INTRAVENOUS at 10:30

## 2021-01-01 RX ADMIN — HYDROMORPHONE HYDROCHLORIDE 30 MILLILITER(S): 2 INJECTION INTRAMUSCULAR; INTRAVENOUS; SUBCUTANEOUS at 07:40

## 2021-01-01 RX ADMIN — HYDROMORPHONE HYDROCHLORIDE 0.25 MILLIGRAM(S): 2 INJECTION INTRAMUSCULAR; INTRAVENOUS; SUBCUTANEOUS at 19:47

## 2021-01-01 RX ADMIN — POTASSIUM PHOSPHATE, MONOBASIC POTASSIUM PHOSPHATE, DIBASIC 83.33 MILLIMOLE(S): 236; 224 INJECTION, SOLUTION INTRAVENOUS at 22:03

## 2021-01-01 RX ADMIN — PIPERACILLIN AND TAZOBACTAM 25 GRAM(S): 4; .5 INJECTION, POWDER, LYOPHILIZED, FOR SOLUTION INTRAVENOUS at 07:49

## 2021-01-01 RX ADMIN — Medication 40 MILLIEQUIVALENT(S): at 12:30

## 2021-01-01 RX ADMIN — HEPARIN SODIUM 5000 UNIT(S): 5000 INJECTION INTRAVENOUS; SUBCUTANEOUS at 13:34

## 2021-01-01 RX ADMIN — CHLORHEXIDINE GLUCONATE 1 APPLICATION(S): 213 SOLUTION TOPICAL at 06:08

## 2021-01-01 RX ADMIN — SODIUM CHLORIDE 2 GRAM(S): 9 INJECTION INTRAMUSCULAR; INTRAVENOUS; SUBCUTANEOUS at 23:08

## 2021-01-01 RX ADMIN — PIPERACILLIN AND TAZOBACTAM 25 GRAM(S): 4; .5 INJECTION, POWDER, LYOPHILIZED, FOR SOLUTION INTRAVENOUS at 12:21

## 2021-01-01 RX ADMIN — Medication 5 MILLIGRAM(S): at 14:12

## 2021-01-01 RX ADMIN — AMPICILLIN SODIUM AND SULBACTAM SODIUM 200 GRAM(S): 250; 125 INJECTION, POWDER, FOR SUSPENSION INTRAMUSCULAR; INTRAVENOUS at 12:40

## 2021-01-01 RX ADMIN — PIPERACILLIN AND TAZOBACTAM 25 GRAM(S): 4; .5 INJECTION, POWDER, LYOPHILIZED, FOR SOLUTION INTRAVENOUS at 23:10

## 2021-01-01 RX ADMIN — Medication 2 TABLET(S): at 23:59

## 2021-01-01 RX ADMIN — IOHEXOL 30 MILLILITER(S): 300 INJECTION, SOLUTION INTRAVENOUS at 11:25

## 2021-01-01 RX ADMIN — SODIUM CHLORIDE 2 GRAM(S): 9 INJECTION INTRAMUSCULAR; INTRAVENOUS; SUBCUTANEOUS at 21:33

## 2021-01-01 RX ADMIN — Medication 5 MILLIGRAM(S): at 13:57

## 2021-01-01 RX ADMIN — POTASSIUM PHOSPHATE, MONOBASIC POTASSIUM PHOSPHATE, DIBASIC 62.5 MILLIMOLE(S): 236; 224 INJECTION, SOLUTION INTRAVENOUS at 17:27

## 2021-01-01 RX ADMIN — Medication 2 TABLET(S): at 12:39

## 2021-01-01 RX ADMIN — PIPERACILLIN AND TAZOBACTAM 25 GRAM(S): 4; .5 INJECTION, POWDER, LYOPHILIZED, FOR SOLUTION INTRAVENOUS at 05:23

## 2021-01-01 RX ADMIN — SODIUM CHLORIDE 100 MILLILITER(S): 9 INJECTION, SOLUTION INTRAVENOUS at 18:15

## 2021-01-01 RX ADMIN — Medication 100 MILLIEQUIVALENT(S): at 23:36

## 2021-01-01 RX ADMIN — Medication 100 MILLIEQUIVALENT(S): at 06:29

## 2021-01-01 RX ADMIN — HEPARIN SODIUM 5000 UNIT(S): 5000 INJECTION INTRAVENOUS; SUBCUTANEOUS at 06:38

## 2021-01-01 RX ADMIN — SODIUM CHLORIDE 1 GRAM(S): 9 INJECTION INTRAMUSCULAR; INTRAVENOUS; SUBCUTANEOUS at 05:28

## 2021-01-01 RX ADMIN — AMPICILLIN SODIUM AND SULBACTAM SODIUM 200 GRAM(S): 250; 125 INJECTION, POWDER, FOR SUSPENSION INTRAMUSCULAR; INTRAVENOUS at 06:14

## 2021-01-01 RX ADMIN — I.V. FAT EMULSION 8.3 ML/HR: 20 EMULSION INTRAVENOUS at 21:06

## 2021-01-01 RX ADMIN — PIPERACILLIN AND TAZOBACTAM 25 GRAM(S): 4; .5 INJECTION, POWDER, LYOPHILIZED, FOR SOLUTION INTRAVENOUS at 13:08

## 2021-01-01 RX ADMIN — CALCITONIN SALMON 290 INTERNATIONAL UNIT(S): 200 INJECTION, SOLUTION INTRAMUSCULAR at 09:44

## 2021-01-01 RX ADMIN — Medication 0.25 MILLIGRAM(S): at 20:30

## 2021-01-01 RX ADMIN — SODIUM CHLORIDE 1000 MILLILITER(S): 9 INJECTION INTRAMUSCULAR; INTRAVENOUS; SUBCUTANEOUS at 03:32

## 2021-01-01 RX ADMIN — Medication 100 MILLIEQUIVALENT(S): at 10:44

## 2021-01-01 RX ADMIN — Medication 5 MILLIGRAM(S): at 00:07

## 2021-01-01 RX ADMIN — Medication 100 MILLIEQUIVALENT(S): at 02:30

## 2021-01-01 RX ADMIN — FLUCONAZOLE 100 MILLIGRAM(S): 150 TABLET ORAL at 11:12

## 2021-01-01 RX ADMIN — Medication 100 MILLIEQUIVALENT(S): at 20:44

## 2021-01-01 RX ADMIN — DEXTROSE MONOHYDRATE, SODIUM CHLORIDE, AND POTASSIUM CHLORIDE 84 MILLILITER(S): 50; .745; 4.5 INJECTION, SOLUTION INTRAVENOUS at 19:07

## 2021-01-01 RX ADMIN — Medication 0.1 MILLIGRAM(S): at 05:45

## 2021-01-01 RX ADMIN — SODIUM CHLORIDE 75 MILLILITER(S): 9 INJECTION, SOLUTION INTRAVENOUS at 14:54

## 2021-01-01 RX ADMIN — SODIUM CHLORIDE 100 MILLILITER(S): 9 INJECTION, SOLUTION INTRAVENOUS at 23:05

## 2021-01-01 RX ADMIN — PIPERACILLIN AND TAZOBACTAM 200 GRAM(S): 4; .5 INJECTION, POWDER, LYOPHILIZED, FOR SOLUTION INTRAVENOUS at 18:02

## 2021-01-01 RX ADMIN — POTASSIUM PHOSPHATE, MONOBASIC POTASSIUM PHOSPHATE, DIBASIC 83.33 MILLIMOLE(S): 236; 224 INJECTION, SOLUTION INTRAVENOUS at 21:52

## 2021-01-01 RX ADMIN — Medication 5 MILLIGRAM(S): at 13:39

## 2021-01-01 RX ADMIN — SODIUM CHLORIDE 100 MILLILITER(S): 9 INJECTION INTRAMUSCULAR; INTRAVENOUS; SUBCUTANEOUS at 10:44

## 2021-01-01 RX ADMIN — Medication 100 MILLIEQUIVALENT(S): at 18:00

## 2021-01-01 RX ADMIN — Medication 20 MILLIGRAM(S): at 11:00

## 2021-01-01 RX ADMIN — FLUCONAZOLE 100 MILLIGRAM(S): 150 TABLET ORAL at 10:19

## 2021-01-01 RX ADMIN — Medication 400 MILLIGRAM(S): at 23:09

## 2021-01-01 RX ADMIN — SODIUM CHLORIDE 100 MILLILITER(S): 9 INJECTION INTRAMUSCULAR; INTRAVENOUS; SUBCUTANEOUS at 22:23

## 2021-01-01 RX ADMIN — DEXTROSE MONOHYDRATE, SODIUM CHLORIDE, AND POTASSIUM CHLORIDE 100 MILLILITER(S): 50; .745; 4.5 INJECTION, SOLUTION INTRAVENOUS at 23:53

## 2021-01-01 RX ADMIN — SODIUM CHLORIDE 100 MILLILITER(S): 9 INJECTION, SOLUTION INTRAVENOUS at 23:14

## 2021-01-01 RX ADMIN — Medication 2 TABLET(S): at 08:03

## 2021-01-01 RX ADMIN — PIPERACILLIN AND TAZOBACTAM 25 GRAM(S): 4; .5 INJECTION, POWDER, LYOPHILIZED, FOR SOLUTION INTRAVENOUS at 21:21

## 2021-01-01 RX ADMIN — Medication 5 MILLIGRAM(S): at 05:30

## 2021-01-01 RX ADMIN — DEXTROSE MONOHYDRATE, SODIUM CHLORIDE, AND POTASSIUM CHLORIDE 100 MILLILITER(S): 50; .745; 4.5 INJECTION, SOLUTION INTRAVENOUS at 19:33

## 2021-01-01 RX ADMIN — PIPERACILLIN AND TAZOBACTAM 25 GRAM(S): 4; .5 INJECTION, POWDER, LYOPHILIZED, FOR SOLUTION INTRAVENOUS at 06:02

## 2021-01-01 RX ADMIN — Medication 0.1 MILLIGRAM(S): at 17:23

## 2021-01-01 RX ADMIN — ENOXAPARIN SODIUM 40 MILLIGRAM(S): 100 INJECTION SUBCUTANEOUS at 12:30

## 2021-01-01 RX ADMIN — Medication 0.1 MILLIGRAM(S): at 18:12

## 2021-01-01 RX ADMIN — HEPARIN SODIUM 5000 UNIT(S): 5000 INJECTION INTRAVENOUS; SUBCUTANEOUS at 05:56

## 2021-01-01 RX ADMIN — Medication 0.1 MILLIGRAM(S): at 05:24

## 2021-01-01 RX ADMIN — Medication 20 MILLIGRAM(S): at 21:04

## 2021-01-01 RX ADMIN — Medication 200 MILLIGRAM(S): at 05:49

## 2021-01-01 RX ADMIN — Medication 0.1 MILLIGRAM(S): at 05:53

## 2021-01-01 RX ADMIN — SODIUM CHLORIDE 100 MILLILITER(S): 9 INJECTION INTRAMUSCULAR; INTRAVENOUS; SUBCUTANEOUS at 16:29

## 2021-01-01 RX ADMIN — POTASSIUM PHOSPHATE, MONOBASIC POTASSIUM PHOSPHATE, DIBASIC 62.5 MILLIMOLE(S): 236; 224 INJECTION, SOLUTION INTRAVENOUS at 11:33

## 2021-01-01 RX ADMIN — Medication 5 MILLIGRAM(S): at 12:07

## 2021-01-01 RX ADMIN — AMPICILLIN SODIUM AND SULBACTAM SODIUM 200 GRAM(S): 250; 125 INJECTION, POWDER, FOR SUSPENSION INTRAMUSCULAR; INTRAVENOUS at 20:43

## 2021-01-01 RX ADMIN — ONDANSETRON 4 MILLIGRAM(S): 8 TABLET, FILM COATED ORAL at 22:17

## 2021-01-01 RX ADMIN — Medication 500 MILLIGRAM(S): at 17:30

## 2021-01-01 RX ADMIN — FLUCONAZOLE 100 MILLIGRAM(S): 150 TABLET ORAL at 12:35

## 2021-01-01 RX ADMIN — HYDROMORPHONE HYDROCHLORIDE 30 MILLILITER(S): 2 INJECTION INTRAMUSCULAR; INTRAVENOUS; SUBCUTANEOUS at 19:21

## 2021-01-01 RX ADMIN — ENOXAPARIN SODIUM 40 MILLIGRAM(S): 100 INJECTION SUBCUTANEOUS at 12:39

## 2021-01-01 RX ADMIN — SODIUM CHLORIDE 100 MILLILITER(S): 9 INJECTION, SOLUTION INTRAVENOUS at 00:30

## 2021-01-01 RX ADMIN — ONDANSETRON 4 MILLIGRAM(S): 8 TABLET, FILM COATED ORAL at 23:09

## 2021-01-01 RX ADMIN — HEPARIN SODIUM 5000 UNIT(S): 5000 INJECTION INTRAVENOUS; SUBCUTANEOUS at 06:26

## 2021-01-01 RX ADMIN — SODIUM CHLORIDE 1 GRAM(S): 9 INJECTION INTRAMUSCULAR; INTRAVENOUS; SUBCUTANEOUS at 06:16

## 2021-01-01 RX ADMIN — CALCITONIN SALMON 290 INTERNATIONAL UNIT(S): 200 INJECTION, SOLUTION INTRAMUSCULAR at 20:45

## 2021-01-01 RX ADMIN — Medication 0.1 MILLIGRAM(S): at 06:29

## 2021-01-01 RX ADMIN — Medication 5 MILLIGRAM(S): at 18:03

## 2021-01-01 RX ADMIN — Medication 2 TABLET(S): at 21:34

## 2021-01-01 RX ADMIN — Medication 50 GRAM(S): at 10:19

## 2021-01-01 RX ADMIN — I.V. FAT EMULSION 15.8 ML/HR: 20 EMULSION INTRAVENOUS at 17:06

## 2021-01-01 RX ADMIN — Medication 2 TABLET(S): at 11:38

## 2021-01-01 RX ADMIN — AMPICILLIN SODIUM AND SULBACTAM SODIUM 200 GRAM(S): 250; 125 INJECTION, POWDER, FOR SUSPENSION INTRAMUSCULAR; INTRAVENOUS at 18:11

## 2021-01-01 RX ADMIN — Medication 50 GRAM(S): at 07:46

## 2021-01-01 RX ADMIN — CALCITONIN SALMON 290 INTERNATIONAL UNIT(S): 200 INJECTION, SOLUTION INTRAMUSCULAR at 21:22

## 2021-01-01 RX ADMIN — SODIUM CHLORIDE 1000 MILLILITER(S): 9 INJECTION INTRAMUSCULAR; INTRAVENOUS; SUBCUTANEOUS at 22:17

## 2021-01-01 RX ADMIN — Medication 5 MILLIGRAM(S): at 06:25

## 2021-01-01 RX ADMIN — AMPICILLIN SODIUM AND SULBACTAM SODIUM 200 GRAM(S): 250; 125 INJECTION, POWDER, FOR SUSPENSION INTRAMUSCULAR; INTRAVENOUS at 18:20

## 2021-01-01 RX ADMIN — Medication 20 MILLIEQUIVALENT(S): at 18:47

## 2021-01-01 RX ADMIN — PANTOPRAZOLE SODIUM 40 MILLIGRAM(S): 20 TABLET, DELAYED RELEASE ORAL at 13:57

## 2021-01-01 RX ADMIN — HEPARIN SODIUM 5000 UNIT(S): 5000 INJECTION INTRAVENOUS; SUBCUTANEOUS at 00:07

## 2021-01-01 RX ADMIN — Medication 2 TABLET(S): at 22:01

## 2021-01-01 RX ADMIN — SODIUM CHLORIDE 1 GRAM(S): 9 INJECTION INTRAMUSCULAR; INTRAVENOUS; SUBCUTANEOUS at 08:13

## 2021-01-01 RX ADMIN — Medication 1 TABLET(S): at 05:24

## 2021-01-01 RX ADMIN — HYDROMORPHONE HYDROCHLORIDE 0.25 MILLIGRAM(S): 2 INJECTION INTRAMUSCULAR; INTRAVENOUS; SUBCUTANEOUS at 20:00

## 2021-01-01 RX ADMIN — SODIUM CHLORIDE 125 MILLILITER(S): 9 INJECTION INTRAMUSCULAR; INTRAVENOUS; SUBCUTANEOUS at 05:10

## 2021-01-01 RX ADMIN — HEPARIN SODIUM 5000 UNIT(S): 5000 INJECTION INTRAVENOUS; SUBCUTANEOUS at 22:31

## 2021-01-01 RX ADMIN — Medication 2 TABLET(S): at 15:07

## 2021-01-01 RX ADMIN — PIPERACILLIN AND TAZOBACTAM 200 GRAM(S): 4; .5 INJECTION, POWDER, LYOPHILIZED, FOR SOLUTION INTRAVENOUS at 17:39

## 2021-01-01 RX ADMIN — SODIUM CHLORIDE 1 GRAM(S): 9 INJECTION INTRAMUSCULAR; INTRAVENOUS; SUBCUTANEOUS at 05:27

## 2021-01-01 RX ADMIN — Medication 2 TABLET(S): at 17:40

## 2021-01-01 RX ADMIN — HYDROMORPHONE HYDROCHLORIDE 30 MILLILITER(S): 2 INJECTION INTRAMUSCULAR; INTRAVENOUS; SUBCUTANEOUS at 07:42

## 2021-01-01 RX ADMIN — HYDROMORPHONE HYDROCHLORIDE 30 MILLILITER(S): 2 INJECTION INTRAMUSCULAR; INTRAVENOUS; SUBCUTANEOUS at 06:20

## 2021-01-01 RX ADMIN — CHLORHEXIDINE GLUCONATE 1 APPLICATION(S): 213 SOLUTION TOPICAL at 21:06

## 2021-01-01 RX ADMIN — PANTOPRAZOLE SODIUM 40 MILLIGRAM(S): 20 TABLET, DELAYED RELEASE ORAL at 12:05

## 2021-01-01 RX ADMIN — Medication 5 MILLIGRAM(S): at 17:30

## 2021-01-01 RX ADMIN — PIPERACILLIN AND TAZOBACTAM 25 GRAM(S): 4; .5 INJECTION, POWDER, LYOPHILIZED, FOR SOLUTION INTRAVENOUS at 05:46

## 2021-01-01 RX ADMIN — Medication 100 GRAM(S): at 12:29

## 2021-01-01 RX ADMIN — PIPERACILLIN AND TAZOBACTAM 25 GRAM(S): 4; .5 INJECTION, POWDER, LYOPHILIZED, FOR SOLUTION INTRAVENOUS at 05:24

## 2021-01-01 RX ADMIN — PANTOPRAZOLE SODIUM 40 MILLIGRAM(S): 20 TABLET, DELAYED RELEASE ORAL at 03:47

## 2021-01-01 RX ADMIN — CHLORHEXIDINE GLUCONATE 1 APPLICATION(S): 213 SOLUTION TOPICAL at 05:23

## 2021-01-01 RX ADMIN — Medication 63.75 MILLIMOLE(S): at 09:41

## 2021-01-01 RX ADMIN — Medication 100 MILLIEQUIVALENT(S): at 07:00

## 2021-01-01 RX ADMIN — AMPICILLIN SODIUM AND SULBACTAM SODIUM 200 GRAM(S): 250; 125 INJECTION, POWDER, FOR SUSPENSION INTRAMUSCULAR; INTRAVENOUS at 17:23

## 2021-01-01 RX ADMIN — ENOXAPARIN SODIUM 40 MILLIGRAM(S): 100 INJECTION SUBCUTANEOUS at 17:41

## 2021-01-01 RX ADMIN — SODIUM CHLORIDE 1 GRAM(S): 9 INJECTION INTRAMUSCULAR; INTRAVENOUS; SUBCUTANEOUS at 13:09

## 2021-01-01 RX ADMIN — Medication 0.1 MILLIGRAM(S): at 05:31

## 2021-01-01 RX ADMIN — Medication 0.1 MILLIGRAM(S): at 08:40

## 2021-01-01 RX ADMIN — Medication 100 MILLIEQUIVALENT(S): at 08:42

## 2021-01-01 RX ADMIN — Medication 2 TABLET(S): at 11:35

## 2021-01-01 RX ADMIN — SODIUM CHLORIDE 1 GRAM(S): 9 INJECTION INTRAMUSCULAR; INTRAVENOUS; SUBCUTANEOUS at 05:24

## 2021-01-01 RX ADMIN — POTASSIUM PHOSPHATE, MONOBASIC POTASSIUM PHOSPHATE, DIBASIC 83.33 MILLIMOLE(S): 236; 224 INJECTION, SOLUTION INTRAVENOUS at 09:23

## 2021-01-01 RX ADMIN — SODIUM CHLORIDE 150 MILLILITER(S): 9 INJECTION, SOLUTION INTRAVENOUS at 15:00

## 2021-01-01 RX ADMIN — Medication 20 MILLIEQUIVALENT(S): at 12:48

## 2021-01-01 RX ADMIN — Medication 100 MILLIEQUIVALENT(S): at 09:44

## 2021-01-01 RX ADMIN — Medication 40 MILLIEQUIVALENT(S): at 13:19

## 2021-01-01 RX ADMIN — Medication 0.1 MILLIGRAM(S): at 18:21

## 2021-01-01 RX ADMIN — FLUCONAZOLE 400 MILLIGRAM(S): 150 TABLET ORAL at 12:23

## 2021-01-01 RX ADMIN — PIPERACILLIN AND TAZOBACTAM 25 GRAM(S): 4; .5 INJECTION, POWDER, LYOPHILIZED, FOR SOLUTION INTRAVENOUS at 21:23

## 2021-01-01 RX ADMIN — Medication 5 MILLIGRAM(S): at 09:23

## 2021-01-01 RX ADMIN — AMPICILLIN SODIUM AND SULBACTAM SODIUM 200 GRAM(S): 250; 125 INJECTION, POWDER, FOR SUSPENSION INTRAMUSCULAR; INTRAVENOUS at 11:36

## 2021-01-01 RX ADMIN — AMPICILLIN SODIUM AND SULBACTAM SODIUM 200 GRAM(S): 250; 125 INJECTION, POWDER, FOR SUSPENSION INTRAMUSCULAR; INTRAVENOUS at 05:54

## 2021-01-01 RX ADMIN — Medication 5 MILLIGRAM(S): at 10:57

## 2021-01-01 RX ADMIN — Medication 200 MILLIGRAM(S): at 17:30

## 2021-01-01 RX ADMIN — Medication 0.1 MILLIGRAM(S): at 06:03

## 2021-01-01 RX ADMIN — Medication 5 MILLIGRAM(S): at 06:16

## 2021-01-01 RX ADMIN — Medication 100 MILLIEQUIVALENT(S): at 17:41

## 2021-01-01 RX ADMIN — SODIUM CHLORIDE 2 GRAM(S): 9 INJECTION INTRAMUSCULAR; INTRAVENOUS; SUBCUTANEOUS at 05:45

## 2021-01-01 RX ADMIN — Medication 2 TABLET(S): at 08:53

## 2021-01-01 RX ADMIN — SODIUM CHLORIDE 1 GRAM(S): 9 INJECTION INTRAMUSCULAR; INTRAVENOUS; SUBCUTANEOUS at 13:13

## 2021-01-01 RX ADMIN — Medication 102 MILLIGRAM(S): at 10:12

## 2021-01-01 RX ADMIN — MORPHINE SULFATE 0.5 MG/HR: 50 CAPSULE, EXTENDED RELEASE ORAL at 21:32

## 2021-01-01 RX ADMIN — SODIUM CHLORIDE 1 GRAM(S): 9 INJECTION INTRAMUSCULAR; INTRAVENOUS; SUBCUTANEOUS at 22:03

## 2021-01-01 RX ADMIN — Medication 50 GRAM(S): at 23:14

## 2021-01-01 RX ADMIN — ONDANSETRON 4 MILLIGRAM(S): 8 TABLET, FILM COATED ORAL at 15:56

## 2021-01-01 RX ADMIN — SODIUM CHLORIDE 500 MILLILITER(S): 9 INJECTION INTRAMUSCULAR; INTRAVENOUS; SUBCUTANEOUS at 18:00

## 2021-01-01 RX ADMIN — SODIUM CHLORIDE 1 GRAM(S): 9 INJECTION INTRAMUSCULAR; INTRAVENOUS; SUBCUTANEOUS at 06:03

## 2021-01-01 RX ADMIN — Medication 5 MILLIGRAM(S): at 05:56

## 2021-01-01 RX ADMIN — Medication 100 GRAM(S): at 13:12

## 2021-01-01 RX ADMIN — Medication 0.1 MILLIGRAM(S): at 06:20

## 2021-01-01 RX ADMIN — HEPARIN SODIUM 5000 UNIT(S): 5000 INJECTION INTRAVENOUS; SUBCUTANEOUS at 05:50

## 2021-01-01 RX ADMIN — SODIUM CHLORIDE 1 GRAM(S): 9 INJECTION INTRAMUSCULAR; INTRAVENOUS; SUBCUTANEOUS at 14:16

## 2021-01-01 RX ADMIN — PIPERACILLIN AND TAZOBACTAM 25 GRAM(S): 4; .5 INJECTION, POWDER, LYOPHILIZED, FOR SOLUTION INTRAVENOUS at 06:08

## 2021-01-01 RX ADMIN — Medication 10 MILLIGRAM(S): at 14:16

## 2021-01-01 RX ADMIN — SODIUM CHLORIDE 100 MILLILITER(S): 9 INJECTION, SOLUTION INTRAVENOUS at 16:16

## 2021-01-01 RX ADMIN — HEPARIN SODIUM 5000 UNIT(S): 5000 INJECTION INTRAVENOUS; SUBCUTANEOUS at 07:49

## 2021-01-01 RX ADMIN — PIPERACILLIN AND TAZOBACTAM 25 GRAM(S): 4; .5 INJECTION, POWDER, LYOPHILIZED, FOR SOLUTION INTRAVENOUS at 06:38

## 2021-01-01 RX ADMIN — Medication 1 EACH: at 17:07

## 2021-01-01 RX ADMIN — HEPARIN SODIUM 5000 UNIT(S): 5000 INJECTION INTRAVENOUS; SUBCUTANEOUS at 15:17

## 2021-01-01 RX ADMIN — Medication 42 EACH: at 21:04

## 2021-01-01 RX ADMIN — SODIUM CHLORIDE 1 GRAM(S): 9 INJECTION INTRAMUSCULAR; INTRAVENOUS; SUBCUTANEOUS at 21:28

## 2021-01-01 RX ADMIN — Medication 100 MILLIEQUIVALENT(S): at 01:41

## 2021-01-01 RX ADMIN — ENOXAPARIN SODIUM 40 MILLIGRAM(S): 100 INJECTION SUBCUTANEOUS at 11:35

## 2021-01-01 RX ADMIN — ENOXAPARIN SODIUM 40 MILLIGRAM(S): 100 INJECTION SUBCUTANEOUS at 11:12

## 2021-01-01 RX ADMIN — POTASSIUM PHOSPHATE, MONOBASIC POTASSIUM PHOSPHATE, DIBASIC 83.33 MILLIMOLE(S): 236; 224 INJECTION, SOLUTION INTRAVENOUS at 02:40

## 2021-01-01 RX ADMIN — PIPERACILLIN AND TAZOBACTAM 25 GRAM(S): 4; .5 INJECTION, POWDER, LYOPHILIZED, FOR SOLUTION INTRAVENOUS at 05:57

## 2021-01-01 RX ADMIN — PIPERACILLIN AND TAZOBACTAM 25 GRAM(S): 4; .5 INJECTION, POWDER, LYOPHILIZED, FOR SOLUTION INTRAVENOUS at 08:12

## 2021-01-01 RX ADMIN — PANTOPRAZOLE SODIUM 40 MILLIGRAM(S): 20 TABLET, DELAYED RELEASE ORAL at 13:34

## 2021-01-01 RX ADMIN — SODIUM CHLORIDE 125 MILLILITER(S): 9 INJECTION, SOLUTION INTRAVENOUS at 15:46

## 2021-01-01 RX ADMIN — Medication 100 MILLIEQUIVALENT(S): at 13:25

## 2021-01-01 RX ADMIN — PANTOPRAZOLE SODIUM 40 MILLIGRAM(S): 20 TABLET, DELAYED RELEASE ORAL at 10:57

## 2021-01-01 RX ADMIN — SODIUM CHLORIDE 50 MILLILITER(S): 9 INJECTION, SOLUTION INTRAVENOUS at 22:01

## 2021-01-01 RX ADMIN — Medication 10 MILLIGRAM(S): at 11:37

## 2021-01-01 RX ADMIN — ENOXAPARIN SODIUM 40 MILLIGRAM(S): 100 INJECTION SUBCUTANEOUS at 12:54

## 2021-01-01 RX ADMIN — AMPICILLIN SODIUM AND SULBACTAM SODIUM 200 GRAM(S): 250; 125 INJECTION, POWDER, FOR SUSPENSION INTRAMUSCULAR; INTRAVENOUS at 06:10

## 2021-01-01 RX ADMIN — Medication 100 MILLIEQUIVALENT(S): at 21:03

## 2021-01-01 RX ADMIN — Medication 200 MILLIGRAM(S): at 06:26

## 2021-01-01 RX ADMIN — POTASSIUM PHOSPHATE, MONOBASIC POTASSIUM PHOSPHATE, DIBASIC 83.33 MILLIMOLE(S): 236; 224 INJECTION, SOLUTION INTRAVENOUS at 19:16

## 2021-01-01 RX ADMIN — Medication 5 MILLIGRAM(S): at 07:49

## 2021-01-01 RX ADMIN — PIPERACILLIN AND TAZOBACTAM 25 GRAM(S): 4; .5 INJECTION, POWDER, LYOPHILIZED, FOR SOLUTION INTRAVENOUS at 22:01

## 2021-01-01 RX ADMIN — PIPERACILLIN AND TAZOBACTAM 25 GRAM(S): 4; .5 INJECTION, POWDER, LYOPHILIZED, FOR SOLUTION INTRAVENOUS at 21:52

## 2021-01-01 RX ADMIN — DEXTROSE MONOHYDRATE, SODIUM CHLORIDE, AND POTASSIUM CHLORIDE 100 MILLILITER(S): 50; .745; 4.5 INJECTION, SOLUTION INTRAVENOUS at 09:23

## 2021-01-01 RX ADMIN — SODIUM CHLORIDE 100 MILLILITER(S): 9 INJECTION, SOLUTION INTRAVENOUS at 17:07

## 2021-01-01 RX ADMIN — POTASSIUM PHOSPHATE, MONOBASIC POTASSIUM PHOSPHATE, DIBASIC 62.5 MILLIMOLE(S): 236; 224 INJECTION, SOLUTION INTRAVENOUS at 12:10

## 2021-01-01 RX ADMIN — Medication 5 MILLIGRAM(S): at 17:32

## 2021-01-01 RX ADMIN — AMPICILLIN SODIUM AND SULBACTAM SODIUM 200 GRAM(S): 250; 125 INJECTION, POWDER, FOR SUSPENSION INTRAMUSCULAR; INTRAVENOUS at 18:40

## 2021-01-01 RX ADMIN — Medication 1000 MILLIGRAM(S): at 13:10

## 2021-01-01 RX ADMIN — HYDROMORPHONE HYDROCHLORIDE 30 MILLILITER(S): 2 INJECTION INTRAMUSCULAR; INTRAVENOUS; SUBCUTANEOUS at 22:30

## 2021-01-01 RX ADMIN — AMLODIPINE BESYLATE 2.5 MILLIGRAM(S): 2.5 TABLET ORAL at 20:53

## 2021-01-01 RX ADMIN — SODIUM CHLORIDE 50 MILLILITER(S): 9 INJECTION INTRAMUSCULAR; INTRAVENOUS; SUBCUTANEOUS at 09:52

## 2021-01-01 RX ADMIN — Medication 1000 MILLIGRAM(S): at 04:31

## 2021-01-01 RX ADMIN — Medication 200 MILLIGRAM(S): at 11:58

## 2021-01-01 RX ADMIN — Medication 10 MILLIGRAM(S): at 18:08

## 2021-01-01 RX ADMIN — SODIUM CHLORIDE 1 GRAM(S): 9 INJECTION INTRAMUSCULAR; INTRAVENOUS; SUBCUTANEOUS at 18:06

## 2021-01-01 RX ADMIN — HYDROMORPHONE HYDROCHLORIDE 30 MILLILITER(S): 2 INJECTION INTRAMUSCULAR; INTRAVENOUS; SUBCUTANEOUS at 19:22

## 2021-01-01 RX ADMIN — DEXTROSE MONOHYDRATE, SODIUM CHLORIDE, AND POTASSIUM CHLORIDE 42 MILLILITER(S): 50; .745; 4.5 INJECTION, SOLUTION INTRAVENOUS at 17:32

## 2021-01-01 RX ADMIN — Medication 2 TABLET(S): at 08:44

## 2021-01-01 RX ADMIN — Medication 10 MILLIGRAM(S): at 23:58

## 2021-01-01 RX ADMIN — Medication 50 GRAM(S): at 10:37

## 2021-01-01 RX ADMIN — SODIUM CHLORIDE 100 MILLILITER(S): 9 INJECTION, SOLUTION INTRAVENOUS at 19:43

## 2021-01-01 RX ADMIN — AMPICILLIN SODIUM AND SULBACTAM SODIUM 200 GRAM(S): 250; 125 INJECTION, POWDER, FOR SUSPENSION INTRAMUSCULAR; INTRAVENOUS at 15:12

## 2021-01-01 RX ADMIN — POTASSIUM PHOSPHATE, MONOBASIC POTASSIUM PHOSPHATE, DIBASIC 83.33 MILLIMOLE(S): 236; 224 INJECTION, SOLUTION INTRAVENOUS at 15:33

## 2021-01-01 RX ADMIN — AMPICILLIN SODIUM AND SULBACTAM SODIUM 200 GRAM(S): 250; 125 INJECTION, POWDER, FOR SUSPENSION INTRAMUSCULAR; INTRAVENOUS at 13:37

## 2021-01-01 RX ADMIN — Medication 5 MILLIGRAM(S): at 06:14

## 2021-01-01 RX ADMIN — CALCITONIN SALMON 290 INTERNATIONAL UNIT(S): 200 INJECTION, SOLUTION INTRAMUSCULAR at 05:28

## 2021-01-01 RX ADMIN — SODIUM CHLORIDE 150 MILLILITER(S): 9 INJECTION, SOLUTION INTRAVENOUS at 12:54

## 2021-01-01 RX ADMIN — POTASSIUM PHOSPHATE, MONOBASIC POTASSIUM PHOSPHATE, DIBASIC 62.5 MILLIMOLE(S): 236; 224 INJECTION, SOLUTION INTRAVENOUS at 04:02

## 2021-01-01 RX ADMIN — HEPARIN SODIUM 5000 UNIT(S): 5000 INJECTION INTRAVENOUS; SUBCUTANEOUS at 13:57

## 2021-01-01 RX ADMIN — Medication 2 PACKET(S): at 18:49

## 2021-01-01 RX ADMIN — HEPARIN SODIUM 5000 UNIT(S): 5000 INJECTION INTRAVENOUS; SUBCUTANEOUS at 21:41

## 2021-01-01 RX ADMIN — SODIUM CHLORIDE 1000 MILLILITER(S): 9 INJECTION, SOLUTION INTRAVENOUS at 21:08

## 2021-01-01 RX ADMIN — Medication 0.1 MILLIGRAM(S): at 17:40

## 2021-01-01 RX ADMIN — Medication 2 TABLET(S): at 13:36

## 2021-01-01 RX ADMIN — Medication 1 TABLET(S): at 13:08

## 2021-01-01 RX ADMIN — SODIUM CHLORIDE 100 MILLILITER(S): 9 INJECTION, SOLUTION INTRAVENOUS at 08:58

## 2021-01-01 RX ADMIN — AMPICILLIN SODIUM AND SULBACTAM SODIUM 200 GRAM(S): 250; 125 INJECTION, POWDER, FOR SUSPENSION INTRAMUSCULAR; INTRAVENOUS at 00:19

## 2021-01-01 RX ADMIN — SODIUM CHLORIDE 500 MILLILITER(S): 9 INJECTION INTRAMUSCULAR; INTRAVENOUS; SUBCUTANEOUS at 18:12

## 2021-01-01 RX ADMIN — Medication 5 MILLIGRAM(S): at 23:31

## 2021-01-01 RX ADMIN — PIPERACILLIN AND TAZOBACTAM 25 GRAM(S): 4; .5 INJECTION, POWDER, LYOPHILIZED, FOR SOLUTION INTRAVENOUS at 05:27

## 2021-01-01 RX ADMIN — HYDROMORPHONE HYDROCHLORIDE 0.5 MILLIGRAM(S): 2 INJECTION INTRAMUSCULAR; INTRAVENOUS; SUBCUTANEOUS at 03:30

## 2021-01-01 RX ADMIN — Medication 100 MILLIEQUIVALENT(S): at 09:23

## 2021-01-01 RX ADMIN — Medication 100 MILLIEQUIVALENT(S): at 04:13

## 2021-01-01 RX ADMIN — PIPERACILLIN AND TAZOBACTAM 25 GRAM(S): 4; .5 INJECTION, POWDER, LYOPHILIZED, FOR SOLUTION INTRAVENOUS at 13:14

## 2021-01-01 RX ADMIN — DEXTROSE MONOHYDRATE, SODIUM CHLORIDE, AND POTASSIUM CHLORIDE 100 MILLILITER(S): 50; .745; 4.5 INJECTION, SOLUTION INTRAVENOUS at 05:49

## 2021-01-01 RX ADMIN — DEXTROSE MONOHYDRATE, SODIUM CHLORIDE, AND POTASSIUM CHLORIDE 84 MILLILITER(S): 50; .745; 4.5 INJECTION, SOLUTION INTRAVENOUS at 12:06

## 2021-01-01 RX ADMIN — Medication 0.1 MILLIGRAM(S): at 05:27

## 2021-01-01 RX ADMIN — Medication 85 MILLIMOLE(S): at 06:29

## 2021-01-01 RX ADMIN — AMLODIPINE BESYLATE 5 MILLIGRAM(S): 2.5 TABLET ORAL at 12:14

## 2021-01-01 RX ADMIN — SODIUM CHLORIDE 2 GRAM(S): 9 INJECTION INTRAMUSCULAR; INTRAVENOUS; SUBCUTANEOUS at 14:04

## 2021-01-01 RX ADMIN — PIPERACILLIN AND TAZOBACTAM 25 GRAM(S): 4; .5 INJECTION, POWDER, LYOPHILIZED, FOR SOLUTION INTRAVENOUS at 12:15

## 2021-01-01 RX ADMIN — PIPERACILLIN AND TAZOBACTAM 25 GRAM(S): 4; .5 INJECTION, POWDER, LYOPHILIZED, FOR SOLUTION INTRAVENOUS at 12:23

## 2021-01-01 RX ADMIN — Medication 100 MILLIEQUIVALENT(S): at 03:31

## 2021-01-01 RX ADMIN — FLUCONAZOLE 400 MILLIGRAM(S): 150 TABLET ORAL at 12:07

## 2021-01-01 RX ADMIN — DEXTROSE MONOHYDRATE, SODIUM CHLORIDE, AND POTASSIUM CHLORIDE 84 MILLILITER(S): 50; .745; 4.5 INJECTION, SOLUTION INTRAVENOUS at 10:53

## 2021-01-01 RX ADMIN — Medication 0.1 MILLIGRAM(S): at 06:14

## 2021-01-01 RX ADMIN — Medication 100 MILLIEQUIVALENT(S): at 07:38

## 2021-01-01 RX ADMIN — Medication 100 MILLIEQUIVALENT(S): at 11:34

## 2021-01-01 RX ADMIN — Medication 10 MILLIGRAM(S): at 16:49

## 2021-01-01 RX ADMIN — HYDROMORPHONE HYDROCHLORIDE 0.5 MILLIGRAM(S): 2 INJECTION INTRAMUSCULAR; INTRAVENOUS; SUBCUTANEOUS at 02:49

## 2021-01-01 RX ADMIN — Medication 2 TABLET(S): at 18:11

## 2021-01-01 RX ADMIN — HYDROMORPHONE HYDROCHLORIDE 30 MILLILITER(S): 2 INJECTION INTRAMUSCULAR; INTRAVENOUS; SUBCUTANEOUS at 10:07

## 2021-01-01 RX ADMIN — SODIUM CHLORIDE 100 MILLILITER(S): 9 INJECTION, SOLUTION INTRAVENOUS at 10:27

## 2021-01-01 RX ADMIN — SODIUM CHLORIDE 100 MILLILITER(S): 9 INJECTION, SOLUTION INTRAVENOUS at 05:46

## 2021-01-01 RX ADMIN — Medication 5 MILLIGRAM(S): at 22:21

## 2021-01-01 RX ADMIN — PANTOPRAZOLE SODIUM 40 MILLIGRAM(S): 20 TABLET, DELAYED RELEASE ORAL at 11:33

## 2021-01-01 RX ADMIN — SODIUM CHLORIDE 2 GRAM(S): 9 INJECTION INTRAMUSCULAR; INTRAVENOUS; SUBCUTANEOUS at 15:29

## 2021-01-01 RX ADMIN — HYDROMORPHONE HYDROCHLORIDE 0.25 MILLIGRAM(S): 2 INJECTION INTRAMUSCULAR; INTRAVENOUS; SUBCUTANEOUS at 15:30

## 2021-01-01 RX ADMIN — Medication 200 MILLIGRAM(S): at 06:03

## 2021-01-01 RX ADMIN — Medication 400 MILLIGRAM(S): at 18:39

## 2021-01-01 RX ADMIN — Medication 1 TABLET(S): at 14:16

## 2021-01-01 RX ADMIN — SODIUM CHLORIDE 125 MILLILITER(S): 9 INJECTION, SOLUTION INTRAVENOUS at 02:40

## 2021-01-01 RX ADMIN — Medication 85 MILLIMOLE(S): at 09:16

## 2021-01-01 RX ADMIN — Medication 2 TABLET(S): at 17:23

## 2021-01-01 RX ADMIN — Medication 2 TABLET(S): at 22:13

## 2021-07-02 NOTE — CONSULT NOTE ADULT - ASSESSMENT
The patient with obstructive jaundice and noted to have pancreatic head mass with no vascular involvement.  Patient will be scheduled for EGD/EUS and ERCP tomorrow.  Please keep patient n.p.o. after midnight. The patient with obstructive jaundice and noted to have pancreatic head mass with no vascular involvement.  Patient will be scheduled for EGD/EUS and ERCP tomorrow.  Please keep patient n.p.o. after midnight. Surgical oncologist Dr Tj Johnson was called for the surgical oncology evaluation.

## 2021-07-02 NOTE — ED PROVIDER NOTE - PHYSICAL EXAMINATION
General: well appearing, NAD  Head: NC/AT, +scleral icterus  Cardiac: RRR, no m/r/g  Respiratory: CTABL, equal chest wall expansions, no conversational dyspnea  Abdomen: soft, ND, NT  Neuro: AAOx3,coordinated movement of all 4 extremities  Psych: calm, cooperative, normal affect  Skin: warm and dry, jaundiced

## 2021-07-02 NOTE — ED PROVIDER NOTE - OBJECTIVE STATEMENT
67yo M w/pmh h/o kidney stone presents for painless jaundice. [place]ed yesterday [place] [facility] [place], labs showed elevated LFTs, +bilirubin in urine, RUQ US showed cholilithiasis, did not visualize CBD. Was told he would be transferred to [facility] last night, left AMA to feed his cats, presented here in the morning. 67yo M w/pmh h/o kidney stone presents for painless jaundice. Denies f/ch, CP, SOB, abdominal pain, n/v, diarrhea, constipation. Reports green stool this AM. Presented yesterday to [facility] [place], labs showed elevated LFTs, +bilirubin in urine, RUQ US showed cholilithiasis w/o cholangitis, did not visualize CBD. Was told he would be transferred to [facility] last night, left AMA to feed his cats, presented here in the morning. Last saw a doctor years ago.

## 2021-07-02 NOTE — CONSULT NOTE ADULT - ASSESSMENT
65 y/o M w/ no other major problems presenting with dark urine and painless jaundice and evidence a pancreatic head mass w/ pancreatic ductal dilation and no obvious vascular invasion. Admitted to medicine for further workup.  -Plan for EUS w/ FNA by Dr. Fry for further workup of mass.  -Recommend chest CT for staging  -Recommend tumor markers - , CEA  -Will update recs based on further workup    Patient seen and evaluated with PGY-3 resident Adebayo Love.

## 2021-07-02 NOTE — H&P ADULT - HISTORY OF PRESENT ILLNESS
65 y/o M with PMH of kidney stone presenting for painless jaundice. The patient presented yesterday to [facility] [place] with complaints of dark urine and labs showed elevated LFTs. RUQ ultrasound was performed showing cholelithiasis without cholangitis however the CBD was not visualized. The patient was told that he would be transferred to [facility] last night however he left AMA prior to transfer. This morning the patient presented to [facility] with complaints of *** 67 y/o M with PMH of kidney stone presenting for painless jaundice. The patient presented yesterday to [facility] [place] with complaints of dark urine and labs showed elevated LFTs. RUQ ultrasound was performed showing cholelithiasis without cholangitis however the CBD was not visualized. The patient was told that he would be transferred to [facility] last night however he left AMA prior to transfer. This morning the patient presented to [facility] with complaints of dark urine and green stool. The patient states he has been having dark "almost brown" urine for the last 1-2 weeks but believes it may have been going on for a longer period of time.

## 2021-07-02 NOTE — ED ADULT NURSE NOTE - ED STAT RN HANDOFF DETAILS
report given to ABEBE Powers in holding room. Pt stable, no complaints at this time. NAD noted. Safety maintained.

## 2021-07-02 NOTE — ED PROVIDER NOTE - ATTENDING CONTRIBUTION TO CARE
I, Juan Villaseñor, personally saw the patient with the resident, and completed the key components of the history and physical exam. I then discussed the management plan with the resident.     67 yo M hx of kidney stone p/w painless jaundice and dark urine x 5 days. patient went to [facility] for discolored urine and found to have high bilirubin and transamitis. patient had ultrasound that showed gallstone but unable to visualize CBD. patient signed out AMA. patient now found to have bili 9.4, alkphos 205, , . CT abdomen showed infiltrate of pancrease. patient admitted for further management.
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
## 2021-07-02 NOTE — ED ADULT NURSE NOTE - OBJECTIVE STATEMENT
Assumed Pt at 1015. Pt AOx4 no acute distress, Pt complaining of yellowing of the eye, brown urine, and green stool. Pt denies pain, difficulty urinating. Assumed Pt at 1015. Pt AOx4 no acute distress, Pt complaining of yellowing of the eye, brown urine, and green stool. Pt with no other complaints for RN at this time. Pt denies pain, difficulty urinating. Pt breathing even and unlabored will continue to monitor. Assumed Pt at 1015. Pt AOx4 no acute distress, Pt complaining of yellowing of the eye, brown urine, and green stool. Pt with no other complaints for RN at this time. Pt denies pain, difficulty urinating. Pt breathing even and unlabored will. Pt pending  CT continue to monitor. Assumed Pt at 1015. Pt AOx4 no acute distress, Pt complaining of yellowing of the eye, brown urine, and green stool. Pt with no other complaints for RN at this time. Pt denies pain, ABD soft and non tender, N/V/D and urinary symptoms. Pt breathing even and unlabored will. Pt pending  CT continue to monitor.

## 2021-07-02 NOTE — H&P ADULT - NSHPREVIEWOFSYSTEMS_GEN_ALL_CORE
Review of Systems:  CONSTITUTIONAL: No weakness, fevers or chills  EYES/ENT: No visual changes;  No vertigo or throat pain   NECK: No pain or stiffness  RESPIRATORY: No cough, wheezing, hemoptysis; No shortness of breath,   CARDIOVASCULAR: No chest pain or palpitations  GASTROINTESTINAL: No abdominal or epigastric pain. No nausea, vomiting, or hematemesis; No diarrhea or constipation.   GENITOURINARY: No dysuria, frequency or hematuria  NEUROLOGICAL: No numbness or weakness  SKIN: No itching, burning, rashes, or lesions   All other review of systems is negative unless indicated above Review of Systems:  CONSTITUTIONAL: No weakness, fevers or chills  EYES/ENT: No visual changes;  No vertigo or throat pain   NECK: No pain or stiffness  RESPIRATORY: No cough, wheezing, hemoptysis; No shortness of breath   CARDIOVASCULAR: No chest pain or palpitations  GASTROINTESTINAL: No abdominal or epigastric pain. No nausea, vomiting, or hematemesis; No diarrhea or constipation.   GENITOURINARY: No dysuria, frequency or hematuria  NEUROLOGICAL: No numbness or weakness  SKIN: No itching, burning, rashes, or lesions   All other review of systems is negative unless indicated above

## 2021-07-02 NOTE — H&P ADULT - NSICDXFAMILYHX_GEN_ALL_CORE_FT
FAMILY HISTORY:  Mother  Still living? Unknown  Family history of heart murmur, Age at diagnosis: Age Unknown

## 2021-07-02 NOTE — H&P ADULT - NSHPPHYSICALEXAM_GEN_ALL_CORE
Vital Signs Last 24 Hrs  T(F): 98.2 (02 Jul 2021 11:23), Max: 99.2 (02 Jul 2021 09:38)  HR: 63 (02 Jul 2021 11:23) (63 - 75)  BP: 197/74 (02 Jul 2021 13:22) (191/104 - 205/101)  RR: 16 (02 Jul 2021 11:23) (16 - 16)  SpO2: 98% (02 Jul 2021 11:23) (97% - 98%)    Physical Exam:  Constitutional: alert and oriented, in no acute distress   Neck: Soft and supple, No LAD, No JVD  Respiratory: Clear to auscultation bilaterally, no wheezes or crackles  Cardiovascular: Regular rate and rhyhtm, no murmurs, gallops, rubs  Gastrointestinal: Soft, non-tender to palpation, +bs  Vascular: 2+ peripheral pulses  Neurological: A/O x 3, no focal neurological deficits  Musculoskeletal: 5/5 strength b/l upper and lower extremities, no lower extremity edema bilaterally  Skin:  Psych: Vital Signs Last 24 Hrs  T(F): 98.2 (02 Jul 2021 11:23), Max: 99.2 (02 Jul 2021 09:38)  HR: 63 (02 Jul 2021 11:23) (63 - 75)  BP: 197/74 (02 Jul 2021 13:22) (191/104 - 205/101)  RR: 16 (02 Jul 2021 11:23) (16 - 16)  SpO2: 98% (02 Jul 2021 11:23) (97% - 98%)    Physical Exam:  Constitutional: alert and oriented, in no acute distress   Neck: Soft and supple, No LAD, No JVD  Respiratory: Clear to auscultation bilaterally, no wheezes or crackles  Cardiovascular: Regular rate and rhythm, no murmurs, gallops, rubs  Gastrointestinal: Soft, non-tender to palpation, +bs  Vascular: 2+ peripheral pulses  Neurological: A/O x 3, no focal neurological deficits  Musculoskeletal: 5/5 strength b/l upper and lower extremities, no lower extremity edema bilaterally  Skin: no rashes  Psych: answers questions appropriately

## 2021-07-02 NOTE — ED ADULT NURSE NOTE - CHPI ED NUR SYMPTOMS NEG
no abdominal distension/no blood in stool/no burning urination/no chills/no diarrhea/no nausea/no vomiting

## 2021-07-02 NOTE — CONSULT NOTE ADULT - SUBJECTIVE AND OBJECTIVE BOX
HPI:  65 y/o M with PMH of kidney stone presenting for painless jaundice. The patient presented yesterday to [facility] [place] with complaints of dark urine and labs showed elevated LFTs. RUQ ultrasound was performed showing cholelithiasis without cholangitis however the CBD was not visualized. The patient was told that he would be transferred to [facility] last night however he left AMA prior to transfer. This morning the patient presented to [facility] with complaints of dark urine. He was noted to be elevated LFTs and noted to have pancreatic mass.       PAST MEDICAL & SURGICAL HISTORY:  Kidney stone  Hypertension        ROS:  No Heartburn, regurgitation, dysphagia, odynophagia.  No dyspepsia  No abdominal pain.    No Nausea, vomiting.  No Bleeding.  No hematemesis.   No diarrhea.    No hematochezia  No weight loss, anorexia.  No edema.      MEDICATIONS  (STANDING):    MEDICATIONS  (PRN):      Allergies    No Known Allergies    Intolerances        SOCIAL HISTORY:Ex smoker. Occasional alcohol.     ENDOSCOPIC/GI HISTORY: NC    FAMILY HISTORY:  Family history of heart murmur (Mother)        Vital Signs Last 24 Hrs  T(C): 36.7 (02 Jul 2021 16:59), Max: 37.3 (02 Jul 2021 09:38)  T(F): 98.1 (02 Jul 2021 16:59), Max: 99.2 (02 Jul 2021 09:38)  HR: 70 (02 Jul 2021 16:59) (63 - 75)  BP: 165/103 (02 Jul 2021 16:59) (165/103 - 205/101)  BP(mean): --  RR: 18 (02 Jul 2021 16:59) (16 - 18)  SpO2: 95% (02 Jul 2021 16:59) (95% - 98%)    PHYSICAL EXAM:    GENERAL: NAD, well-groomed, well-developed  HEAD:  Atraumatic, Normocephalic  EYES: EOMI, PERRLA, conjunctiva and sclera icterus  ENMT: No tonsillar erythema, exudates, or enlargement; Moist mucous membranes, Good dentition, No lesions  NECK: Supple, No JVD, Normal thyroid  CHEST/LUNG: Clear to percussion bilaterally; No rales, rhonchi, wheezing, or rubs  HEART: Regular rate and rhythm; No murmurs, rubs, or gallops  ABDOMEN: Soft, Nontender, Nondistended; Bowel sounds present  EXTREMITIES:  2+ Peripheral Pulses, No clubbing, cyanosis, or edema  LYMPH: No lymphadenopathy noted  SKIN: No rashes or lesions      LABS:                        15.5   6.87  )-----------( 275      ( 02 Jul 2021 11:37 )             44.1     07-02    136  |  101  |  14.2  ----------------------------<  109<H>  3.5   |  22.0  |  0.57    Ca    9.4      02 Jul 2021 11:37    TPro  8.0  /  Alb  4.2  /  TBili  9.4<H>  /  DBili  x   /  AST  434<H>  /  ALT  736<H>  /  AlkPhos  205<H>  07-02    PT/INR - ( 02 Jul 2021 11:37 )   PT: 12.7 sec;   INR: 1.10 ratio         PTT - ( 02 Jul 2021 11:37 )  PTT:32.5 sec       LIVER FUNCTIONS - ( 02 Jul 2021 11:37 )  Alb: 4.2 g/dL / Pro: 8.0 g/dL / ALK PHOS: 205 U/L / ALT: 736 U/L / AST: 434 U/L / GGT: x               RADIOLOGY & ADDITIONAL STUDIES:  < from: CT Abdomen and Pelvis w/ Oral Cont and w/ IV Cont (07.02.21 @ 14:29) >     EXAM:  CT ABDOMEN AND PELVIS OC IC                          PROCEDURE DATE:  07/02/2021          INTERPRETATION:  CLINICAL INFORMATION: Jaundice.    COMPARISON: Right upper quadrant ultrasound dated July 1, 2021.    CONTRAST/COMPLICATIONS:  IV Contrast: Omnipaque 300  95 cc administered   5 cc discarded  Oral Contrast: Omnipaque 300 + Fruit 2o  Complications: None reported at time of study completion    PROCEDURE:  CT of the Abdomen and Pelvis was performed.  Sagittal and coronal reformats were performed.    FINDINGS:  LOWER CHEST: Within normal limits.    LIVER: Moderate diffuse decreased attenuation of the liver, compatible with steatosis. The hepatic veins and portal vein are patent. There are shotty jude hepatis and portacaval lymph nodes. Peripancreatic adenopathy. There is no evidence of vascular involvement of the SMA or  BILE DUCTS: There is moderate intra and extrahepatic biliary duct dilatation extending to the level of the pancreas.  GALLBLADDER: There are small layering gallstones in the distended gallbladder.  SPLEEN: Within normal limits.  PANCREAS: There is an infiltrating enhancing 2.4 x 2.3 cm mass in the pancreatic head on image 58. There is moderate dilatation of the upstream pancreatic duct to 5 mm within the neck. There is no evidence of vascular involvement of the SMA, portal vein, or SMV.  ADRENALS: Within normal limits.  KIDNEYS/URETERS: Within normal limits.    BLADDER: The right anterior bladder extends towards open right inguinal ring. Otherwise, smooth in contour.  REPRODUCTIVE ORGANS: The prostate is not enlarged.    BOWEL: The stomach is unremarkable. Small and large bowel loops are unremarkable with no evidence of obstruction, bowel wall thickening, or inflammatory stranding of the mesentericfat. The appendix is unremarkable. There is no mesenteric adenopathy.  PERITONEUM: No ascites.  VESSELS: Within normal limits.  RETROPERITONEUM/LYMPH NODES: No lymphadenopathy.  ABDOMINAL WALL: Within normal limits.  BONES: Degenerative disc disease and spondylosis of the lumbar spine.    IMPRESSION: Infiltrating pancreatic mass in the pancreatic head with obstruction and dilatation of the biliary tree and pancreatic duct, favored to represent pancreatic adenocarcinoma. No evidence of vascular involvement. This lesion is amenable to EUS guided biopsy. A dedicated pre and postcontrast MRI of the abdomen can be performed to exclude metastatic disease to the liver given degree of fatty infiltration.            CORBIN CHATTERJEE MD; Attending Radiologist  This document has been electronically signed. Jul 2 2021  2:44PM    < end of copied text >

## 2021-07-02 NOTE — ED PROVIDER NOTE - NS ED ROS FT
Constitutional: no fever, no sweats, and no chills.  CV: no chest pain, no edema.  Resp: no cough, no dyspnea  GI: no abdominal pain, no nausea and no vomiting.  MSK: no msk pain, no weakness  Skin: no lesions, and no rashes.  Neuro: no headache, no dizziness  ROS otherwise negative except as noted in HPI.

## 2021-07-02 NOTE — ED ADULT NURSE REASSESSMENT NOTE - NS ED NURSE REASSESS COMMENT FT1
Pt sitting comfortably in bed /104 MD Britton made aware meds to be given as per orders no complaints at this time will continue to monitor.

## 2021-07-02 NOTE — H&P ADULT - NSHPSOCIALHISTORY_GEN_ALL_CORE
Denies alcohol or drug use  Endorses smoking 2 packs per day for 25 years, quit in 2007  Lives alone

## 2021-07-02 NOTE — H&P ADULT - NSHPLABSRESULTS_GEN_ALL_CORE
15.5   6.87  )-----------( 275      ( 02 Jul 2021 11:37 )             44.1   07-02    136  |  101  |  14.2  ----------------------------<  109<H>  3.5   |  22.0  |  0.57    Ca    9.4      02 Jul 2021 11:37    TPro  8.0  /  Alb  4.2  /  TBili  9.4<H>  /  DBili  x   /  AST  434<H>  /  ALT  736<H>  /  AlkPhos  205<H>  07-02

## 2021-07-02 NOTE — H&P ADULT - ASSESSMENT
65 y/o M with PMH of kidney stone presenting for painless jaundice. [place] [facility] [place] of [place] and labs showed elevated LFTs. RUQ ultrasound was performed showing cholelithiasis without cholangitis however the CBD was not visualized. ***    Painless jaundice  - high suspicion for pancreatic cancer  - Bilirubin: 9.4  - Alk phos 205, AST//736  - CT abdomen/pelvis:  Infiltrating pancreatic mass in the pancreatic head with obstruction and dilatation of the biliary tree and pancreatic duct, favored to represent pancreatic adenocarcinoma. No evidence of vascular involvement. This lesion is amenable to EUS guided biopsy. A dedicated pre and postcontrast MRI of the abdomen can be performed to exclude metastatic disease to the liver given degree of fatty infiltration.  - GI consulted     DVT ppx  - Lovenox SQ 65 y/o M with PMH of kidney stone presenting for painless jaundice. The patient presented yesterday to [facility] [place] with complaints of dark urine and labs showed elevated LFTs. RUQ ultrasound was performed showing cholelithiasis without cholangitis however the CBD was not visualized. CT scan significant for infiltrating pancreatic mass in the pancreatic head with obstruction and dilatation of the biliary tree and pancreatic duct. Will undergo EGD tomorrow.     Painless jaundice  - high suspicion for pancreatic cancer  - Bilirubin: 9.4  - Alk phos 205, AST//736  - CT abdomen/pelvis:  Infiltrating pancreatic mass in the pancreatic head with obstruction and dilatation of the biliary tree and pancreatic duct, favored to represent pancreatic adenocarcinoma. No evidence of vascular involvement. This lesion is amenable to EUS guided biopsy. A dedicated pre and postcontrast MRI of the abdomen can be performed to exclude metastatic disease to the liver given degree of fatty infiltration.  - GI consulted, EGD tomorrow  - NPO after midnight    DVT ppx  - SCD  - ambulate as tolerated 65 y/o M with PMH of kidney stone presenting for painless jaundice. The patient presented yesterday to [facility] [place] with complaints of dark urine and labs showed elevated LFTs. RUQ ultrasound was performed showing cholelithiasis without cholangitis however the CBD was not visualized. CT scan significant for infiltrating pancreatic mass in the pancreatic head with obstruction and dilatation of the biliary tree and pancreatic duct. Will undergo EGD tomorrow.     Painless jaundice  - high suspicion for pancreatic cancer  - Bilirubin: 9.4  - Alk phos 205, AST//736  - CT abdomen/pelvis:  Infiltrating pancreatic mass in the pancreatic head with obstruction and dilatation of the biliary tree and pancreatic duct, favored to represent pancreatic adenocarcinoma. No evidence of vascular involvement. This lesion is amenable to EUS guided biopsy. A dedicated pre and postcontrast MRI of the abdomen can be performed to exclude metastatic disease to the liver given degree of fatty infiltration.  - GI consulted, EGD tomorrow  - NPO after midnight  - no absolute medical contraindication to proceed with procedure    DVT ppx  - SCD  - ambulate as tolerated

## 2021-07-03 NOTE — PROGRESS NOTE ADULT - SUBJECTIVE AND OBJECTIVE BOX
Patient is a 66y old  Male who presents with a chief complaint of Painless Jaundice (03 Jul 2021 06:51)    pt seen and exam. New pt to me. Chart reviewed.  Pt denies any abd pain,n/v/d,cp,sob,weakness,numbness.     REVIEW OF SYSTEMS: All systems are reviewed and found to be negative except above    MEDICATIONS  (STANDING):  amLODIPine   Tablet 2.5 milliGRAM(s) Oral daily  ciprofloxacin   IVPB 400 milliGRAM(s) IV Intermittent once  indomethacin Suppository 100 milliGRAM(s) Rectal once  sodium chloride 0.9%. 1000 milliLiter(s) (50 mL/Hr) IV Continuous <Continuous>    MEDICATIONS  (PRN):      CAPILLARY BLOOD GLUCOSE        I&O's Summary      PHYSICAL EXAM:  Vital Signs Last 24 Hrs  T(C): 36.6 (03 Jul 2021 11:27), Max: 37 (03 Jul 2021 05:03)  T(F): 97.9 (03 Jul 2021 11:27), Max: 98.6 (03 Jul 2021 05:03)  HR: 90 (03 Jul 2021 11:29) (59 - 94)  BP: 172/88 (03 Jul 2021 11:29) (131/73 - 197/74)  BP(mean): --  RR: 20 (03 Jul 2021 11:27) (18 - 20)  SpO2: 97% (03 Jul 2021 11:27) (95% - 99%)    CONSTITUTIONAL: NAD,  EYES: PERRLA; conjunctiva and sclera clear  ENMT: Moist oral mucosa,   RESPIRATORY: Normal respiratory effort; lungs are clear to auscultation bilaterally  CARDIOVASCULAR: Regular rate and rhythm, normal S1 and S2, no murmur   EXTS: No lower extremity edema; Peripheral pulses are 2+ bilaterally  ABDOMEN: Nontender to palpation, normoactive bowel sounds, no rebound/guarding;   MUSCLOSKELETAL:   no clubbing or cyanosis of digits; no joint swelling or tenderness to palpation  PSYCH: affect appropriate  NEUROLOGY: A+O to person, place, and time; CN 2-12 are intact and symmetric; no gross sensory deficits;       LABS:                        15.3   7.68  )-----------( 301      ( 03 Jul 2021 10:37 )             42.8     07-03    136  |  102  |  12.8  ----------------------------<  110<H>  3.5   |  19.0<L>  |  0.47<L>    Ca    9.5      03 Jul 2021 10:37    TPro  7.8  /  Alb  3.9  /  TBili  11.2<H>  /  DBili  7.9<H>  /  AST  393<H>  /  ALT  622<H>  /  AlkPhos  199<H>  07-03    PT/INR - ( 02 Jul 2021 11:37 )   PT: 12.7 sec;   INR: 1.10 ratio         PTT - ( 02 Jul 2021 11:37 )  PTT:32.5 sec            RADIOLOGY & ADDITIONAL TESTS:  Results Reviewed:   Imaging Personally Reviewed:  Electrocardiogram Personally Reviewed:    COORDINATION OF CARE:  Care Discussed with Consultants/Other Providers [Y/N]:  Prior or Outpatient Records Reviewed [Y/N]:   Patient is a 66y old  Male who presents with a chief complaint of Painless Jaundice (03 Jul 2021 06:51)    pt seen and exam. New pt to me. Chart reviewed.  Pt denies any abd pain,n/v/d,cp,sob,weakness,numbness.     REVIEW OF SYSTEMS: All systems are reviewed and found to be negative except above    MEDICATIONS  (STANDING):  amLODIPine   Tablet 2.5 milliGRAM(s) Oral daily  ciprofloxacin   IVPB 400 milliGRAM(s) IV Intermittent once  indomethacin Suppository 100 milliGRAM(s) Rectal once  sodium chloride 0.9%. 1000 milliLiter(s) (50 mL/Hr) IV Continuous <Continuous>    MEDICATIONS  (PRN):      CAPILLARY BLOOD GLUCOSE        I&O's Summary      PHYSICAL EXAM:  Vital Signs Last 24 Hrs  T(C): 36.6 (03 Jul 2021 11:27), Max: 37 (03 Jul 2021 05:03)  T(F): 97.9 (03 Jul 2021 11:27), Max: 98.6 (03 Jul 2021 05:03)  HR: 90 (03 Jul 2021 11:29) (59 - 94)  BP: 172/88 (03 Jul 2021 11:29) (131/73 - 197/74)  BP(mean): --  RR: 20 (03 Jul 2021 11:27) (18 - 20)  SpO2: 97% (03 Jul 2021 11:27) (95% - 99%)    CONSTITUTIONAL: NAD,  EYES: PERRLA; conjunctiva and sclera icteric  ENMT: Moist oral mucosa,   RESPIRATORY: Normal respiratory effort; lungs are clear to auscultation bilaterally  CARDIOVASCULAR: Regular rate and rhythm, normal S1 and S2, no murmur   EXTS: No lower extremity edema; Peripheral pulses are 2+ bilaterally  ABDOMEN: Nontender to palpation, normoactive bowel sounds, no rebound/guarding;   MUSCLOSKELETAL:  no joint swelling or tenderness to palpation  PSYCH: affect appropriate  NEUROLOGY: A+O to person, place, and time; CN 2-12 are intact and symmetric; no acute focal deficits      LABS:                        15.3   7.68  )-----------( 301      ( 03 Jul 2021 10:37 )             42.8     07-03    136  |  102  |  12.8  ----------------------------<  110<H>  3.5   |  19.0<L>  |  0.47<L>    Ca    9.5      03 Jul 2021 10:37    TPro  7.8  /  Alb  3.9  /  TBili  11.2<H>  /  DBili  7.9<H>  /  AST  393<H>  /  ALT  622<H>  /  AlkPhos  199<H>  07-03    PT/INR - ( 02 Jul 2021 11:37 )   PT: 12.7 sec;   INR: 1.10 ratio         PTT - ( 02 Jul 2021 11:37 )  PTT:32.5 sec            RADIOLOGY & ADDITIONAL TESTS:  Results Reviewed:   Imaging Personally Reviewed:  Electrocardiogram Personally Reviewed:    COORDINATION OF CARE:  Care Discussed with Consultants/Other Providers [Y/N]:  Prior or Outpatient Records Reviewed [Y/N]:

## 2021-07-03 NOTE — PROGRESS NOTE ADULT - ASSESSMENT
67 y/o M with PMH of kidney stone presenting for painless jaundice. The patient presented yesterday to [facility] [place] with complaints of dark urine and labs showed elevated LFTs. RUQ ultrasound was performed showing cholelithiasis without cholangitis however the CBD was not visualized. CT scan significant for infiltrating pancreatic mass in the pancreatic head with obstruction and dilatation of the biliary tree and pancreatic duct. Will undergo EGD tomorrow.     Painless jaundice  - high suspicion for pancreatic cancer  - Bilirubin: 9.4  - Alk phos 205, AST//736  - CT abdomen/pelvis:  Infiltrating pancreatic mass in the pancreatic head with obstruction and dilatation of the biliary tree and pancreatic duct, favored to represent pancreatic adenocarcinoma. No evidence of vascular involvement. This lesion is amenable to EUS guided biopsy. A dedicated pre and postcontrast MRI of the abdomen can be performed to exclude metastatic disease to the liver given degree of fatty infiltration.  - GI consulted, EGD tomorrow  - NPO after midnight  - no absolute medical contraindication to proceed with procedure    DVT ppx  - SCD  - ambulate as tolerated 67 y/o M with PMH of kidney stone presenting for painless jaundice. [place] [facility] [place] of [place] and labs showed elevated LFTs. RUQ ultrasound was performed showing cholelithiasis without cholangitis however the CBD was not visualized. CT scan significant for infiltrating pancreatic mass in the pancreatic head with obstruction and dilatation of the biliary tree and pancreatic duct. Will undergo EGD tomorrow.     Painless jaundice, pancreatic mass with transaminitis ,hyperbilirubinemia   - high suspicion for pancreatic cancer    - CT abdomen/pelvis:  Infiltrating pancreatic mass in the pancreatic head with obstruction and dilatation of the biliary tree and pancreatic duct, favored to represent pancreatic adenocarcinoma. No evidence of vascular involvement. This lesion is amenable to EUS guided biopsy. A dedicated pre and postcontrast MRI of the abdomen can be performed to exclude metastatic disease to the liver given degree of fatty infiltration.  - plan for ERCP. BP high now,was stable when I saw the pt. Pt was given one dose of iv hydralazine 11 am. pt is already taken to endo lab, spoke with Dr Fry to recheck bp before procedure if still high >160 wait until bp stable. order iv hydralazine one dose.         DVT ppx  - SCD  - ambulate as tolerated  care of plan dw pt  louis rn

## 2021-07-03 NOTE — CHART NOTE - NSCHARTNOTEFT_GEN_A_CORE
AS per ABEBE Davenport, Dr. Fry requested normal saline 0.9% IV to run at 50ml/hr due to patient is NPO and is scheduled for a procedure today. I placed order as requested by RN as per MD.

## 2021-07-03 NOTE — PROGRESS NOTE ADULT - ASSESSMENT
67 y/o M w/ no other major problems presenting with dark urine and painless jaundice and evidence a pancreatic head mass w/ pancreatic ductal dilation and no obvious vascular invasion. Admitted to medicine for further workup.  -Plan for EUS w/ FNA by Dr. Fry for further workup of mass.  -Recommend chest CT for staging  -Recommend tumor markers - , CEA  - Surgery will continue to follow

## 2021-07-03 NOTE — PROGRESS NOTE ADULT - SUBJECTIVE AND OBJECTIVE BOX
INTERVAL HPI/OVERNIGHT EVENTS:    Patient evaluated at bedside. No acute distress. No acute events overnight.    MEDICATIONS  (STANDING):  amLODIPine   Tablet 2.5 milliGRAM(s) Oral daily  ciprofloxacin   IVPB 400 milliGRAM(s) IV Intermittent once  indomethacin Suppository 100 milliGRAM(s) Rectal once    MEDICATIONS  (PRN):      Vital Signs Last 24 Hrs  T(C): 37 (03 Jul 2021 05:03), Max: 37.3 (02 Jul 2021 09:38)  T(F): 98.6 (03 Jul 2021 05:03), Max: 99.2 (02 Jul 2021 09:38)  HR: 91 (03 Jul 2021 05:03) (59 - 92)  BP: 131/73 (03 Jul 2021 05:03) (131/73 - 205/101)  BP(mean): --  RR: 18 (03 Jul 2021 05:03) (16 - 18)  SpO2: 95% (03 Jul 2021 05:03) (95% - 99%)    Constitutional: NAD, well-groomed, well-developed  HEENT: Scleral icterus bilaterally, sublingual jaundice, PERRLA, EOMI, no drainage or redness  Neck: No bruits; no thyromegaly or nodules,  No JVD  Back: Normal spine flexure, No CVA tenderness, No deformity or limitation of movement  Respiratory: Breath Sounds equal & clear to percussion & auscultation, no accessory muscle use  Cardiovascular: Regular rate & rhythm, normal S1, S2; no murmurs, gallops or rubs; no S3, S4  Gastrointestinal: Soft, non-tender, no hepatosplenomegaly, normal bowel sounds  Extremities: No peripheral edema, No cyanosis, clubbing   Vascular: Equal and normal pulses: 2+ peripheral pulses throughout  Neurological: A&O x 3; no sensory, motor or coordination deficits, normal reflexes  Psychiatric: Normal mood, normal affect  Musculoskeletal: No joint pain, swelling or deformity; no limitation of movement  Skin: Diffuse jaundice, No rashes        I&O's Detail      LABS:                        15.5   6.87  )-----------( 275      ( 02 Jul 2021 11:37 )             44.1     07-02    136  |  101  |  14.2  ----------------------------<  109<H>  3.5   |  22.0  |  0.57    Ca    9.4      02 Jul 2021 11:37    TPro  8.0  /  Alb  4.2  /  TBili  9.4<H>  /  DBili  x   /  AST  434<H>  /  ALT  736<H>  /  AlkPhos  205<H>  07-02    PT/INR - ( 02 Jul 2021 11:37 )   PT: 12.7 sec;   INR: 1.10 ratio         PTT - ( 02 Jul 2021 11:37 )  PTT:32.5 sec      RADIOLOGY & ADDITIONAL STUDIES:

## 2021-07-04 NOTE — PROGRESS NOTE ADULT - SUBJECTIVE AND OBJECTIVE BOX
INTERVAL HPI/OVERNIGHT EVENTS:FU after EGD/EUS/ERCP. LFTs trending down. Ca 19-9 level is elevated.    MEDICATIONS  (STANDING):  amLODIPine   Tablet 2.5 milliGRAM(s) Oral daily  ciprofloxacin   IVPB 400 milliGRAM(s) IV Intermittent once  indomethacin Suppository 100 milliGRAM(s) Rectal once  potassium chloride    Tablet ER 40 milliEquivalent(s) Oral once  sodium chloride 0.9%. 1000 milliLiter(s) (50 mL/Hr) IV Continuous <Continuous>    MEDICATIONS  (PRN):      Allergies    No Known Allergies    Intolerances    hydrALAZINE (Vomiting)      Vital Signs Last 24 Hrs  T(C): 36.9 (04 Jul 2021 11:31), Max: 36.9 (03 Jul 2021 14:05)  T(F): 98.4 (04 Jul 2021 11:31), Max: 98.4 (03 Jul 2021 14:05)  HR: 80 (04 Jul 2021 11:31) (75 - 84)  BP: 187/88 (04 Jul 2021 11:31) (142/77 - 187/88)  BP(mean): 111 (03 Jul 2021 14:20) (111 - 113)  RR: 18 (04 Jul 2021 11:31) (16 - 20)  SpO2: 98% (04 Jul 2021 11:31) (94% - 98%)    LABS:                        14.1   11.61 )-----------( 306      ( 04 Jul 2021 09:03 )             41.5     07-04    140  |  106  |  17.9  ----------------------------<  131<H>  3.3<L>   |  22.0  |  0.73    Ca    8.5<L>      04 Jul 2021 09:03    TPro  7.1  /  Alb  3.7  /  TBili  5.1<H>  /  DBili  x   /  AST  262<H>  /  ALT  508<H>  /  AlkPhos  163<H>  07-04      Cancer Antigen, GI Ca 19-9 (07.03.21 @ 20:13)    Cancer Antigen, GI Ca 19-9: 654: Method: Roche Electrochemiluminescence Immuno Assay  Values obtained with different assay methods or kits cannot be  used interchangeably.  Results cannot be interpreted as absolute evidence of the presence  or absence of malignant disease. U/mL        RADIOLOGY & ADDITIONAL TESTS:

## 2021-07-04 NOTE — PROGRESS NOTE ADULT - SUBJECTIVE AND OBJECTIVE BOX
Patient is a 66y old  Male who presents with a chief complaint of Painless Jaundice (04 Jul 2021 04:44)    No acute issues  pt denies cp,abd pain,sob,fever,chill.  SUBJECTIVE / OVERNIGHT EVENTS:  REVIEW OF SYSTEMS: All systems are reviewed and found to be negative except above    MEDICATIONS  (STANDING):  amLODIPine   Tablet 2.5 milliGRAM(s) Oral daily  ciprofloxacin   IVPB 400 milliGRAM(s) IV Intermittent once  indomethacin Suppository 100 milliGRAM(s) Rectal once  sodium chloride 0.9%. 1000 milliLiter(s) (50 mL/Hr) IV Continuous <Continuous>    MEDICATIONS  (PRN):      CAPILLARY BLOOD GLUCOSE        I&O's Summary    03 Jul 2021 07:01  -  04 Jul 2021 07:00  --------------------------------------------------------  IN: 240 mL / OUT: 0 mL / NET: 240 mL        PHYSICAL EXAM:  Vital Signs Last 24 Hrs  T(C): 36.8 (03 Jul 2021 22:01), Max: 36.9 (03 Jul 2021 14:05)  T(F): 98.3 (03 Jul 2021 22:01), Max: 98.4 (03 Jul 2021 14:05)  HR: 75 (03 Jul 2021 22:01) (75 - 94)  BP: 142/77 (03 Jul 2021 22:01) (142/77 - 172/88)  BP(mean): 111 (03 Jul 2021 14:20) (111 - 113)  RR: 18 (03 Jul 2021 22:01) (16 - 20)  SpO2: 98% (03 Jul 2021 22:01) (94% - 98%)    CONSTITUTIONAL: NAD,  EYES: PERRLA; conjunctiva and sclera icteric  ENMT: Moist oral mucosa,   RESPIRATORY: Normal respiratory effort; lungs are clear to auscultation bilaterally  CARDIOVASCULAR: Regular rate and rhythm, normal S1 and S2, no murmur   EXTS: No lower extremity edema; Peripheral pulses are 2+ bilaterally  ABDOMEN: Nontender to palpation, normoactive bowel sounds, no rebound/guarding;   MUSCLOSKELETAL:   no joint swelling or tenderness to palpation  PSYCH: affect appropriate  NEUROLOGY: A+O to person, place, and time;  no gross  deficits;       LABS:                        14.1   11.61 )-----------( 306      ( 04 Jul 2021 09:03 )             41.5     07-04    140  |  106  |  17.9  ----------------------------<  131<H>  3.3<L>   |  22.0  |  0.73    Ca    8.5<L>      04 Jul 2021 09:03    TPro  7.1  /  Alb  3.7  /  TBili  5.1<H>  /  DBili  x   /  AST  262<H>  /  ALT  508<H>  /  AlkPhos  163<H>  07-04    PT/INR - ( 02 Jul 2021 11:37 )   PT: 12.7 sec;   INR: 1.10 ratio         PTT - ( 02 Jul 2021 11:37 )  PTT:32.5 sec            RADIOLOGY & ADDITIONAL TESTS:  Results Reviewed:   Imaging Personally Reviewed:  Electrocardiogram Personally Reviewed:    COORDINATION OF CARE:  Care Discussed with Consultants/Other Providers [Y/N]:  Prior or Outpatient Records Reviewed [Y/N]:

## 2021-07-04 NOTE — DISCHARGE NOTE PROVIDER - CARE PROVIDER_API CALL
Ajith Fry)  Gastroenterology; Internal Medicine  [address] [place] [address]  Phone: [phone]  Fax: [phone]  Follow Up Time: 1 week    Tj Johnson)  Complex General Surgical Oncology; Surgery; Surgical Oncology  [address] [place] [address]  Phone: [phone]  Fax: [phone]  Follow Up Time: 1 week

## 2021-07-04 NOTE — PROGRESS NOTE ADULT - ASSESSMENT
67 y/o M with PMH of kidney stone presenting for painless jaundice. The patient presented yesterday to [facility] [place] with complaints of dark urine and labs showed elevated LFTs. RUQ ultrasound was performed showing cholelithiasis without cholangitis however the CBD was not visualized. CT scan significant for infiltrating pancreatic mass in the pancreatic head with obstruction and dilatation of the biliary tree and pancreatic duct. Will undergo EGD tomorrow.     Painless jaundice, pancreatic mass with transaminitis ,hyperbilirubinemia On 7/3, patient underwent an EUS with CBD and pancreatic duct stent placement and FNA.   - high suspicion for pancreatic cancer. waiting for biopsy result.  -LFTS trending down. f/u gi/surgery rec    - CT abdomen/pelvis:  Infiltrating pancreatic mass in the pancreatic head with obstruction and dilatation of the biliary tree and pancreatic duct, favored to represent pancreatic adenocarcinoma. No evidence of vascular involvement. This lesion is amenable to EUS guided biopsy. A dedicated pre and postcontrast MRI of the abdomen can be performed to exclude metastatic disease to the liver given degree of fatty infiltration.      HTN  - BP stable  - continue amlodipine    hx hep C   -pt states he completed his tx 5 yrs ago, Did not continue f/u  -rec to f/u gi out pt    DVT ppx  - SCD  - ambulate as tolerated  care of plan dw pt  louis rn

## 2021-07-04 NOTE — DISCHARGE NOTE PROVIDER - PROVIDER TOKENS
PROVIDER:[TOKEN:[[id number]],FOLLOWUP:[1 week]],PROVIDER:[TOKEN:[[id number]],FOLLOWUP:[1 week]]

## 2021-07-04 NOTE — DISCHARGE NOTE PROVIDER - CARE PROVIDERS DIRECT ADDRESSES
[email or web address]

## 2021-07-04 NOTE — PROGRESS NOTE ADULT - SUBJECTIVE AND OBJECTIVE BOX
SUBJECTIVE: Pt seen. No subjective complaints this AM. Endorsing appropriate post-procedure pain.     PHYSICAL EXAM:  Constitutional: Patient well nourished. Well developed.  Eyes:  PERRL, EOMI, Conjunctiva clear.  ENMT:  WNL  Neck:  Supple.  Respiratory:  Lungs CTA, B/L, no rales , no wheezing, no rhonchi.  Cardiovascular:  S1, S2, RRR  Gastrointestinal: Abdomen soft, non distended, + BS, non tenderness  Extremities:  No edema, no calf tenderness,    I&O's Summary    03 Jul 2021 07:01  -  04 Jul 2021 04:51  --------------------------------------------------------  IN: 240 mL / OUT: 0 mL / NET: 240 mL      I&O's Detail    03 Jul 2021 07:01  -  04 Jul 2021 04:51  --------------------------------------------------------  IN:    Oral Fluid: 240 mL  Total IN: 240 mL    OUT:  Total OUT: 0 mL    Total NET: 240 mL          MEDICATIONS  (STANDING):  amLODIPine   Tablet 2.5 milliGRAM(s) Oral daily  ciprofloxacin   IVPB 400 milliGRAM(s) IV Intermittent once  indomethacin Suppository 100 milliGRAM(s) Rectal once  sodium chloride 0.9%. 1000 milliLiter(s) (50 mL/Hr) IV Continuous <Continuous>    MEDICATIONS  (PRN):      LABS:                        15.3   7.68  )-----------( 301      ( 03 Jul 2021 10:37 )             42.8     07-03    136  |  102  |  12.8  ----------------------------<  110<H>  3.5   |  19.0<L>  |  0.47<L>    Ca    9.5      03 Jul 2021 10:37    TPro  7.8  /  Alb  3.9  /  TBili  11.2<H>  /  DBili  7.9<H>  /  AST  393<H>  /  ALT  622<H>  /  AlkPhos  199<H>  07-03    PT/INR - ( 02 Jul 2021 11:37 )   PT: 12.7 sec;   INR: 1.10 ratio         PTT - ( 02 Jul 2021 11:37 )  PTT:32.5 sec      RADIOLOGY & ADDITIONAL STUDIES:

## 2021-07-04 NOTE — DISCHARGE NOTE PROVIDER - HOSPITAL COURSE
67 y/o M with PMH of kidney stone presenting for painless jaundice. [place] [facility] [place] of [place] and labs showed elevated LFTs. RUQ ultrasound was performed showing cholelithiasis without cholangitis however the CBD was not visualized. CT scan significant for infiltrating pancreatic mass in the pancreatic head with obstruction and dilatation of the biliary tree and pancreatic duct.  Pt underwent an EUS with CBD and pancreatic duct stent placement and FNA. Will f/u biosy result with GI in one week out pt and will f/u lfts.    Painless jaundice, pancreatic mass with transaminitis ,hyperbilirubinemia On 7/3, patient underwent an EUS with CBD and pancreatic duct stent placement and FNA.   - high suspicion for pancreatic cancer. waiting for biopsy result.  -LFTS trending down. f/u gi/surgery out pt for further work up.    - CT abdomen/pelvis:  Infiltrating pancreatic mass in the pancreatic head with obstruction and dilatation of the biliary tree and pancreatic duct, favored to represent pancreatic adenocarcinoma. No evidence of vascular involvement. This lesion is amenable to EUS guided biopsy. A dedicated pre and postcontrast MRI of the abdomen can be performed to exclude metastatic disease to the liver given degree of fatty infiltration.      HTN  - continue amlodipine  -hydralazine  -f/u pcp    hx hep C   -pt states he completed his tx 5 yrs ago, Did not continue f/u  -rec to f/u gi out pt    time spent>35 minutes 67 y/o M with PMH of kidney stone presenting for painless jaundice. The patient [place] yesterday [place] [facility] [place] of dark urine and labs showed elevated LFTs. RUQ ultrasound was performed showing cholelithiasis without cholangitis however the CBD was not visualized. CT scan significant for infiltrating pancreatic mass in the pancreatic head with obstruction and dilatation of the biliary tree and pancreatic duct.  Pt underwent an EUS with CBD and pancreatic duct stent placement and FNA. Will f/u biosy result with GI in one week out pt and will f/u lfts.    Painless jaundice, pancreatic mass with transaminitis ,hyperbilirubinemia On 7/3, patient underwent an EUS with CBD and pancreatic duct stent placement and FNA.   - high suspicion for pancreatic cancer. waiting for biopsy result.  -LFTS trending down. f/u gi/surgery out pt for further work up.    - CT abdomen/pelvis:  Infiltrating pancreatic mass in the pancreatic head with obstruction and dilatation of the biliary tree and pancreatic duct, favored to represent pancreatic adenocarcinoma. No evidence of vascular involvement. This lesion is amenable to EUS guided biopsy. A dedicated pre and postcontrast MRI of the abdomen can be performed to exclude metastatic disease to the liver given degree of fatty infiltration.      HTN  - continue amlodipine  -clonidine  -f/u pcp    hx hep C   -pt states he completed his tx 5 yrs ago, Did not continue f/u  -rec to f/u gi out pt    time spent>35 minutes

## 2021-07-04 NOTE — DISCHARGE NOTE PROVIDER - NSDCMRMEDTOKEN_GEN_ALL_CORE_FT
amLODIPine 5 mg oral tablet: 1 tab(s) orally once a day    amLODIPine 5 mg oral tablet: 1 tab(s) orally once a day   cloNIDine 0.1 mg oral tablet: 1 tab(s) orally 2 times a day

## 2021-07-04 NOTE — PROGRESS NOTE ADULT - ASSESSMENT
65 yo male presenting with painless jaundice with an obstructive pancreatic mass - 2.4 x 2.3cm with 5mm ductal dilatation, no vascular involvement. On 7/3, patient underwent an EUS with CBD and pancreatic duct stent placement and FNA.     - Rec CT Chest for Staging  - F/u biopsies    Discussed with Dr. Chung

## 2021-07-04 NOTE — DISCHARGE NOTE NURSING/CASE MANAGEMENT/SOCIAL WORK - PATIENT PORTAL LINK FT
You can access the FollowMyHealth Patient Portal offered by [facility] by registering at the following website: [email or web address]. By joining [facility]’s FollowMyHealth portal, you will also be able to view your health information using other applications (apps) compatible with our system.

## 2021-07-04 NOTE — DISCHARGE NOTE PROVIDER - NSDCCPCAREPLAN_GEN_ALL_CORE_FT
PRINCIPAL DISCHARGE DIAGNOSIS  Diagnosis: Pancreatic mass  Assessment and Plan of Treatment:       SECONDARY DISCHARGE DIAGNOSES  Diagnosis: Painless jaundice  Assessment and Plan of Treatment:     Diagnosis: Transaminitis  Assessment and Plan of Treatment:     Diagnosis: HTN (hypertension)  Assessment and Plan of Treatment:

## 2021-07-04 NOTE — PROGRESS NOTE ADULT - ASSESSMENT
Patient with obstructive jaudice related to pancreatic mass s/p FNA and bile duct stent placement. PD stent placed for preventing post procedure pancreatitis. Erosive gastritis.  Await pathology results  Can follow up as outpatient with us   Can be discharged from GI point of view

## 2021-07-06 PROBLEM — Z00.00 ENCOUNTER FOR PREVENTIVE HEALTH EXAMINATION: Status: ACTIVE | Noted: 2021-01-01

## 2021-07-07 PROBLEM — R17 PAINLESS JAUNDICE: Status: ACTIVE | Noted: 2021-01-01

## 2021-07-07 PROBLEM — Z87.891 FORMER SMOKER: Status: ACTIVE | Noted: 2021-01-01

## 2021-07-07 PROBLEM — Z87.442 HISTORY OF KIDNEY STONES: Status: RESOLVED | Noted: 2021-01-01 | Resolved: 2021-01-01

## 2021-07-12 PROBLEM — K29.70 HELICOBACTER POSITIVE GASTRITIS: Status: ACTIVE | Noted: 2021-01-01

## 2021-07-23 PROBLEM — Z83.3 FAMILY HISTORY OF DIABETES MELLITUS: Status: ACTIVE | Noted: 2021-01-01

## 2021-07-23 PROBLEM — Z78.9 DOES NOT USE ILLICIT DRUGS: Status: ACTIVE | Noted: 2021-01-01

## 2021-07-23 PROBLEM — Z78.9 DENIES ALCOHOL CONSUMPTION: Status: ACTIVE | Noted: 2021-01-01

## 2021-07-23 PROBLEM — Z82.49 FAMILY HISTORY OF HEART MURMUR: Status: ACTIVE | Noted: 2021-01-01

## 2021-07-23 PROBLEM — Z78.9 CAFFEINE USE: Status: ACTIVE | Noted: 2021-01-01

## 2021-07-23 PROBLEM — Z82.49 FAMILY HISTORY OF HYPERTENSION: Status: ACTIVE | Noted: 2021-01-01

## 2021-07-23 PROBLEM — Z81.1 FAMILY HISTORY OF ALCOHOL ABUSE: Status: ACTIVE | Noted: 2021-01-01

## 2021-07-23 PROBLEM — Z01.810 PREOPERATIVE CARDIOVASCULAR EXAMINATION: Status: ACTIVE | Noted: 2021-01-01

## 2021-07-23 PROBLEM — I10 HYPERTENSION: Status: ACTIVE | Noted: 2021-01-01

## 2021-07-28 NOTE — H&P PST ADULT - ATTENDING COMMENTS
Risks, benefits, and alternatives discussed with the patient - he expressed understanding and agrees to proceed with robotic-assisted laparoscopic Whipple procedure, possible open.

## 2021-07-28 NOTE — H&P PST ADULT - GASTROINTESTINAL COMMENTS
65 y/o male with hx of jaundice, dark urine and elevated LFT's.  pt found ot have pancreatic mass, s/p biliary stent placement at [facility].  Pt now scheduled for Robotic Assisted Whipple, possible open

## 2021-07-28 NOTE — H&P PST ADULT - SKIN
Patient called and told to hold his warfarin, contact Dr. Lee, and s/sx of bleeding, reasons to got to the ED, or call 911.
No lesions; no rash

## 2021-07-28 NOTE — H&P PST ADULT - NSICDXPASTSURGICALHX_GEN_ALL_CORE_FT
PAST SURGICAL HISTORY:  History of biliary duct stent placement 7/2021 [facility]     PAST SURGICAL HISTORY:  History of biliary duct stent placement 7/3/2021 [facility]

## 2021-07-28 NOTE — H&P PST ADULT - NSICDXPROBLEM_GEN_ALL_CORE_FT
PROBLEM DIAGNOSES  Problem: Pancreatic tumor  Assessment and Plan: Robotic Assisted Whipple, possible open  CBC CMP PT/PTT T&S  EKG  Preop instructions and antibacterial soap given and explained (verbal and written), with teach back.   PRICILA precations    Problem: Hypertension  Assessment and Plan: Pt to obrain cardiology eval preop. REquested onc bravo Sabillon scheduled this week, report requested on chart       PROBLEM DIAGNOSES  Problem: Pancreatic tumor  Assessment and Plan: Robotic Assisted Whipple, possible open  CBC CMP PT/PTT T&S  EKG  Preop instructions and antibacterial soap given and explained (verbal and written), with teach back.   PRICILA precautions    Problem: Hypertension  Assessment and Plan: Pt to obtain cardiology eval preop, requested one chart  Echo scheduled this week, report requested on chart.

## 2021-07-28 NOTE — H&P PST ADULT - HISTORY OF PRESENT ILLNESS
67 y/o male with hx of jaundice, dark urine and elevated LFT's  67 y/o male with hx of jaundice, dark urine and elevated LFT's.  pt found ot have pancreatic mass, s/p biliary stent placement at [facility].  Pt now scheduled for Robotic Assisted Whipple, possible open

## 2021-07-28 NOTE — H&P PST ADULT - ASSESSMENT
65 y/o male with hx of jaundice, dark urine and elevated LFT's.  pt found ot have pancreatic mass, s/p biliary stent placement at [facility].  Pt now scheduled for Robotic Assisted Whipple, possible open

## 2021-07-28 NOTE — H&P PST ADULT - NSANTHOSAYNRD_GEN_A_CORE
No. PRICILA screening performed.  STOP BANG Legend: 0-2 = LOW Risk; 3-4 = INTERMEDIATE Risk; 5-8 = HIGH Risk
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
## 2021-08-03 PROBLEM — Z01.818 PRE-OP TESTING: Status: ACTIVE | Noted: 2021-01-01

## 2021-08-05 PROBLEM — R42 DIZZINESS AND GIDDINESS: Chronic | Status: ACTIVE | Noted: 2021-01-01

## 2021-08-05 PROBLEM — C25.9 MALIGNANT NEOPLASM OF PANCREAS, UNSPECIFIED: Chronic | Status: ACTIVE | Noted: 2021-01-01

## 2021-08-05 PROBLEM — K21.9 GASTRO-ESOPHAGEAL REFLUX DISEASE WITHOUT ESOPHAGITIS: Chronic | Status: ACTIVE | Noted: 2021-01-01

## 2021-08-05 PROBLEM — F10.11 ALCOHOL ABUSE, IN REMISSION: Chronic | Status: ACTIVE | Noted: 2021-01-01

## 2021-08-05 PROBLEM — M51.26 OTHER INTERVERTEBRAL DISC DISPLACEMENT, LUMBAR REGION: Chronic | Status: ACTIVE | Noted: 2021-01-01

## 2021-08-05 PROBLEM — I82.409 ACUTE EMBOLISM AND THROMBOSIS OF UNSPECIFIED DEEP VEINS OF UNSPECIFIED LOWER EXTREMITY: Chronic | Status: ACTIVE | Noted: 2021-01-01

## 2021-08-05 PROBLEM — I10 ESSENTIAL (PRIMARY) HYPERTENSION: Chronic | Status: ACTIVE | Noted: 2021-01-01

## 2021-08-05 PROBLEM — R79.89 OTHER SPECIFIED ABNORMAL FINDINGS OF BLOOD CHEMISTRY: Chronic | Status: ACTIVE | Noted: 2021-01-01

## 2021-08-05 PROBLEM — D49.0 NEOPLASM OF UNSPECIFIED BEHAVIOR OF DIGESTIVE SYSTEM: Chronic | Status: ACTIVE | Noted: 2021-01-01

## 2021-08-05 PROBLEM — Z86.19 PERSONAL HISTORY OF OTHER INFECTIOUS AND PARASITIC DISEASES: Chronic | Status: ACTIVE | Noted: 2021-01-01

## 2021-08-05 NOTE — REVIEW OF SYSTEMS
[Joint Pain] : joint pain [Joint Stiffness] : joint stiffness [Negative] : Neurological [FreeTextEntry7] : see HPI

## 2021-08-05 NOTE — DISCUSSION/SUMMARY
[FreeTextEntry1] : 1. HTN: controlled, however, I explained to patient that clonidine is not and ideal BP medication. Recommend discontinuing in favor of amlodipine 5mg daily instead. Goal BP less than 130/80.\par \par 2. Pancreatic Mass: patient planning of having pancreatectomy. Recommend echocardiogram prior to procedure and once reviewed I can make further recommendations regarding the perioperative care of this patient. \par \par Follow up in 6 months.

## 2021-08-05 NOTE — ADDENDUM
[FreeTextEntry1] : Patient underwent echocardiogram 7/30/2021. Normal LV systolic function with visually estimated LV EF of 60%, mild mitral valve regurgitation.\par \par Patient may proceed with planned surgical procedure from a cardiovascular standpoint.
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
## 2021-08-05 NOTE — ASU PATIENT PROFILE, ADULT - PSH
History of biliary duct stent placement  7/3/2021 [facility]  No significant past surgical history

## 2021-08-05 NOTE — PHYSICAL EXAM
[Normal] : moves all extremities, no focal deficits, normal speech [de-identified] : No JVD, no carotid artery bruits auscultated bilaterally

## 2021-08-06 NOTE — CONSULT NOTE ADULT - ATTENDING COMMENTS
Pancreatic CA  a. s/p whipple procedure  b.  Admit to SICU  c.  Continue IVF resuscitation    At risk for malnutrition  a.  Keep NPO    HTN  a,  Start lopressor IV    GERD  a.  Restart protonix

## 2021-08-06 NOTE — OPERATIVE REPORT - NSICDXBRIEFPROCEDURE_GEN_ALL_CORE_FT
PROCEDURES:  Whipple procedure, robot-assisted, laparoscopic 06-Aug-2021 18:10:22  Saran Nolan  Exploratory laparotomy 06-Aug-2021 18:10:31  Saran Nolan

## 2021-08-06 NOTE — OPERATIVE REPORT - OPERATION/FINDINGS
Robot-assisted laparoscopic whipple, converted to open. Tumor noted to be densely adhesed to SMV, making robotic dissection unsafe to continue.
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
## 2021-08-06 NOTE — CHART NOTE - NSCHARTNOTEFT_GEN_A_CORE
Surgery Post-Op Note    Pre-Op Dx: pancreatic cancer  Procedure: Robotic converted to open whipple.   Surgeon: Dr. Johnson     SUBJECTIVE:  Pt seen and examined at the bedside. Pt w/ no complaints. Denies F/C/N/V. Pain controlled with medication.     OBJECTIVE:  Vital Signs Last 24 Hrs  T(C): 36.4 (06 Aug 2021 22:30), Max: 37.2 (06 Aug 2021 17:45)  T(F): 97.5 (06 Aug 2021 22:30), Max: 99 (06 Aug 2021 17:45)  HR: 102 (06 Aug 2021 23:00) (76 - 104)  BP: 140/98 (06 Aug 2021 22:30) (116/45 - 179/81)  BP(mean): 108 (06 Aug 2021 22:30) (55 - 108)  RR: 26 (06 Aug 2021 23:00) (15 - 47)  SpO2: 92% (06 Aug 2021 23:00) (90% - 98%)    Physical Exam:  General: NAD, resting comfortably in bed  Neuro: A/O x 3, no focal deficits  Pulmonary: Nonlabored breathing, no respiratory distress  Cardiovascular: NSR  Abdominal: soft, ATTP. distended  Incision: C/D/I   Drains: serosanguinous  Extremities: WWP    LABS:                        11.5   11.16 )-----------( 250      ( 06 Aug 2021 18:36 )             33.6     08-06    139  |  103  |  13  ----------------------------<  149<H>  4.1   |  16<L>  |  0.85    Ca    9.3      06 Aug 2021 18:36  Phos  3.7     08-06  Mg     1.60     08-06    TPro  6.6  /  Alb  4.2  /  TBili  2.6<H>  /  DBili  x   /  AST  468<H>  /  ALT  388<H>  /  AlkPhos  56  08-06    PT/INR - ( 06 Aug 2021 18:36 )   PT: 15.3 sec;   INR: 1.36 ratio         PTT - ( 06 Aug 2021 18:36 )  PTT:24.1 sec  CAPILLARY BLOOD GLUCOSE          LIVER FUNCTIONS - ( 06 Aug 2021 18:36 )  Alb: 4.2 g/dL / Pro: 6.6 g/dL / ALK PHOS: 56 U/L / ALT: 388 U/L / AST: 468 U/L / GGT: x           ABO Interpretation: A (08-06 @ 07:26)      IMAGING:    ASSESSMENT:66y Male now 4hours s/p robotic converted to open whipple. Recovering well in PACU, Will transfer to SICU for further monitoring.     PLAN:  - Whipple pathway  - Pain control: PCA  - Encourage IS  - Nausea control PRN  - Monitor vitals  - Diet: IVF/NPO  - Monitor I+Os  - OOB/Ambulate  - DVT ppx: heparin subq q8hr   - Appreciate SICU care     D Team Surgery  [id number]

## 2021-08-06 NOTE — CONSULT NOTE ADULT - ASSESSMENT
ASSESSMENT:  65 y/o male with hx HTN, GERD, pancreatic mass, s/p biliary stent placement at [facility]. Patient now s/p Robot-assisted laparoscopic whipple, converted to open. Tumor noted to be densely adhesed to SMV, making robotic dissection unsafe to continue. (8/6)    PLAN  Neurologic:   - Multimodal pain control with RTC Tylenol and PCA    Respiratory:   - Maintain O2 saturation > 92%    Cardiovascular:   - Hx: HTN; holding home amlodipine    Gastrointestinal/Nutrition:   - NPO/NGT    Renal/Genitourinary:   - c/w uribe catheter  - monitor I/Os  - replete lytes as needed    Hematologic:     Infectious Disease:   Lines/Tubes:  Endocrine:   Disposition:    ASSESSMENT:  67 y/o male with hx HTN, GERD, pancreatic mass, s/p biliary stent placement at OSH. Patient now s/p Robot-assisted laparoscopic whipple, converted to open. Tumor noted to be densely adhesed to SMV, making robotic dissection unsafe to continue. (8/6)    PLAN  Neurologic:   - Multimodal pain control with RTC Tylenol and PCA    Respiratory:   - Maintain O2 saturation > 92%    Cardiovascular:   - Hx: HTN; holding home amlodipine while NPO  - c/w Metoprolol 5 q6h   - Trend lactate; 4.8 intraop    Gastrointestinal/Nutrition:   - NPO/NGT  - c/w Protonix daily    Renal/Genitourinary:   - c/w uribe catheter  - monitor I/Os  - replete lytes as needed    Hematologic:   - Trend H/H  - SQH tomorrow for VTE prophylaxis     Infectious Disease:   - c/w Zosyn for periop prophylaxis  - Trend WBC  - Monitor fever curve     Endocrine:  - Monitor glucose on BMP; goal less than 180     Disposition:  SICU    Lines/Tubes: PIV, arterial line, uribe      ASSESSMENT:  65 y/o male with hx HTN, GERD, pancreatic mass, s/p biliary stent placement at [facility]SH. Patient now s/p Robot-assisted laparoscopic whipple, converted to open. Tumor noted to be densely adhesed to SMV, making robotic dissection unsafe to continue. (8/6)    PLAN  Neurologic:   - Multimodal pain control with RTC Tylenol and PCA    Respiratory:   - Maintain O2 saturation > 92%    Cardiovascular:   - Hx: HTN; holding home amlodipine while NPO  - c/w Metoprolol 5 q6h   - Trend lactate; 4.8 intraop    Gastrointestinal/Nutrition:   - NPO/NGT  - c/w Protonix daily  - Daily DIANA amylase    Renal/Genitourinary:   - c/w uribe catheter  - monitor I/Os  - replete lytes as needed    Hematologic:   - Trend H/H  - SQH tomorrow for VTE prophylaxis     Infectious Disease:   - c/w Zosyn for periop prophylaxis  - Trend WBC  - Monitor fever curve     Endocrine:  - Monitor glucose on BMP; goal less than 180     Disposition:  SICU    Lines/Tubes: PIV, arterial line, uribe

## 2021-08-07 NOTE — PHYSICAL THERAPY INITIAL EVALUATION ADULT - ADDITIONAL COMMENTS
Patient lives alone in apartment located on the first floor. Patient was independent in all ADL prior to admission. patient was not using assistive device prior to admission.

## 2021-08-07 NOTE — PROGRESS NOTE ADULT - SUBJECTIVE AND OBJECTIVE BOX
SICU Progress Note  =====================================================  Interval/Overnight Events:   - No acute events    HPI: 67 y/o male with hx HTN, GERD, hx of jaundice, dark urine and elevated LFT's, pt found ot have pancreatic mass, s/p biliary stent placement at [facility]. Patient now s/p Robot-assisted laparoscopic whipple, converted to open. Tumor noted to be densely adhesed to SMV, making robotic dissection unsafe to continue. 19Fr renita near anastomoses      MEDICATIONS:   --------------------------------------------------------------------------------------  Neurologic Medications  acetaminophen  IVPB .. 1000 milliGRAM(s) IV Intermittent every 6 hours  HYDROmorphone PCA (1 mG/mL) 30 milliLiter(s) PCA Continuous PCA Continuous  HYDROmorphone PCA (1 mG/mL) Rescue Clinician Bolus 0.5 milliGRAM(s) IV Push every 15 minutes PRN for Pain Scale GREATER THAN 6  morphine PF Spinal 0.2 milliGRAM(s) IntraThecal. once  ondansetron Injectable 4 milliGRAM(s) IV Push every 6 hours PRN Nausea  ondansetron Injectable 4 milliGRAM(s) IV Push every 6 hours PRN Nausea    Respiratory Medications    Cardiovascular Medications  metoprolol tartrate Injectable 5 milliGRAM(s) IV Push every 6 hours    Gastrointestinal Medications  lactated ringers. 1000 milliLiter(s) IV Continuous <Continuous>  pantoprazole  Injectable 40 milliGRAM(s) IV Push daily  potassium phosphate IVPB 15 milliMole(s) IV Intermittent once    Genitourinary Medications    Hematologic/Oncologic Medications  heparin   Injectable 5000 Unit(s) SubCutaneous every 8 hours    Antimicrobial/Immunologic Medications  piperacillin/tazobactam IVPB.. 3.375 Gram(s) IV Intermittent every 8 hours    Endocrine/Metabolic Medications    Topical/Other Medications  naloxone Injectable 0.1 milliGRAM(s) IV Push every 3 minutes PRN For ANY of the following changes in patient status:  A. RR LESS THAN 10 breaths per minute, B. Oxygen saturation LESS THAN 90%, C. Sedation score of 6    --------------------------------------------------------------------------------------    VITAL SIGNS, INS/OUTS (last 24 hours):  --------------------------------------------------------------------------------------  T(C): 36.4 (08-06-21 @ 22:30), Max: 37.2 (08-06-21 @ 17:45)  HR: 72 (08-07-21 @ 03:00) (72 - 104)  BP: 134/39 (08-07-21 @ 03:00) (116/45 - 179/81)  ABP: 105/94 (08-07-21 @ 03:00) (100/89 - 199/187)  ABP(mean): 99 (08-07-21 @ 03:00) (63 - 270)  RR: 17 (08-07-21 @ 03:00) (15 - 47)  SpO2: 95% (08-07-21 @ 03:00) (90% - 98%)  Wt(kg): --  CVP(mm Hg): --      08-06 @ 07:01  -  08-07 @ 03:59  --------------------------------------------------------  IN:    IV PiggyBack: 325 mL    Lactated Ringers: 600 mL  Total IN: 925 mL    OUT:    Bulb (mL): 135 mL    Indwelling Catheter - Urethral (mL): 1240 mL    Nasogastric/Oral tube (mL): 400 mL  Total OUT: 1775 mL    Total NET: -850 mL        --------------------------------------------------------------------------------------    METABOLIC/FLUIDS/ELECTROLYTES:   lactated ringers. 1000 milliLiter(s) IV Continuous <Continuous>  potassium phosphate IVPB 15 milliMole(s) IV Intermittent once    EXAM:  General/Neuro  GCS: 15  Exam: Normal, NAD, alert, oriented x 3, no focal deficits    Respiratory  Exam: Lungs clear to auscultation, Normal expansion/effort.    Cardiovascular  Exam: S1, S2.  Regular rate and rhythm.   Cardiac Rhythm: Normal Sinus Rhythm    GI  Exam: Abdomen soft, Non-tender, Non-distended.  Nasogastric tube in place. DIANA: serosanguinous  Wound:  Prevena Vac  Current Diet:  NPO    Extremities  Exam: Extremities warm, pink, well-perfused.        Derm:  Exam: Good skin turgor, no skin breakdown.      :   Exam: [place] catheter in place.     Tubes/Lines/Drains:  [x] Peripheral IV  [] Central Venous Line		[] R	[] L	[] IJ	[] Fem	[] SC	Date Placed:   [] Arterial Line		[] R	[] L	[] Fem	[] Rad	[] Ax	Date Placed:   [] PICC:			[] Midline		[] Mediport  [] Urinary Catheter	Date Placed:   [x] Necessity of urinary, arterial, and venous catheters discussed    LABS:   --------------------------------------------------------------------------------------  CBC (08-07 @ 03:01)                              11.9<L>                         11.71<H>  )----------------(  224        --    % Neutrophils, --    % Lymphocytes, ANC: --                                  34.7<L>              CBC (08-06 @ 18:36)                              11.5<L>                         11.16<H>  )----------------(  250        --    % Neutrophils, --    % Lymphocytes, ANC: --                                  33.6<L>                BMP (08-07 @ 03:01)             138     |  101     |  12    		Ca++ --      Ca 8.8                ---------------------------------( 157<H>		Mg 2.00               3.7     |  22      |  0.73  			Ph 1.9<L>  BMP (08-06 @ 18:36)             139     |  103     |  13    		Ca++ --      Ca 9.3                ---------------------------------( 149<H>		Mg 1.60               4.1     |  16<L>   |  0.85  			Ph 3.7       LFTs (08-07 @ 03:01)      TPro 6.1 / Alb 3.9 / TBili 2.1<H> / DBili -- / <H> / <H> / AlkPhos 56  LFTs (08-06 @ 18:36)      TPro 6.6 / Alb 4.2 / TBili 2.6<H> / DBili -- / <H> / <H> / AlkPhos 56    Coags (08-07 @ 03:01)  aPTT 28.9 / INR 1.37<H> / PT 15.5<H>  Coags (08-06 @ 18:36)  aPTT 24.1 / INR 1.36<H> / PT 15.3<H>    ABG (08-06 @ 20:14)     7.35 / 35 / 110<H> / 19<L> / -5.6<L> / 99.0<H>%     Lactate:  6.5<HH>   ABG (08-06 @ 17:17)     7.27<L> / 45 / 298<H> / 21 / -6.0<L> / 99.8<H>%     Lactate:           --------------------------------------------------------------------------------------    --------------------------------------------------------------------------------------   SICU Progress Note  =====================================================  Interval/Overnight Events:   Interval/Overnight Events:   - DCd a line  - Is listed for floor     HPI: 65 y/o male with hx HTN, GERD, hx of jaundice, dark urine and elevated LFT's, pt found ot have pancreatic mass, s/p biliary stent placement at [facility]. Patient now s/p Robot-assisted laparoscopic whipple, converted to open. Tumor noted to be densely adhesed to SMV, making robotic dissection unsafe to continue. 19Fr renita near anastomoses      MEDICATIONS:   --------------------------------------------------------------------------------------  Neurologic Medications  acetaminophen  IVPB .. 1000 milliGRAM(s) IV Intermittent every 6 hours  HYDROmorphone PCA (1 mG/mL) 30 milliLiter(s) PCA Continuous PCA Continuous  HYDROmorphone PCA (1 mG/mL) Rescue Clinician Bolus 0.5 milliGRAM(s) IV Push every 15 minutes PRN for Pain Scale GREATER THAN 6  morphine PF Spinal 0.2 milliGRAM(s) IntraThecal. once  ondansetron Injectable 4 milliGRAM(s) IV Push every 6 hours PRN Nausea  ondansetron Injectable 4 milliGRAM(s) IV Push every 6 hours PRN Nausea    Respiratory Medications    Cardiovascular Medications  metoprolol tartrate Injectable 5 milliGRAM(s) IV Push every 6 hours    Gastrointestinal Medications  lactated ringers. 1000 milliLiter(s) IV Continuous <Continuous>  pantoprazole  Injectable 40 milliGRAM(s) IV Push daily  potassium phosphate IVPB 15 milliMole(s) IV Intermittent once    Genitourinary Medications    Hematologic/Oncologic Medications  heparin   Injectable 5000 Unit(s) SubCutaneous every 8 hours    Antimicrobial/Immunologic Medications  piperacillin/tazobactam IVPB.. 3.375 Gram(s) IV Intermittent every 8 hours    Endocrine/Metabolic Medications    Topical/Other Medications  naloxone Injectable 0.1 milliGRAM(s) IV Push every 3 minutes PRN For ANY of the following changes in patient status:  A. RR LESS THAN 10 breaths per minute, B. Oxygen saturation LESS THAN 90%, C. Sedation score of 6    --------------------------------------------------------------------------------------    VITAL SIGNS, INS/OUTS (last 24 hours):  --------------------------------------------------------------------------------------  T(C): 36.4 (08-06-21 @ 22:30), Max: 37.2 (08-06-21 @ 17:45)  HR: 72 (08-07-21 @ 03:00) (72 - 104)  BP: 134/39 (08-07-21 @ 03:00) (116/45 - 179/81)  ABP: 105/94 (08-07-21 @ 03:00) (100/89 - 199/187)  ABP(mean): 99 (08-07-21 @ 03:00) (63 - 270)  RR: 17 (08-07-21 @ 03:00) (15 - 47)  SpO2: 95% (08-07-21 @ 03:00) (90% - 98%)  Wt(kg): --  CVP(mm Hg): --      08-06 @ 07:01  -  08-07 @ 03:59  --------------------------------------------------------  IN:    IV PiggyBack: 325 mL    Lactated Ringers: 600 mL  Total IN: 925 mL    OUT:    Bulb (mL): 135 mL    Indwelling Catheter - Urethral (mL): 1240 mL    Nasogastric/Oral tube (mL): 400 mL  Total OUT: 1775 mL    Total NET: -850 mL        --------------------------------------------------------------------------------------    METABOLIC/FLUIDS/ELECTROLYTES:   lactated ringers. 1000 milliLiter(s) IV Continuous <Continuous>  potassium phosphate IVPB 15 milliMole(s) IV Intermittent once    EXAM:  General/Neuro  GCS: 15  Exam: Normal, NAD, alert, oriented x 3, no focal deficits    Respiratory  Exam: Lungs clear to auscultation, Normal expansion/effort.    Cardiovascular  Exam: S1, S2.  Regular rate and rhythm.   Cardiac Rhythm: Normal Sinus Rhythm    GI  Exam: Abdomen soft, Non-tender, Non-distended.  Nasogastric tube in place. DIANA: serosanguinous  Wound:  Prevena Vac  Current Diet:  NPO    Extremities  Exam: Extremities warm, pink, well-perfused.        Derm:  Exam: Good skin turgor, no skin breakdown.      :   Exam: [place] catheter in place.     Tubes/Lines/Drains:  [x] Peripheral IV  [] Central Venous Line		[] R	[] L	[] IJ	[] Fem	[] SC	Date Placed:   [] Arterial Line		[] R	[] L	[] Fem	[] Rad	[] Ax	Date Placed:   [] PICC:			[] Midline		[] Mediport  [] Urinary Catheter	Date Placed:   [x] Necessity of urinary, arterial, and venous catheters discussed    LABS:   --------------------------------------------------------------------------------------  CBC (08-07 @ 03:01)                              11.9<L>                         11.71<H>  )----------------(  224        --    % Neutrophils, --    % Lymphocytes, ANC: --                                  34.7<L>              CBC (08-06 @ 18:36)                              11.5<L>                         11.16<H>  )----------------(  250        --    % Neutrophils, --    % Lymphocytes, ANC: --                                  33.6<L>                BMP (08-07 @ 03:01)             138     |  101     |  12    		Ca++ --      Ca 8.8                ---------------------------------( 157<H>		Mg 2.00               3.7     |  22      |  0.73  			Ph 1.9<L>  BMP (08-06 @ 18:36)             139     |  103     |  13    		Ca++ --      Ca 9.3                ---------------------------------( 149<H>		Mg 1.60               4.1     |  16<L>   |  0.85  			Ph 3.7       LFTs (08-07 @ 03:01)      TPro 6.1 / Alb 3.9 / TBili 2.1<H> / DBili -- / <H> / <H> / AlkPhos 56  LFTs (08-06 @ 18:36)      TPro 6.6 / Alb 4.2 / TBili 2.6<H> / DBili -- / <H> / <H> / AlkPhos 56    Coags (08-07 @ 03:01)  aPTT 28.9 / INR 1.37<H> / PT 15.5<H>  Coags (08-06 @ 18:36)  aPTT 24.1 / INR 1.36<H> / PT 15.3<H>    ABG (08-06 @ 20:14)     7.35 / 35 / 110<H> / 19<L> / -5.6<L> / 99.0<H>%     Lactate:  6.5<HH>   ABG (08-06 @ 17:17)     7.27<L> / 45 / 298<H> / 21 / -6.0<L> / 99.8<H>%     Lactate:           --------------------------------------------------------------------------------------    --------------------------------------------------------------------------------------

## 2021-08-07 NOTE — PROGRESS NOTE ADULT - SUBJECTIVE AND OBJECTIVE BOX
ANESTHESIA POSTOP CHECK    66y Male POSTOP DAY 1 S/P Deon Cantu    Vital Signs Last 24 Hrs  T(C): 36.3 (07 Aug 2021 16:00), Max: 37.1 (07 Aug 2021 12:00)  T(F): 97.4 (07 Aug 2021 16:00), Max: 98.7 (07 Aug 2021 12:00)  HR: 75 (07 Aug 2021 17:00) (56 - 104)  BP: 141/81 (07 Aug 2021 17:00) (120/74 - 179/81)  BP(mean): 96 (07 Aug 2021 17:00) (55 - 147)  RR: 24 (07 Aug 2021 17:00) (15 - 47)  SpO2: 91% (07 Aug 2021 17:00) (90% - 98%)  I&O's Summary    06 Aug 2021 07:01  -  07 Aug 2021 07:00  --------------------------------------------------------  IN: 1512.5 mL / OUT: 2235 mL / NET: -722.5 mL    07 Aug 2021 07:01  -  07 Aug 2021 18:13  --------------------------------------------------------  IN: 900 mL / OUT: 1645 mL / NET: -745 mL        [X ] NO APPARENT ANESTHESIA COMPLICATIONS      Comments:

## 2021-08-07 NOTE — PROGRESS NOTE ADULT - SUBJECTIVE AND OBJECTIVE BOX
Anesthesia Pain Management Service    SUBJECTIVE: Patient is doing well with IV PCA and no significant problems reported.  Denies any headache or numbness/tingling of lower extremities.  Able to move bilateral lower extremities.  Patient complaining of nausea since the surgery.    Pain Scale Score	At rest: __0/10_ 	With Activity: ___ 	[X ] Refer to charted pain scores        THERAPY:  Patient had single shot spinal morphine 0.2mg yesterday.    [ ] IV PCA Morphine		[ ] 5 mg/mL	[ ] 1 mg/mL  [X ] IV PCA Hydromorphone	[ ] 5 mg/mL	[X ] 1 mg/mL  [ ] IV PCA Fentanyl		[ ] 50 micrograms/mL    Demand dose __0.2_ lockout __6_ (minutes) Continuous Rate _0__ Total: _0__  mg used (in past 24 hours)      MEDICATIONS  (STANDING):  acetaminophen  IVPB .. 1000 milliGRAM(s) IV Intermittent every 6 hours  heparin   Injectable 5000 Unit(s) SubCutaneous every 8 hours  HYDROmorphone PCA (1 mG/mL) 30 milliLiter(s) PCA Continuous PCA Continuous  lactated ringers. 1000 milliLiter(s) (100 mL/Hr) IV Continuous <Continuous>  metoclopramide Injectable 10 milliGRAM(s) IV Push once  metoprolol tartrate Injectable 5 milliGRAM(s) IV Push every 6 hours  morphine PF Spinal 0.2 milliGRAM(s) IntraThecal. once  pantoprazole  Injectable 40 milliGRAM(s) IV Push daily  piperacillin/tazobactam IVPB.. 3.375 Gram(s) IV Intermittent every 8 hours    MEDICATIONS  (PRN):  HYDROmorphone PCA (1 mG/mL) Rescue Clinician Bolus 0.5 milliGRAM(s) IV Push every 15 minutes PRN for Pain Scale GREATER THAN 6  naloxone Injectable 0.1 milliGRAM(s) IV Push every 3 minutes PRN For ANY of the following changes in patient status:  A. RR LESS THAN 10 breaths per minute, B. Oxygen saturation LESS THAN 90%, C. Sedation score of 6  ondansetron Injectable 4 milliGRAM(s) IV Push every 6 hours PRN Nausea  ondansetron Injectable 4 milliGRAM(s) IV Push every 6 hours PRN Nausea      OBJECTIVE:  Patient is sitting up in chair NPO with NG tube, and is experiencing nausea and dry heaving.      Sedation Score:	[ X] Alert	 [ ] Drowsy 	[ ] Arousable	[ ] Asleep	[ ] Unresponsive    Side Effects:	[ ] None	[x ] Nausea, dry heaving 	[x ] Vomiting	[ ] Pruritus  		[ ] Other:    Vital Signs Last 24 Hrs  T(C): 36.9 (07 Aug 2021 08:00), Max: 37.2 (06 Aug 2021 17:45)  T(F): 98.4 (07 Aug 2021 08:00), Max: 99 (06 Aug 2021 17:45)  HR: 78 (07 Aug 2021 10:00) (56 - 104)  BP: 130/78 (07 Aug 2021 10:00) (116/45 - 179/81)  BP(mean): 91 (07 Aug 2021 10:00) (55 - 108)  RR: 21 (07 Aug 2021 10:00) (15 - 47)  SpO2: 94% (07 Aug 2021 10:00) (90% - 98%)    ASSESSMENT/ PLAN    Therapy to  be:	[ X] Continue   [ ] Discontinued   [ ] Change to prn Analgesics    Documentation and Verification of current medications:   [X] Done	[ ] Not done, not elligible    Comments: Patient did not use the IV PCA at all.  As per RN, Zofran given around 7 this morning.   Discussed patient with team and asked to try another antiemetic medication.  Recommend non-opioid adjuvant analgesics to be used when possible and when allowed by primary surgical team.

## 2021-08-07 NOTE — PHYSICAL THERAPY INITIAL EVALUATION ADULT - PERTINENT HX OF CURRENT PROBLEM, REHAB EVAL
65 y/o male with hx of jaundice, dark urine and elevated LFT's. pt found ot have pancreatic mass, s/p biliary stent placement at [facility].  Pt now scheduled for Robotic Assisted Whipple, possible open

## 2021-08-07 NOTE — PROGRESS NOTE ADULT - ATTENDING COMMENTS
Pancreatic CA  a. s/p whipple procedure  b.  Start HSQ fro DVT prophylaxis  c.  Await DIANA amylase  d.  On zosyn  e.  Transfer to floor    At risk for malnutrition  a.  Pt nauseous, may give prn Reglan on top of Zofran  b.  Keep NPO  c.  NGT in place    Hypokalemia  a.  Replete K    HTN  a.  On lopressor

## 2021-08-07 NOTE — PROGRESS NOTE ADULT - ASSESSMENT
67 y/o male with hx HTN, GERD, pancreatic mass, s/p biliary stent placement [facility]. Patient now s/p Robot-assisted laparoscopic whipple, converted to open. Tumor noted to be densely adhesed to SMV, making robotic dissection unsafe to continue. (8/6)      Plan:        D team surgery  #[id number]  67 y/o male with hx HTN, GERD, pancreatic mass, s/p biliary stent placement at [facility]. Patient now s/p Robot-assisted laparoscopic whipple, converted to open. Tumor noted to be densely adhesed to SMV, making robotic dissection unsafe to continue. (8/6)    Plan:    - Continue to appreciate excellent recommendations per SICU    SICU Plan:    Neurologic:   - Multimodal pain control with RTC Tylenol and PCA    Respiratory:   - Maintain O2 saturation > 92%    Cardiovascular:   - Hx: HTN; holding home amlodipine while NPO  - c/w Metoprolol 5 q6h   - Trend lactate; 4.8 intraop    Gastrointestinal/Nutrition:   - NPO/NGT  - c/w Protonix daily  - Daily DIANA amylase    Renal/Genitourinary:   - c/w uribe catheter  - monitor I/Os  - replete lytes as needed    Hematologic:   - Trend H/H  - SQH tomorrow for VTE prophylaxis     Infectious Disease:   - c/w Zosyn for periop prophylaxis  - Trend WBC  - Monitor fever curve     Endocrine:  - Monitor glucose on BMP; goal less than 180     Disposition:  SICU    Lines/Tubes: PIV, arterial line, uribe         D team surgery  #[id number]

## 2021-08-08 NOTE — PROGRESS NOTE ADULT - SUBJECTIVE AND OBJECTIVE BOX
SICU Progress Note  =====================================================  Interval/Overnight Events:   - DCd a line  - listed for floor     HPI: 67 y/o male with hx HTN, GERD, hx of jaundice, dark urine and elevated LFT's, pt found ot have pancreatic mass, s/p biliary stent placement at [facility]. Patient now s/p Robot-assisted laparoscopic whipple, converted to open. Tumor noted to be densely adhesed to SMV, making robotic dissection unsafe to continue. 19Fr renita near anastomoses      MEDICATIONS:   --------------------------------------------------------------------------------------  Neurologic Medications  acetaminophen  IVPB .. 1000 milliGRAM(s) IV Intermittent every 6 hours  HYDROmorphone PCA (1 mG/mL) 30 milliLiter(s) PCA Continuous PCA Continuous  HYDROmorphone PCA (1 mG/mL) Rescue Clinician Bolus 0.5 milliGRAM(s) IV Push every 15 minutes PRN for Pain Scale GREATER THAN 6  morphine PF Spinal 0.2 milliGRAM(s) IntraThecal. once  ondansetron Injectable 4 milliGRAM(s) IV Push every 6 hours PRN Nausea  ondansetron Injectable 4 milliGRAM(s) IV Push every 6 hours PRN Nausea    Respiratory Medications    Cardiovascular Medications  metoprolol tartrate Injectable 5 milliGRAM(s) IV Push every 6 hours    Gastrointestinal Medications  lactated ringers. 1000 milliLiter(s) IV Continuous <Continuous>  pantoprazole  Injectable 40 milliGRAM(s) IV Push daily  potassium phosphate IVPB 15 milliMole(s) IV Intermittent once    Genitourinary Medications    Hematologic/Oncologic Medications  heparin   Injectable 5000 Unit(s) SubCutaneous every 8 hours    Antimicrobial/Immunologic Medications  piperacillin/tazobactam IVPB.. 3.375 Gram(s) IV Intermittent every 8 hours    Endocrine/Metabolic Medications  MEDICATIONS  (STANDING):  heparin   Injectable 5000 Unit(s) SubCutaneous every 8 hours  HYDROmorphone PCA (1 mG/mL) 30 milliLiter(s) PCA Continuous PCA Continuous  lactated ringers. 1000 milliLiter(s) (100 mL/Hr) IV Continuous <Continuous>  metoprolol tartrate Injectable 5 milliGRAM(s) IV Push every 6 hours  morphine PF Spinal 0.2 mill  I&O's Detail    ICU Vital Signs Last 24 Hrs  T(C): 37.2 (08 Aug 2021 04:00), Max: 37.2 (08 Aug 2021 04:00)  T(F): 98.9 (08 Aug 2021 04:00), Max: 98.9 (08 Aug 2021 04:00)  HR: 72 (08 Aug 2021 04:00) (56 - 92)  BP: 139/76 (08 Aug 2021 04:00) (120/74 - 165/80)  BP(mean): 92 (08 Aug 2021 04:00) (84 - 147)  ABP: 119/106 (07 Aug 2021 16:00) (77/70 - 138/126)  ABP(mean): 111 (07 Aug 2021 16:00) (73 - 131)  RR: 16 (08 Aug 2021 04:00) (16 - 27)  SpO2: 92% (08 Aug 2021 04:00) (91% - 96%)      06 Aug 2021 07:01  -  07 Aug 2021 07:00  --------------------------------------------------------  IN:    IV PiggyBack: 512.5 mL    Lactated Ringers: 1000 mL  Total IN: 1512.5 mL    OUT:    Bulb (mL): 135 mL    Indwelling Catheter - Urethral (mL): 1500 mL    Nasogastric/Oral tube (mL): 600 mL  Total OUT: 2235 mL    Total NET: -722.5 mL      07 Aug 2021 07:01  -  08 Aug 2021 06:50  --------------------------------------------------------  IN:    IV PiggyBack: 100 mL    Lactated Ringers: 1500 mL  Total IN: 1600 mL    OUT:    Bulb (mL): 65 mL    Indwelling Catheter - Urethral (mL): 1455 mL    Nasogastric/Oral tube (mL): 1100 mL  Total OUT: 2620 mL    Total NET: -1020 mL      iGRAM(s) IntraThecal. once  pantoprazole  Injectable 40 milliGRAM(s) IV Push daily  piperacillin/tazobactam IVPB.. 3.375 Gram(s) IV Intermittent every 8 hours    MEDICATIONS  (PRN):  HYDROmorphone PCA (1 mG/mL) Rescue Clinician Bolus 0.5 milliGRAM(s) IV Push every 15 minutes PRN for Pain Scale GREATER THAN 6  naloxone Injectable 0.1 milliGRAM(s) IV Push every 3 minutes PRN For ANY of the following changes in patient status:  A. RR LESS THAN 10 breaths per minute, B. Oxygen saturation LESS THAN 90%, C. Sedation score of 6  ondansetron Injectable 4 milliGRAM(s) IV Push every 6 hours PRN Nausea  ondansetron Injectable 4 milliGRAM(s) IV Push every 6 hours PRN Nausea    Topical/Other Medications  naloxone Injectable 0.1 milliGRAM(s) IV Push every 3 minutes PRN For ANY of the following changes in patient status:  A. RR LESS THAN 10 breaths per minute, B. Oxygen saturation LESS THAN 90%, C. Sedation score of 6    -------------------------------------------------------------------------------------      EXAM:  General/Neuro  GCS: 15  Exam: Normal, NAD, alert, oriented x 3, no focal deficits    Respiratory  Exam: Lungs clear to auscultation, Normal expansion/effort.    Cardiovascular  Exam: S1, S2.  Regular rate and rhythm.   Cardiac Rhythm: Normal Sinus Rhythm    GI  Exam: Abdomen soft, Non-tender, Non-distended.  Nasogastric tube in place. DIANA: serosanguinous  Wound:  Prevena Vac  Current Diet:  NPO    Extremities  Exam: Extremities warm, pink, well-perfused.        Derm:  Exam: Good skin turgor, no skin breakdown.      :   Exam: [place] catheter in place.     Tubes/Lines/Drains:  [x] Peripheral IV  [] Central Venous Line		[] R	[] L	[] IJ	[] Fem	[] SC	Date Placed:   [] Arterial Line		[] R	[] L	[] Fem	[] Rad	[] Ax	Date Placed:   [] PICC:			[] Midline		[] Mediport  [] Urinary Catheter	Date Placed:   [x] Necessity of urinary, arterial, and venous catheters discussed    LABS:   --------------------------------------------------------------------------------------  CBC (08-07 @ 03:01)                              11.9<L>                         11.71<H>  )----------------(  224        --    % Neutrophils, --    % Lymphocytes, ANC: --                                  34.7<L>              CBC (08-06 @ 18:36)                              11.5<L>                         11.16<H>  )----------------(  250        --    % Neutrophils, --    % Lymphocytes, ANC: --                                  33.6<L>                BMP (08-07 @ 03:01)             138     |  101     |  12    		Ca++ --      Ca 8.8                ---------------------------------( 157<H>		Mg 2.00               3.7     |  22      |  0.73  			Ph 1.9<L>  BMP (08-06 @ 18:36)             139     |  103     |  13    		Ca++ --      Ca 9.3                ---------------------------------( 149<H>		Mg 1.60               4.1     |  16<L>   |  0.85  			Ph 3.7       LFTs (08-07 @ 03:01)      TPro 6.1 / Alb 3.9 / TBili 2.1<H> / DBili -- / <H> / <H> / AlkPhos 56  LFTs (08-06 @ 18:36)      TPro 6.6 / Alb 4.2 / TBili 2.6<H> / DBili -- / <H> / <H> / AlkPhos 56    Coags (08-07 @ 03:01)  aPTT 28.9 / INR 1.37<H> / PT 15.5<H>  Coags (08-06 @ 18:36)  aPTT 24.1 / INR 1.36<H> / PT 15.3<H>    ABG (08-06 @ 20:14)     7.35 / 35 / 110<H> / 19<L> / -5.6<L> / 99.0<H>%     Lactate:  6.5<HH>   ABG (08-06 @ 17:17)     7.27<L> / 45 / 298<H> / 21 / -6.0<L> / 99.8<H>%     Lactate:           --------------------------------------------------------------------------------------    --------------------------------------------------------------------------------------

## 2021-08-08 NOTE — PROGRESS NOTE ADULT - SUBJECTIVE AND OBJECTIVE BOX
TEAM Surgery Progress Note    INTERVAL EVENTS:   - No acute events overnight.    SUBJECTIVE:   Patient transferred to the floor from SICU today.   Patient seen by Dr Diaz.  Patient seen and examined at bedside with surgical team and Dr Diaz, Patient with favorable evolution. NG tube continuing to put out, patient complained of some nausea and dryness this AM.    OBJECTIVE:    Vital Signs Last 24 Hrs  T(C): 36.7 (08 Aug 2021 09:00), Max: 37.2 (08 Aug 2021 04:00)  T(F): 98.1 (08 Aug 2021 09:00), Max: 98.9 (08 Aug 2021 04:00)  HR: 70 (08 Aug 2021 09:00) (70 - 92)  BP: 161/82 (08 Aug 2021 09:00) (134/78 - 166/83)  BP(mean): 92 (08 Aug 2021 04:00) (87 - 147)  RR: 18 (08 Aug 2021 09:00) (16 - 27)  SpO2: 93% (08 Aug 2021 09:00) (91% - 95%)    I&O's Detail    07 Aug 2021 07:01  -  08 Aug 2021 07:00  --------------------------------------------------------  IN:    IV PiggyBack: 100 mL    Lactated Ringers: 1700 mL  Total IN: 1800 mL    OUT:    Bulb (mL): 80 mL    Indwelling Catheter - Urethral (mL): 1655 mL    Nasogastric/Oral tube (mL): 1200 mL  Total OUT: 2935 mL    Total NET: -1135 mL      08 Aug 2021 07:01  -  08 Aug 2021 11:13  --------------------------------------------------------  IN:    Lactated Ringers: 100 mL  Total IN: 100 mL    OUT:    Bulb (mL): 35 mL    Indwelling Catheter - Urethral (mL): 335 mL  Total OUT: 370 mL    Total NET: -270 mL                MEDICATIONS  (STANDING):  acetaminophen  IVPB .. 1000 milliGRAM(s) IV Intermittent every 6 hours  heparin   Injectable 5000 Unit(s) SubCutaneous every 8 hours  HYDROmorphone PCA (1 mG/mL) 30 milliLiter(s) PCA Continuous PCA Continuous  lactated ringers. 1000 milliLiter(s) (100 mL/Hr) IV Continuous <Continuous>  metoprolol tartrate Injectable 5 milliGRAM(s) IV Push every 6 hours  morphine PF Spinal 0.2 milliGRAM(s) IntraThecal. once  pantoprazole  Injectable 40 milliGRAM(s) IV Push daily  piperacillin/tazobactam IVPB.. 3.375 Gram(s) IV Intermittent every 8 hours    MEDICATIONS  (PRN):  HYDROmorphone PCA (1 mG/mL) Rescue Clinician Bolus 0.5 milliGRAM(s) IV Push every 15 minutes PRN for Pain Scale GREATER THAN 6  naloxone Injectable 0.1 milliGRAM(s) IV Push every 3 minutes PRN For ANY of the following changes in patient status:  A. RR LESS THAN 10 breaths per minute, B. Oxygen saturation LESS THAN 90%, C. Sedation score of 6  ondansetron Injectable 4 milliGRAM(s) IV Push every 6 hours PRN Nausea  ondansetron Injectable 4 milliGRAM(s) IV Push every 6 hours PRN Nausea      PHYSICAL EXAM:  Constitutional: A&Ox3, NAD, NGT in place  Respiratory: Unlabored breathing  Abdomen: Soft, nondistended, appropriately tender. No rebound or guarding. Dressing C/D/I  Extremities: WWP, RICHTER spontaneously    LABS:                        11.7   13.31 )-----------( 243      ( 08 Aug 2021 04:53 )             33.8     08-08    135  |  99  |  11  ----------------------------<  129<H>  3.4<L>   |  25  |  0.66    Ca    8.4      08 Aug 2021 04:53  Phos  1.2     08-08  Mg     1.80     08-08    TPro  5.8<L>  /  Alb  3.4  /  TBili  1.8<H>  /  DBili  x   /  AST  449<H>  /  ALT  549<H>  /  AlkPhos  54  08-08    PT/INR - ( 08 Aug 2021 04:53 )   PT: 15.3 sec;   INR: 1.36 ratio         PTT - ( 08 Aug 2021 04:53 )  PTT:28.7 sec          Culture - Other (collected 07 Aug 2021 02:50)  Source: .Other 1 BILE  Preliminary Report (08 Aug 2021 10:58):    Few Gram Negative Rods

## 2021-08-08 NOTE — PROGRESS NOTE ADULT - SUBJECTIVE AND OBJECTIVE BOX
Anesthesia Pain Management Service    SUBJECTIVE: Patient is doing well with IV PCA and no significant problems reported.    Pain Scale Score	At rest: _0/10__ 	With Activity: ___ 	[X ] Refer to charted pain scores    THERAPY:    [ ] IV PCA Morphine		[ ] 5 mg/mL	[ ] 1 mg/mL  [X ] IV PCA Hydromorphone	[ ] 5 mg/mL	[X ] 1 mg/mL  [ ] IV PCA Fentanyl		[ ] 50 micrograms/mL    Demand dose __0.2_ lockout __6_ (minutes) Continuous Rate _0__ Total: _3.6__  mg used (in past 24 hours)      MEDICATIONS  (STANDING):  heparin   Injectable 5000 Unit(s) SubCutaneous every 8 hours  HYDROmorphone PCA (1 mG/mL) 30 milliLiter(s) PCA Continuous PCA Continuous  lactated ringers. 1000 milliLiter(s) (100 mL/Hr) IV Continuous <Continuous>  metoprolol tartrate Injectable 5 milliGRAM(s) IV Push every 6 hours  morphine PF Spinal 0.2 milliGRAM(s) IntraThecal. once  pantoprazole  Injectable 40 milliGRAM(s) IV Push daily  piperacillin/tazobactam IVPB.. 3.375 Gram(s) IV Intermittent every 8 hours  potassium chloride    Tablet ER 10 milliEquivalent(s) Oral daily    MEDICATIONS  (PRN):  HYDROmorphone PCA (1 mG/mL) Rescue Clinician Bolus 0.5 milliGRAM(s) IV Push every 15 minutes PRN for Pain Scale GREATER THAN 6  naloxone Injectable 0.1 milliGRAM(s) IV Push every 3 minutes PRN For ANY of the following changes in patient status:  A. RR LESS THAN 10 breaths per minute, B. Oxygen saturation LESS THAN 90%, C. Sedation score of 6  ondansetron Injectable 4 milliGRAM(s) IV Push every 6 hours PRN Nausea  ondansetron Injectable 4 milliGRAM(s) IV Push every 6 hours PRN Nausea      OBJECTIVE:  Patient is sitting up in bed, NPO with NGT.    Sedation Score:	[ X] Alert	 [ ] Drowsy 	[ ] Arousable	[ ] Asleep	[ ] Unresponsive    Side Effects:	[X ] None	[ ] Nausea	[ ] Vomiting	[ ] Pruritus  		[ ] Other:    Vital Signs Last 24 Hrs  T(C): 36.7 (08 Aug 2021 09:00), Max: 37.2 (08 Aug 2021 04:00)  T(F): 98.1 (08 Aug 2021 09:00), Max: 98.9 (08 Aug 2021 04:00)  HR: 70 (08 Aug 2021 09:00) (66 - 92)  BP: 161/82 (08 Aug 2021 09:00) (120/74 - 166/83)  BP(mean): 92 (08 Aug 2021 04:00) (84 - 147)  RR: 18 (08 Aug 2021 09:00) (16 - 27)  SpO2: 93% (08 Aug 2021 09:00) (91% - 95%)    ASSESSMENT/ PLAN    Therapy to  be:	[ X] Continue   [ ] Discontinued   [ ] Change to prn Analgesics    Documentation and Verification of current medications:   [X] Done	[ ] Not done, not elligible    Comments: Will continue with IV Dilaudid PCA.  Recommend non-opioid adjuvant analgesics to be used when possible and when allowed by primary surgical team.

## 2021-08-08 NOTE — PROGRESS NOTE ADULT - ASSESSMENT
67 y/o male with hx HTN, GERD, pancreatic mass, s/p biliary stent placement at [facility]. Patient now s/p Robot-assisted laparoscopic whipple, converted to open. Tumor noted to be densely adhesed to SMV, making robotic dissection unsafe to continue. (8/6)      Interval/Overnight Events:   -DCd a line  - listed for floor     PLAN  Neurologic:   - Multimodal pain control with RTC Tylenol and PCA    Respiratory:   - Maintain O2 saturation > 92%    Cardiovascular:   - Hx: HTN; holding home amlodipine while NPO  - c/w Metoprolol 5 q6h    - Trend lactate; 4.8 intraop, downtrended to 1.8    Gastrointestinal/Nutrition:   - NPO/NGT  - c/w Protonix daily  - Daily DIANA amylase    Renal/Genitourinary:   - c/w uribe catheter  - monitor I/Os, good UOP post op  - replete lytes as needed    Hematologic:   - Trend H/H  - SQH for VTE prophylaxis     Infectious Disease:   - c/w Zosyn for periop prophylaxis  - Trend WBC  - Monitor fever curve     Endocrine:  - Monitor glucose on BMP; goal less than 180     Disposition: Listed for floors    Lines/Tubes: PIV, uribe

## 2021-08-08 NOTE — PROGRESS NOTE ADULT - ASSESSMENT
67 y/o male with hx HTN, GERD, pancreatic mass, s/p biliary stent placement at [facility]. Patient now s/p Robot-assisted laparoscopic whipple, converted to open. Tumor noted to be densely adhesed to SMV, making robotic dissection unsafe to continue. (8/6)    Plan:    - DC uribe  - OOB  - monitor NGT output        D team surgery  #[id number]

## 2021-08-08 NOTE — PROGRESS NOTE ADULT - SUBJECTIVE AND OBJECTIVE BOX
Anesthesia Pain Management Service    SUBJECTIVE: Patient is doing well with IV PCA and no significant problems reported.    Pain Scale Score	At rest: _0/10__ 	With Activity: ___ 	[X ] Refer to charted pain scores    THERAPY:    [ ] IV PCA Morphine		[ ] 5 mg/mL	[ ] 1 mg/mL  [X ] IV PCA Hydromorphone	[ ] 5 mg/mL	[X ] 1 mg/mL  [ ] IV PCA Fentanyl		[ ] 50 micrograms/mL    Demand dose __0.2_ lockout __6_ (minutes) Continuous Rate _0__ Total: _3.6__  mg used (in past 24 hours)      MEDICATIONS  (STANDING):  heparin   Injectable 5000 Unit(s) SubCutaneous every 8 hours  HYDROmorphone PCA (1 mG/mL) 30 milliLiter(s) PCA Continuous PCA Continuous  lactated ringers. 1000 milliLiter(s) (100 mL/Hr) IV Continuous <Continuous>  metoprolol tartrate Injectable 5 milliGRAM(s) IV Push every 6 hours  morphine PF Spinal 0.2 milliGRAM(s) IntraThecal. once  pantoprazole  Injectable 40 milliGRAM(s) IV Push daily  piperacillin/tazobactam IVPB.. 3.375 Gram(s) IV Intermittent every 8 hours  potassium chloride    Tablet ER 10 milliEquivalent(s) Oral daily    MEDICATIONS  (PRN):  HYDROmorphone PCA (1 mG/mL) Rescue Clinician Bolus 0.5 milliGRAM(s) IV Push every 15 minutes PRN for Pain Scale GREATER THAN 6  naloxone Injectable 0.1 milliGRAM(s) IV Push every 3 minutes PRN For ANY of the following changes in patient status:  A. RR LESS THAN 10 breaths per minute, B. Oxygen saturation LESS THAN 90%, C. Sedation score of 6  ondansetron Injectable 4 milliGRAM(s) IV Push every 6 hours PRN Nausea  ondansetron Injectable 4 milliGRAM(s) IV Push every 6 hours PRN Nausea      OBJECTIVE:     Sedation Score:	[ X] Alert	 [ ] Drowsy 	[ ] Arousable	[ ] Asleep	[ ] Unresponsive    Side Effects:	[X ] None	[ ] Nausea	[ ] Vomiting	[ ] Pruritus  		[ ] Other:    Vital Signs Last 24 Hrs  T(C): 36.7 (08 Aug 2021 09:00), Max: 37.2 (08 Aug 2021 04:00)  T(F): 98.1 (08 Aug 2021 09:00), Max: 98.9 (08 Aug 2021 04:00)  HR: 70 (08 Aug 2021 09:00) (66 - 92)  BP: 161/82 (08 Aug 2021 09:00) (120/74 - 166/83)  BP(mean): 92 (08 Aug 2021 04:00) (84 - 147)  RR: 18 (08 Aug 2021 09:00) (16 - 27)  SpO2: 93% (08 Aug 2021 09:00) (91% - 95%)    ASSESSMENT/ PLAN    Therapy to  be:	[ X] Continue   [ ] Discontinued   [ ] Change to prn Analgesics    Documentation and Verification of current medications:   [X] Done	[ ] Not done, not elligible    Comments: Will continue with IV Dilaudid PCA.  Recommend non-opioid adjuvant analgesics to be used when possible and when allowed by primary surgical team.      patient seen earlier

## 2021-08-09 NOTE — PROGRESS NOTE ADULT - ASSESSMENT
67 y/o male with hx HTN, GERD, pancreatic mass, s/p biliary stent placement at [facility]. Patient now s/p Robot-assisted laparoscopic whipple, converted to open. Tumor noted to be densely adhesed to SMV, making robotic dissection unsafe to continue. (8/6)    Plan:      D team surgery  #[id number]  67 y/o male with hx HTN, GERD, pancreatic mass, s/p biliary stent placement at [facility]. Patient now s/p Robot-assisted laparoscopic whipple, converted to open. Tumor noted to be densely adhesed to SMV, making robotic dissection unsafe to continue. (8/6)    Plan:  - NPO/NGT   - clamp trial today  - pain control PRN  - DVT ppx   - OOB  - monitor NGT output        D team surgery  #[id number]

## 2021-08-09 NOTE — PROGRESS NOTE ADULT - SUBJECTIVE AND OBJECTIVE BOX
Anesthesia Pain Management Service    SUBJECTIVE: Patient is doing well with IV PCA and no significant problems reported.    Pain Scale Score	At rest: __3_ 	With Activity: ___ 	[X ] Refer to charted pain scores    THERAPY:    [ ] IV PCA Morphine		[ ] 5 mg/mL	[ ] 1 mg/mL  [X ] IV PCA Hydromorphone	[ ] 5 mg/mL	[X ] 1 mg/mL  [ ] IV PCA Fentanyl		[ ] 50 micrograms/mL    Demand dose __0.2_ lockout __6_ (minutes) Continuous Rate _0__ Total: _6.6__  mg used (in past 24 hours)      MEDICATIONS  (STANDING):  dextrose 5% + sodium chloride 0.45% with potassium chloride 20 mEq/L 1000 milliLiter(s) (100 mL/Hr) IV Continuous <Continuous>  heparin   Injectable 5000 Unit(s) SubCutaneous every 8 hours  HYDROmorphone PCA (1 mG/mL) 30 milliLiter(s) PCA Continuous PCA Continuous  metoprolol tartrate Injectable 5 milliGRAM(s) IV Push every 6 hours  morphine PF Spinal 0.2 milliGRAM(s) IntraThecal. once  pantoprazole  Injectable 40 milliGRAM(s) IV Push daily  piperacillin/tazobactam IVPB.. 3.375 Gram(s) IV Intermittent every 8 hours  potassium chloride  10 mEq/100 mL IVPB 10 milliEquivalent(s) IV Intermittent every 1 hour  potassium phosphate IVPB 30 milliMole(s) IV Intermittent once    MEDICATIONS  (PRN):  HYDROmorphone PCA (1 mG/mL) Rescue Clinician Bolus 0.5 milliGRAM(s) IV Push every 15 minutes PRN for Pain Scale GREATER THAN 6  naloxone Injectable 0.1 milliGRAM(s) IV Push every 3 minutes PRN For ANY of the following changes in patient status:  A. RR LESS THAN 10 breaths per minute, B. Oxygen saturation LESS THAN 90%, C. Sedation score of 6  ondansetron Injectable 4 milliGRAM(s) IV Push every 6 hours PRN Nausea  ondansetron Injectable 4 milliGRAM(s) IV Push every 6 hours PRN Nausea      OBJECTIVE: laying in bed     Sedation Score:	[ X] Alert	[ ] Drowsy 	[ ] Arousable	[ ] Asleep	[ ] Unresponsive    Side Effects:	[X ] None	[ ] Nausea	[ ] Vomiting	[ ] Pruritus  		[ ] Other:    Vital Signs Last 24 Hrs  T(C): 36.6 (09 Aug 2021 06:00), Max: 37.1 (08 Aug 2021 13:00)  T(F): 97.9 (09 Aug 2021 06:00), Max: 98.8 (08 Aug 2021 23:30)  HR: 58 (09 Aug 2021 06:00) (58 - 110)  BP: 160/78 (09 Aug 2021 06:00) (160/78 - 172/82)  BP(mean): --  RR: 18 (09 Aug 2021 06:00) (18 - 18)  SpO2: 99% (09 Aug 2021 06:00) (93% - 99%)    ASSESSMENT/ PLAN    Therapy to  be:	[ X] Continue   [ ] Discontinued   [ ] Change to prn Analgesics    Documentation and Verification of current medications:   [X] Done	[ ] Not done, not elligible    Comments: Recommend non-opioid adjuvant analgesics to be used when possible and when allowed by primary surgical team.    Progress Note written now but Patient was seen earlier.

## 2021-08-09 NOTE — PROVIDER CONTACT NOTE (OTHER) - ASSESSMENT
pt had one count nausea and vomitted 20 ml. Mostly spit but a little green bilious fluid. zofran given

## 2021-08-09 NOTE — PROGRESS NOTE ADULT - SUBJECTIVE AND OBJECTIVE BOX
TEAM Surgery Progress Note    INTERVAL EVENTS:   - PM BMP: low phos and K, both repleted     SUBJECTIVE: Patient seen and examined at bedside with surgical team,      OBJECTIVE:    Vital Signs Last 24 Hrs  T(C): 37.1 (08 Aug 2021 23:30), Max: 37.2 (08 Aug 2021 04:00)  T(F): 98.8 (08 Aug 2021 23:30), Max: 98.9 (08 Aug 2021 04:00)  HR: 110 (08 Aug 2021 23:30) (60 - 110)  BP: 172/82 (08 Aug 2021 23:30) (139/76 - 172/82)  BP(mean): 92 (08 Aug 2021 04:00) (92 - 92)  RR: 18 (08 Aug 2021 23:30) (16 - 18)  SpO2: 98% (08 Aug 2021 23:30) (92% - 98%)I&O's Detail    07 Aug 2021 07:01  -  08 Aug 2021 07:00  --------------------------------------------------------  IN:    IV PiggyBack: 100 mL    Lactated Ringers: 1700 mL  Total IN: 1800 mL    OUT:    Bulb (mL): 80 mL    Indwelling Catheter - Urethral (mL): 1655 mL    Nasogastric/Oral tube (mL): 1200 mL  Total OUT: 2935 mL    Total NET: -1135 mL      08 Aug 2021 07:01  -  09 Aug 2021 01:52  --------------------------------------------------------  IN:    Lactated Ringers: 900 mL  Total IN: 900 mL    OUT:    Bulb (mL): 190 mL    Indwelling Catheter - Urethral (mL): 335 mL    Nasogastric/Oral tube (mL): 650 mL    Voided (mL): 2125 mL  Total OUT: 3300 mL    Total NET: -2400 mL      MEDICATIONS  (STANDING):  heparin   Injectable 5000 Unit(s) SubCutaneous every 8 hours  HYDROmorphone PCA (1 mG/mL) 30 milliLiter(s) PCA Continuous PCA Continuous  lactated ringers. 1000 milliLiter(s) (100 mL/Hr) IV Continuous <Continuous>  metoprolol tartrate Injectable 5 milliGRAM(s) IV Push every 6 hours  morphine PF Spinal 0.2 milliGRAM(s) IntraThecal. once  pantoprazole  Injectable 40 milliGRAM(s) IV Push daily  piperacillin/tazobactam IVPB.. 3.375 Gram(s) IV Intermittent every 8 hours    MEDICATIONS  (PRN):  HYDROmorphone PCA (1 mG/mL) Rescue Clinician Bolus 0.5 milliGRAM(s) IV Push every 15 minutes PRN for Pain Scale GREATER THAN 6  naloxone Injectable 0.1 milliGRAM(s) IV Push every 3 minutes PRN For ANY of the following changes in patient status:  A. RR LESS THAN 10 breaths per minute, B. Oxygen saturation LESS THAN 90%, C. Sedation score of 6  ondansetron Injectable 4 milliGRAM(s) IV Push every 6 hours PRN Nausea  ondansetron Injectable 4 milliGRAM(s) IV Push every 6 hours PRN Nausea      PHYSICAL EXAM:  Constitutional: A&Ox3, NAD  Respiratory: Unlabored breathing  Abdomen: Soft, nondistended, NTTP. No rebound or guarding.  Extremities: WWP, RICHTER spontaneously    LABS:                        11.7   13.31 )-----------( 243      ( 08 Aug 2021 04:53 )             33.8     08-08    136  |  99  |  8   ----------------------------<  119<H>  3.3<L>   |  25  |  0.58    Ca    8.5      08 Aug 2021 18:12  Phos  1.7     08-08  Mg     1.80     08-08    TPro  5.8<L>  /  Alb  3.4  /  TBili  1.8<H>  /  DBili  x   /  AST  449<H>  /  ALT  549<H>  /  AlkPhos  54  08-08    PT/INR - ( 08 Aug 2021 04:53 )   PT: 15.3 sec;   INR: 1.36 ratio         PTT - ( 08 Aug 2021 04:53 )  PTT:28.7 sec  LIVER FUNCTIONS - ( 08 Aug 2021 04:53 )  Alb: 3.4 g/dL / Pro: 5.8 g/dL / ALK PHOS: 54 U/L / ALT: 549 U/L / AST: 449 U/L / GGT: x                 IMAGING:      TEAM Surgery Progress Note    INTERVAL EVENTS:   - PM BMP: low phos and K, both repleted     SUBJECTIVE: Patient seen and examined at bedside with surgical team,      OBJECTIVE:  Vital Signs Last 24 Hrs  T(C): 36.6 (09 Aug 2021 06:00), Max: 37.1 (08 Aug 2021 13:00)  T(F): 97.9 (09 Aug 2021 06:00), Max: 98.8 (08 Aug 2021 23:30)  HR: 58 (09 Aug 2021 06:00) (58 - 110)  BP: 160/78 (09 Aug 2021 06:00) (160/78 - 172/82)  BP(mean): --  RR: 18 (09 Aug 2021 06:00) (18 - 18)  SpO2: 99% (09 Aug 2021 06:00) (93% - 99%)      08 Aug 2021 07:01  -  09 Aug 2021 07:00  --------------------------------------------------------  IN:    Lactated Ringers: 1800 mL    Oral Fluid: 200 mL  Total IN: 2000 mL    OUT:    Bulb (mL): 270 mL    Indwelling Catheter - Urethral (mL): 335 mL    Nasogastric/Oral tube (mL): 850 mL    Voided (mL): 3025 mL  Total OUT: 4480 mL    Total NET: -2480 mL      09 Aug 2021 07:01  -  09 Aug 2021 08:30  --------------------------------------------------------  IN:  Total IN: 0 mL    OUT:    Voided (mL): 200 mL  Total OUT: 200 mL    Total NET: -200 mL          MEDICATIONS  (STANDING):  heparin   Injectable 5000 Unit(s) SubCutaneous every 8 hours  HYDROmorphone PCA (1 mG/mL) 30 milliLiter(s) PCA Continuous PCA Continuous  lactated ringers. 1000 milliLiter(s) (100 mL/Hr) IV Continuous <Continuous>  metoprolol tartrate Injectable 5 milliGRAM(s) IV Push every 6 hours  morphine PF Spinal 0.2 milliGRAM(s) IntraThecal. once  pantoprazole  Injectable 40 milliGRAM(s) IV Push daily  piperacillin/tazobactam IVPB.. 3.375 Gram(s) IV Intermittent every 8 hours    MEDICATIONS  (PRN):  HYDROmorphone PCA (1 mG/mL) Rescue Clinician Bolus 0.5 milliGRAM(s) IV Push every 15 minutes PRN for Pain Scale GREATER THAN 6  naloxone Injectable 0.1 milliGRAM(s) IV Push every 3 minutes PRN For ANY of the following changes in patient status:  A. RR LESS THAN 10 breaths per minute, B. Oxygen saturation LESS THAN 90%, C. Sedation score of 6  ondansetron Injectable 4 milliGRAM(s) IV Push every 6 hours PRN Nausea  ondansetron Injectable 4 milliGRAM(s) IV Push every 6 hours PRN Nausea      PHYSICAL EXAM:  Constitutional: A&Ox3, NAD  Respiratory: Unlabored breathing  Abdomen: Soft, nondistended, NTTP. No rebound or guarding.   Extremities: WWP, RICHTER spontaneously        LABS:  cret                        12.2   10.68 )-----------( 243      ( 09 Aug 2021 07:53 )             34.9     08-08    136  |  99  |  8   ----------------------------<  119<H>  3.3<L>   |  25  |  0.58    Ca    8.5      08 Aug 2021 18:12  Phos  1.7     08-08  Mg     1.80     08-08    TPro  5.8<L>  /  Alb  3.4  /  TBili  1.8<H>  /  DBili  x   /  AST  449<H>  /  ALT  549<H>  /  AlkPhos  54  08-08    PT/INR - ( 08 Aug 2021 04:53 )   PT: 15.3 sec;   INR: 1.36 ratio         PTT - ( 08 Aug 2021 04:53 )  PTT:28.7 sec

## 2021-08-10 NOTE — DIETITIAN INITIAL EVALUATION ADULT. - ADD RECOMMEND
1. Advance diet as medically able  2. Ensure Clear x2/day when diet advanced to clears  3. Monitor weight, labs, po intake and skin integrity.

## 2021-08-10 NOTE — DIETITIAN INITIAL EVALUATION ADULT. - OTHER INFO
Pt has a history of HTN, GERD, Jaundice, Pancreatic Mass and s/p billary stent. Pt  is s/p Whipple procedure.  Pt has been NPO with Ice chips since surgery. He had no complaints of GI distress. Pt is edentulous and states he does not have denture with him but that does not interfere with him eating. He has no known food allergies. Pt anxious for diet to be advanced.  Instructed Pt on a post Whipple - Bariatric 3 diet. Written information was given to Pt.  Pt made aware that RDN remains available.

## 2021-08-10 NOTE — PROGRESS NOTE ADULT - ASSESSMENT
65 y/o male with hx HTN, GERD, pancreatic mass, s/p biliary stent placement at OSH. Patient now s/p Robot-assisted laparoscopic whipple, converted to open. Tumor noted to be densely adhesed to SMV, making robotic dissection unsafe to continue. (8/6)    Plan:        D team surgery  #[id number]  65 y/o male with hx HTN, GERD, pancreatic mass, s/p biliary stent placement at OSH. Patient now s/p Robot-assisted laparoscopic whipple, converted to open. Tumor noted to be densely adhesed to SMV, making robotic dissection unsafe to continue. (8/6) NGT removed 8/9.    Plan:  - NPO with sips  - pain control PRN  - monitor vitals/I&Os/electrolytes  - DVT ppx   - OOB         D team surgery  #[id number]

## 2021-08-10 NOTE — PROGRESS NOTE ADULT - SUBJECTIVE AND OBJECTIVE BOX
Anesthesia Pain Management Service    SUBJECTIVE: Pt doing well with IV PCA without problems reported.    Therapy:	  [ X] IV PCA	   [ ] Epidural           [ ] s/p Spinal Opoid              [ ] Postpartum infusion	  [ ] Patient controlled regional anesthesia (PCRA)    [ ] prn Analgesics    Allergies    No Known Allergies    Intolerances    hydrALAZINE (Vomiting)  hydrALAZINE (Vomiting; Nausea)    MEDICATIONS  (STANDING):  ciprofloxacin   IVPB      ciprofloxacin   IVPB 400 milliGRAM(s) IV Intermittent every 12 hours  dextrose 5% + sodium chloride 0.45% with potassium chloride 20 mEq/L 1000 milliLiter(s) (84 mL/Hr) IV Continuous <Continuous>  fluconAZOLE IVPB      fluconAZOLE IVPB 400 milliGRAM(s) IV Intermittent every 24 hours  heparin   Injectable 5000 Unit(s) SubCutaneous every 8 hours  HYDROmorphone PCA (1 mG/mL) 30 milliLiter(s) PCA Continuous PCA Continuous  metoprolol tartrate Injectable 5 milliGRAM(s) IV Push every 6 hours  pantoprazole  Injectable 40 milliGRAM(s) IV Push daily    MEDICATIONS  (PRN):  HYDROmorphone PCA (1 mG/mL) Rescue Clinician Bolus 0.5 milliGRAM(s) IV Push every 15 minutes PRN for Pain Scale GREATER THAN 6  naloxone Injectable 0.1 milliGRAM(s) IV Push every 3 minutes PRN For ANY of the following changes in patient status:  A. RR LESS THAN 10 breaths per minute, B. Oxygen saturation LESS THAN 90%, C. Sedation score of 6      OBJECTIVE:   [X] No new signs     [ ] Other:    Side Effects:  [X ] None			[ ] Other:    Assessment of Catheter Site:		[ ] Intact		[ ] Other:    ASSESSMENT/PLAN  [ X] Continue current therapy    [ ] Therapy changed to:    [ ] IV PCA       [ ] Epidural     [ ] prn Analgesics     Comments:

## 2021-08-10 NOTE — PROGRESS NOTE ADULT - SUBJECTIVE AND OBJECTIVE BOX
Anesthesia Pain Management Service    SUBJECTIVE: Patient is doing well with IV PCA and no significant problems reported.    Pain Scale Score	At rest: __3_ 	With Activity: ___ 	[X ] Refer to charted pain scores    THERAPY:    [ ] IV PCA Morphine		[ ] 5 mg/mL	[ ] 1 mg/mL  [X ] IV PCA Hydromorphone	[ ] 5 mg/mL	[X ] 1 mg/mL  [ ] IV PCA Fentanyl		[ ] 50 micrograms/mL    Demand dose __0.2_ lockout __6_ (minutes) Continuous Rate _0__ Total: _6.2__  mg used (in past 24 hours)      MEDICATIONS  (STANDING):  acetaminophen  IVPB .. 500 milliGRAM(s) IV Intermittent every 6 hours  ciprofloxacin   IVPB      ciprofloxacin   IVPB 400 milliGRAM(s) IV Intermittent every 12 hours  dextrose 5% + sodium chloride 0.45% with potassium chloride 20 mEq/L 1000 milliLiter(s) (100 mL/Hr) IV Continuous <Continuous>  fluconAZOLE IVPB      fluconAZOLE IVPB 400 milliGRAM(s) IV Intermittent every 24 hours  heparin   Injectable 5000 Unit(s) SubCutaneous every 8 hours  HYDROmorphone PCA (1 mG/mL) 30 milliLiter(s) PCA Continuous PCA Continuous  metoprolol tartrate Injectable 5 milliGRAM(s) IV Push every 6 hours  pantoprazole  Injectable 40 milliGRAM(s) IV Push daily  potassium chloride    Tablet ER 40 milliEquivalent(s) Oral once  potassium phosphate / sodium phosphate Tablet (K-PHOS No. 2) 2 Tablet(s) Oral once  potassium phosphate IVPB 30 milliMole(s) IV Intermittent once    MEDICATIONS  (PRN):  HYDROmorphone PCA (1 mG/mL) Rescue Clinician Bolus 0.5 milliGRAM(s) IV Push every 15 minutes PRN for Pain Scale GREATER THAN 6  naloxone Injectable 0.1 milliGRAM(s) IV Push every 3 minutes PRN For ANY of the following changes in patient status:  A. RR LESS THAN 10 breaths per minute, B. Oxygen saturation LESS THAN 90%, C. Sedation score of 6      OBJECTIVE: laying in bed     Sedation Score:	[ X] Alert	[ ] Drowsy 	[ ] Arousable	[ ] Asleep	[ ] Unresponsive    Side Effects:	[X ] None	[ ] Nausea	[ ] Vomiting	[ ] Pruritus  		[ ] Other:    Vital Signs Last 24 Hrs  T(C): 36.7 (10 Aug 2021 04:00), Max: 37.2 (10 Aug 2021 00:00)  T(F): 98.1 (10 Aug 2021 04:00), Max: 98.9 (10 Aug 2021 00:00)  HR: 69 (10 Aug 2021 04:00) (55 - 70)  BP: 165/83 (10 Aug 2021 04:00) (137/80 - 170/84)  BP(mean): --  RR: 16 (10 Aug 2021 04:00) (16 - 18)  SpO2: 100% (10 Aug 2021 04:00) (95% - 100%)    ASSESSMENT/ PLAN    Therapy to  be:	[ X] Continue   [ ] Discontinued   [ ] Change to prn Analgesics    Documentation and Verification of current medications:   [X] Done	[ ] Not done, not elligible    Comments: Recommend non-opioid adjuvant analgesics to be used when possible and when allowed by primary surgical team.    Progress Note written now but Patient was seen earlier.

## 2021-08-10 NOTE — PROGRESS NOTE ADULT - NSICDXPILOT_GEN_ALL_CORE
[place]

## 2021-08-11 NOTE — PROGRESS NOTE ADULT - SUBJECTIVE AND OBJECTIVE BOX
Anesthesia Pain Management Service    SUBJECTIVE: Patient is doing well with IV PCA and no significant problems reported.    Pain Scale Score	At rest: _3__ 	With Activity: ___ 	[X ] Refer to charted pain scores    THERAPY:    [ ] IV PCA Morphine		[ ] 5 mg/mL	[ ] 1 mg/mL  [X ] IV PCA Hydromorphone	[ ] 5 mg/mL	[X ] 1 mg/mL  [ ] IV PCA Fentanyl		[ ] 50 micrograms/mL    Demand dose __0.2_ lockout __6_ (minutes) Continuous Rate _0__ Total: _3.8__  mg used (in past 24 hours)      MEDICATIONS  (STANDING):  dextrose 5% + sodium chloride 0.45% with potassium chloride 20 mEq/L 1000 milliLiter(s) (84 mL/Hr) IV Continuous <Continuous>  fluconAZOLE IVPB      fluconAZOLE IVPB 400 milliGRAM(s) IV Intermittent every 24 hours  heparin   Injectable 5000 Unit(s) SubCutaneous every 8 hours  HYDROmorphone PCA (1 mG/mL) 30 milliLiter(s) PCA Continuous PCA Continuous  magnesium sulfate  IVPB 2 Gram(s) IV Intermittent once  metoprolol tartrate Injectable 5 milliGRAM(s) IV Push every 6 hours  pantoprazole  Injectable 40 milliGRAM(s) IV Push daily  potassium phosphate IVPB 15 milliMole(s) IV Intermittent once    MEDICATIONS  (PRN):  HYDROmorphone PCA (1 mG/mL) Rescue Clinician Bolus 0.5 milliGRAM(s) IV Push every 15 minutes PRN for Pain Scale GREATER THAN 6  naloxone Injectable 0.1 milliGRAM(s) IV Push every 3 minutes PRN For ANY of the following changes in patient status:  A. RR LESS THAN 10 breaths per minute, B. Oxygen saturation LESS THAN 90%, C. Sedation score of 6      OBJECTIVE: laying in bed     Sedation Score:	[ X] Alert	[ ] Drowsy 	[ ] Arousable	[ ] Asleep	[ ] Unresponsive    Side Effects:	[X ] None	[ ] Nausea	[ ] Vomiting	[ ] Pruritus  		[ ] Other:    Vital Signs Last 24 Hrs  T(C): 37.1 (11 Aug 2021 05:59), Max: 37.3 (10 Aug 2021 21:57)  T(F): 98.7 (11 Aug 2021 05:59), Max: 99.2 (11 Aug 2021 00:48)  HR: 71 (11 Aug 2021 05:59) (61 - 71)  BP: 165/82 (11 Aug 2021 05:59) (141/85 - 165/82)  BP(mean): --  RR: 18 (11 Aug 2021 05:59) (18 - 20)  SpO2: 99% (11 Aug 2021 05:59) (98% - 100%)    ASSESSMENT/ PLAN    Therapy to  be:	[ X] Continue   [ ] Discontinued   [ ] Change to prn Analgesics    Documentation and Verification of current medications:   [X] Done	[ ] Not done, not elligible    Comments: Recommend non-opioid adjuvant analgesics to be used when possible and when allowed by primary surgical team.    Progress Note written now but Patient was seen earlier. Anesthesia Pain Management Service    SUBJECTIVE: Patient is doing well with IV PCA and no significant problems reported.    Pain Scale Score	At rest: _3__ 	With Activity: ___ 	[X ] Refer to charted pain scores    THERAPY:    [ ] IV PCA Morphine		[ ] 5 mg/mL	[ ] 1 mg/mL  [X ] IV PCA Hydromorphone	[ ] 5 mg/mL	[X ] 1 mg/mL  [ ] IV PCA Fentanyl		[ ] 50 micrograms/mL    Demand dose __0.2_ lockout __6_ (minutes) Continuous Rate _0__ Total: _3.8__  mg used (in past 24 hours)      MEDICATIONS  (STANDING):  dextrose 5% + sodium chloride 0.45% with potassium chloride 20 mEq/L 1000 milliLiter(s) (84 mL/Hr) IV Continuous <Continuous>  fluconAZOLE IVPB      fluconAZOLE IVPB 400 milliGRAM(s) IV Intermittent every 24 hours  heparin   Injectable 5000 Unit(s) SubCutaneous every 8 hours  HYDROmorphone PCA (1 mG/mL) 30 milliLiter(s) PCA Continuous PCA Continuous  magnesium sulfate  IVPB 2 Gram(s) IV Intermittent once  metoprolol tartrate Injectable 5 milliGRAM(s) IV Push every 6 hours  pantoprazole  Injectable 40 milliGRAM(s) IV Push daily  potassium phosphate IVPB 15 milliMole(s) IV Intermittent once    MEDICATIONS  (PRN):  HYDROmorphone PCA (1 mG/mL) Rescue Clinician Bolus 0.5 milliGRAM(s) IV Push every 15 minutes PRN for Pain Scale GREATER THAN 6  naloxone Injectable 0.1 milliGRAM(s) IV Push every 3 minutes PRN For ANY of the following changes in patient status:  A. RR LESS THAN 10 breaths per minute, B. Oxygen saturation LESS THAN 90%, C. Sedation score of 6      OBJECTIVE: laying in bed     Sedation Score:	[ X] Alert	[ ] Drowsy 	[ ] Arousable	[ ] Asleep	[ ] Unresponsive    Side Effects:	[X ] None	[ ] Nausea	[ ] Vomiting	[ ] Pruritus  		[ ] Other:    Vital Signs Last 24 Hrs  T(C): 37.1 (11 Aug 2021 05:59), Max: 37.3 (10 Aug 2021 21:57)  T(F): 98.7 (11 Aug 2021 05:59), Max: 99.2 (11 Aug 2021 00:48)  HR: 71 (11 Aug 2021 05:59) (61 - 71)  BP: 165/82 (11 Aug 2021 05:59) (141/85 - 165/82)  BP(mean): --  RR: 18 (11 Aug 2021 05:59) (18 - 20)  SpO2: 99% (11 Aug 2021 05:59) (98% - 100%)    ASSESSMENT/ PLAN    Therapy to  be:	[ ] Continue   [ X] Discontinued   [X ] Change to prn Analgesics    Documentation and Verification of current medications:   [X] Done	[ ] Not done, not elligible    Comments: D/C IVPCA as per team. Oral PRN pain meds at this point.     Progress Note written now but Patient was seen earlier.
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
## 2021-08-11 NOTE — PROGRESS NOTE ADULT - SUBJECTIVE AND OBJECTIVE BOX
Anesthesia Pain Management Service    SUBJECTIVE: Pt doing well with IV PCA without problems reported.    Therapy:	  [ X] IV PCA	   [ ] Epidural           [ ] s/p Spinal Opoid              [ ] Postpartum infusion	  [ ] Patient controlled regional anesthesia (PCRA)    [ ] prn Analgesics    Allergies    No Known Allergies    Intolerances    hydrALAZINE (Vomiting)  hydrALAZINE (Vomiting; Nausea)    MEDICATIONS  (STANDING):  dextrose 5% + sodium chloride 0.45% with potassium chloride 20 mEq/L 1000 milliLiter(s) (84 mL/Hr) IV Continuous <Continuous>  fluconAZOLE IVPB      fluconAZOLE IVPB 400 milliGRAM(s) IV Intermittent every 24 hours  heparin   Injectable 5000 Unit(s) SubCutaneous every 8 hours  HYDROmorphone PCA (1 mG/mL) 30 milliLiter(s) PCA Continuous PCA Continuous  metoprolol tartrate Injectable 5 milliGRAM(s) IV Push every 6 hours  pantoprazole  Injectable 40 milliGRAM(s) IV Push daily    MEDICATIONS  (PRN):  HYDROmorphone PCA (1 mG/mL) Rescue Clinician Bolus 0.5 milliGRAM(s) IV Push every 15 minutes PRN for Pain Scale GREATER THAN 6  naloxone Injectable 0.1 milliGRAM(s) IV Push every 3 minutes PRN For ANY of the following changes in patient status:  A. RR LESS THAN 10 breaths per minute, B. Oxygen saturation LESS THAN 90%, C. Sedation score of 6      OBJECTIVE:   [X] No new signs     [ ] Other:    Side Effects:  [X ] None			[ ] Other:    Assessment of Catheter Site:		[ ] Intact		[ ] Other:    ASSESSMENT/PLAN  [ X] Continue current therapy    [ ] Therapy changed to:    [ ] IV PCA       [ ] Epidural     [ ] prn Analgesics     Comments:
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
## 2021-08-12 NOTE — PROGRESS NOTE ADULT - SUBJECTIVE AND OBJECTIVE BOX
D TEAM Surgery Progress Note    INTERVAL EVENTS:   - Called to bedside due to increase in O2 requirements, at 5L with O2 sat 92%. Patient reports more phlegm but otherwise asymptomatic. Repeat chest xray shows small L pleural effusion, unchanged from CXR on 8/7. Educated patient on importance of ISS and to OOB to chair/ambulate.     SUBJECTIVE: Patient seen and examined at bedside with surgical team,      OBJECTIVE:    Vital Signs Last 24 Hrs  T(C): 36.8 (11 Aug 2021 23:30), Max: 37.6 (11 Aug 2021 17:13)  T(F): 98.3 (11 Aug 2021 23:30), Max: 99.6 (11 Aug 2021 17:13)  HR: 58 (11 Aug 2021 23:30) (58 - 71)  BP: 151/75 (11 Aug 2021 23:30) (148/75 - 165/82)  BP(mean): --  RR: 18 (11 Aug 2021 23:30) (16 - 18)  SpO2: 94% (11 Aug 2021 23:30) (92% - 99%)I&O's Detail    10 Aug 2021 07:01  -  11 Aug 2021 07:00  --------------------------------------------------------  IN:    dextrose 5% + sodium chloride 0.45% w/ Additives: 2030 mL    IV PiggyBack: 1400 mL  Total IN: 3430 mL    OUT:    Bulb (mL): 375 mL    Emesis (mL): 0 mL    Oral Fluid: 0 mL    Voided (mL): 3200 mL  Total OUT: 3575 mL    Total NET: -145 mL      11 Aug 2021 07:01  -  12 Aug 2021 00:08  --------------------------------------------------------  IN:    dextrose 5% + sodium chloride 0.45% w/ Additives: 210 mL  Total IN: 210 mL    OUT:    Bulb (mL): 350 mL    Oral Fluid: 0 mL    Voided (mL): 950 mL  Total OUT: 1300 mL    Total NET: -1090 mL      MEDICATIONS  (STANDING):  dextrose 5% + sodium chloride 0.45% with potassium chloride 20 mEq/L 1000 milliLiter(s) (42 mL/Hr) IV Continuous <Continuous>  fluconAZOLE IVPB      fluconAZOLE IVPB 400 milliGRAM(s) IV Intermittent every 24 hours  heparin   Injectable 5000 Unit(s) SubCutaneous every 8 hours  metoprolol tartrate Injectable 5 milliGRAM(s) IV Push every 6 hours  pantoprazole  Injectable 40 milliGRAM(s) IV Push daily    MEDICATIONS  (PRN):  acetaminophen   Tablet .. 975 milliGRAM(s) Oral every 6 hours PRN Mild Pain (1 - 3)  naloxone Injectable 0.1 milliGRAM(s) IV Push every 3 minutes PRN For ANY of the following changes in patient status:  A. RR LESS THAN 10 breaths per minute, B. Oxygen saturation LESS THAN 90%, C. Sedation score of 6  oxyCODONE    IR 5 milliGRAM(s) Oral every 3 hours PRN Moderate Pain (4 - 6)  oxyCODONE    IR 10 milliGRAM(s) Oral every 3 hours PRN Severe Pain (7 - 10)      PHYSICAL EXAM:  Constitutional: A&Ox3, NAD  Respiratory: Unlabored breathing  Abdomen: Soft, nondistended, NTTP. No rebound or guarding.  Extremities: WWP, RICHTER spontaneously    LABS:                        12.7   7.47  )-----------( 333      ( 11 Aug 2021 07:00 )             36.4     08-11    136  |  102  |  5<L>  ----------------------------<  131<H>  3.9   |  21<L>  |  0.60    Ca    8.8      11 Aug 2021 07:00  Phos  2.5     08-11  Mg     1.80     08-11    TPro  6.5  /  Alb  3.3  /  TBili  1.1  /  DBili  x   /  AST  52<H>  /  ALT  184<H>  /  AlkPhos  80  08-11      LIVER FUNCTIONS - ( 11 Aug 2021 07:00 )  Alb: 3.3 g/dL / Pro: 6.5 g/dL / ALK PHOS: 80 U/L / ALT: 184 U/L / AST: 52 U/L / GGT: x                 IMAGING:     D TEAM Surgery Progress Note    INTERVAL EVENTS:   - Called to bedside due to increase in O2 requirements, at 5L with O2 sat 92%. Patient reports more phlegm but otherwise asymptomatic. Repeat chest xray shows small L pleural effusion, unchanged from CXR on 8/7. Educated patient on importance of ISS and to OOB to chair/ambulate.     SUBJECTIVE: Patient seen and examined at bedside with surgical team, passing flatus, awaiting bowel movement. No nausea/vomiting.     OBJECTIVE:    Vital Signs Last 24 Hrs  T(C): 36.8 (11 Aug 2021 23:30), Max: 37.6 (11 Aug 2021 17:13)  T(F): 98.3 (11 Aug 2021 23:30), Max: 99.6 (11 Aug 2021 17:13)  HR: 58 (11 Aug 2021 23:30) (58 - 71)  BP: 151/75 (11 Aug 2021 23:30) (148/75 - 165/82)  BP(mean): --  RR: 18 (11 Aug 2021 23:30) (16 - 18)  SpO2: 94% (11 Aug 2021 23:30) (92% - 99%)I&O's Detail    10 Aug 2021 07:01  -  11 Aug 2021 07:00  --------------------------------------------------------  IN:    dextrose 5% + sodium chloride 0.45% w/ Additives: 2030 mL    IV PiggyBack: 1400 mL  Total IN: 3430 mL    OUT:    Bulb (mL): 375 mL    Emesis (mL): 0 mL    Oral Fluid: 0 mL    Voided (mL): 3200 mL  Total OUT: 3575 mL    Total NET: -145 mL      11 Aug 2021 07:01  -  12 Aug 2021 00:08  --------------------------------------------------------  IN:    dextrose 5% + sodium chloride 0.45% w/ Additives: 210 mL  Total IN: 210 mL    OUT:    Bulb (mL): 350 mL    Oral Fluid: 0 mL    Voided (mL): 950 mL  Total OUT: 1300 mL    Total NET: -1090 mL      MEDICATIONS  (STANDING):  dextrose 5% + sodium chloride 0.45% with potassium chloride 20 mEq/L 1000 milliLiter(s) (42 mL/Hr) IV Continuous <Continuous>  fluconAZOLE IVPB      fluconAZOLE IVPB 400 milliGRAM(s) IV Intermittent every 24 hours  heparin   Injectable 5000 Unit(s) SubCutaneous every 8 hours  metoprolol tartrate Injectable 5 milliGRAM(s) IV Push every 6 hours  pantoprazole  Injectable 40 milliGRAM(s) IV Push daily    MEDICATIONS  (PRN):  acetaminophen   Tablet .. 975 milliGRAM(s) Oral every 6 hours PRN Mild Pain (1 - 3)  naloxone Injectable 0.1 milliGRAM(s) IV Push every 3 minutes PRN For ANY of the following changes in patient status:  A. RR LESS THAN 10 breaths per minute, B. Oxygen saturation LESS THAN 90%, C. Sedation score of 6  oxyCODONE    IR 5 milliGRAM(s) Oral every 3 hours PRN Moderate Pain (4 - 6)  oxyCODONE    IR 10 milliGRAM(s) Oral every 3 hours PRN Severe Pain (7 - 10)      PHYSICAL EXAM:  Constitutional: A&Ox3, NAD  Respiratory: Unlabored breathing  Abdomen: soft, Non-tender, Non-distended.  Incisions clean, dry and intact.  Extremities: WWP, RICHTER spontaneously    LABS:                        12.7   7.47  )-----------( 333      ( 11 Aug 2021 07:00 )             36.4     08-11    136  |  102  |  5<L>  ----------------------------<  131<H>  3.9   |  21<L>  |  0.60    Ca    8.8      11 Aug 2021 07:00  Phos  2.5     08-11  Mg     1.80     08-11    TPro  6.5  /  Alb  3.3  /  TBili  1.1  /  DBili  x   /  AST  52<H>  /  ALT  184<H>  /  AlkPhos  80  08-11      LIVER FUNCTIONS - ( 11 Aug 2021 07:00 )  Alb: 3.3 g/dL / Pro: 6.5 g/dL / ALK PHOS: 80 U/L / ALT: 184 U/L / AST: 52 U/L / GGT: x                 IMAGING:

## 2021-08-13 NOTE — DISCHARGE NOTE PROVIDER - NSDCMRMEDTOKEN_GEN_ALL_CORE_FT
amLODIPine 5 mg oral tablet: 1 tab(s) orally once a day  amLODIPine 5 mg oral tablet: 1 tab(s) orally once a day   amoxicillin 500 mg oral capsule: 2 cap(s) orally 2 times a day  clarithromycin 500 mg oral tablet: 1 tab(s) orally every 12 hours  cloNIDine 0.1 mg oral tablet: 1 tab(s) orally 2 times a day   omeprazole 20 mg oral delayed release capsule: 1 cap(s) orally 2 times a day   amLODIPine 5 mg oral tablet: 1 tab(s) orally once a day  cloNIDine 0.1 mg oral tablet: 1 tab(s) orally 2 times a day   fluconazole 200 mg oral tablet: 2 tab(s) orally once a day   omeprazole 20 mg oral delayed release capsule: 1 cap(s) orally 2 times a day  oxyCODONE 5 mg oral tablet: 1 tab(s) orally every 6 hours, As Needed -for severe pain MDD:4
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
## 2021-08-13 NOTE — DISCHARGE NOTE NURSING/CASE MANAGEMENT/SOCIAL WORK - PATIENT PORTAL LINK FT
You can access the FollowMyHealth Patient Portal offered by [facility] by registering at the following website: [email or web address]. By joining [employer]’s FollowMyHealth portal, you will also be able to view your health information using other applications (apps) compatible with our system.

## 2021-08-13 NOTE — DISCHARGE NOTE PROVIDER - NSDCCPCAREPLAN_GEN_ALL_CORE_FT
PRINCIPAL DISCHARGE DIAGNOSIS  Diagnosis: Pancreatic tumor  Assessment and Plan of Treatment:
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
## 2021-08-13 NOTE — PROGRESS NOTE ADULT - PROVIDER SPECIALTY LIST ADULT
Anesthesia
Pain Medicine
Surgery
Pain Medicine
SICU
Surgery
Pain Medicine
Pain Medicine
Surgery
SICU
Surgery

## 2021-08-13 NOTE — DISCHARGE NOTE PROVIDER - NSDCDCMDCOMP_GEN_ALL_CORE
Current Eye Medications:  Latanoprost at bedtime both eyes, last took at midnight.     Subjective:  3 week follow up for IOP check. Vision is doing well, unchanged both eyes. No eye pain or discomfort in either eye.      Objective:  See Ophthalmology Exam.       Assessment:  Good initial reduction in intraocular pressure both eyes with Latanoprost.      Plan:  Continue using Latanoprost (green top) both eyes at bedtime.  Return Visit in 6 months for Complete Exam.     Regis Castillo M.D.  [phone]            This document is complete and the patient is ready for discharge.

## 2021-08-13 NOTE — DISCHARGE NOTE PROVIDER - NSDCFUADDINST_GEN_ALL_CORE_FT
WOUND CARE: Please keep incisions clean and dry.  Please do not scrub or rub incisions.  Do not use lotion or powder on incisions.   BATHING: Please do not submerge wound underwater.  You may shower and / or sponge bathe.  ACTIVITY: No heavy lifting or straining.  Otherwise, you may return to your usual level of physical activity.  If you are taking narcotic pain medication (such as Oxycodone or Percocet) DO NOT drive a car, operate machinery or make important decisions.  DIET: Resume a soft diet as tolerated.  NOTIFY YOUR SURGEON IF: You have any bleeding that does not stop, any pus draining from your wound(s), any fever (over 100.4 F) or chills, persistent nausea / vomiting, persistent diarrhea or if your pain is not controlled on your discharge pain medications.  FOLLOW-UP: Please follow up with your primary care physician in one week regarding your hospitalization.  Please follow-up with your surgeon, Dr. Johnson within 7 days following discharge.  Please call [phone] to schedule an appointment.    You will be discharged with a DIANA drain.  You will need to empty it and record output accurately. This was taught to you by the nursing staff prior to discharge from the hospital.  Please do not remove the DIANA drain.  It will be removed in the office.  Please bring accurate records of output to the office at your follow up appointment.
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
## 2021-08-13 NOTE — DISCHARGE NOTE PROVIDER - NSDCCPTREATMENT_GEN_ALL_CORE_FT
PRINCIPAL PROCEDURE  Procedure: Whipple procedure, robot-assisted, laparoscopic  Findings and Treatment:

## 2021-08-13 NOTE — DISCHARGE NOTE PROVIDER - CARE PROVIDER_API CALL
Tj Johnson)  Complex General Surgical Oncology; Surgery; Surgical Oncology  [address] [place] [address]  Phone: [phone]  Fax: [phone]  Follow Up Time: 1 week

## 2021-08-26 NOTE — CONSULT LETTER
[Dear  ___] : Dear  [unfilled], [Courtesy Letter:] : I had the pleasure of seeing your patient, [unfilled], in my office today. [Please see my note below.] : Please see my note below. [Consult Closing:] : Thank you very much for allowing me to participate in the care of this patient.  If you have any questions, please do not hesitate to contact me. [Sincerely,] : Sincerely, [FreeTextEntry3] : Tj Johnson MD, MPH, FACS, FSSO\par , [facility] General Surgical Oncology Fellowship\par [facility]\par Associate Professor of Surgery\par [employer] [facility]  [DrCathy  ___] : Dr. MORTON
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
## 2021-08-26 NOTE — PHYSICAL EXAM
[Normal] : well developed, well nourished, in no acute distress [de-identified] : healing trocar sites and midline abdominal incision with no evidence of infection;  Right abdominal DIANA with yellow output - removed; soft NT

## 2021-08-26 NOTE — ASSESSMENT
[FreeTextEntry1] : IMP:\par 67 y/o male who presented to [facility] with symptoms of painless jaundice. Elevated LFT's and CA 19-9 levels. CT scan 7/2/21 revealed infiltrating pancreatic mass in the pancreatic head with obstruction and dilatation of the biliary tree and pancreatic duct, favored to represent pancreatic adenocarcinoma.  \par EGD/EUS 7/3/21: pathology pending.\par ERCP 7/3/21: bile duct stent placed \par \par ***SURGERY 8/6/2021-  s/p robotic assisted laparoscopic whipple, converted to open on 8/6/2021\par ***FINAL PATHOLOGY- 3.8 x 3.4 x 2.7 cm Invasive adenocarcinoma, moderately to poorly differentiated, see synoptic summary;  14/21 lymph nodes positive for metastatic carcinoma;  AJCC 8th edition stage pT2N2 \par \par 8/26/2021- Patients states he felt well for the 1st few days at home.  Around 8/14, pt. states he started to develop N/V if he ate too much.  He states he is only able to tolerate small, soft meals.  If he eats a big meal or meat, he feels nausea and vomits.  States he has had 3-4 episodes of vomiting since discharge.  States he has been eating applesauce, mashed potatoes, chicken broth, scrambled eggs.  He states he is unable to tolerate any meat.  Does not take any antinausea medications. Denies abdominal pain.  Denies fever or chills.  He has not been monitoring DIANA output but states he empties a nearly full DIANA bulb approx. 3-4x/day.  Reports fatigue/energy loss.  States "I feed my cats and then i go back to bed.  I don't sleep but i rest a lot which is not like me".  He also feels very dry. States he is only able to tolerate small amounts of water at a time. \par \par PLAN:\par 1) Referral to med-onc to discuss chemotherapy\par 2) DINAA removed\par 3) CBC, CMP now\par 4) RTO in 1 month

## 2021-08-26 NOTE — REASON FOR VISIT
[Pancreatic Cancer] : pancreatic cancer [Post-Op] : a post-op for [FreeTextEntry2] : s/p robotic assisted laparoscopic whipple, converted to open on 8/6/2021

## 2021-09-15 PROBLEM — Z86.79 HISTORY OF HYPERTENSION: Status: RESOLVED | Noted: 2021-01-01 | Resolved: 2021-01-01

## 2021-09-16 PROBLEM — Z86.19 HISTORY OF HEPATITIS C: Status: RESOLVED | Noted: 2021-01-01 | Resolved: 2021-01-01

## 2021-09-16 PROBLEM — B19.20 HEPATITIS C VIRUS: Status: ACTIVE | Noted: 2021-01-01

## 2021-09-16 PROBLEM — F41.9 ANXIETY: Status: ACTIVE | Noted: 2021-01-01

## 2021-09-16 PROBLEM — G47.00 INSOMNIA, UNSPECIFIED TYPE: Status: ACTIVE | Noted: 2021-01-01
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
## 2021-09-16 NOTE — PHYSICAL EXAM
[Restricted in physically strenuous activity but ambulatory and able to carry out work of a light or sedentary nature] : Status 1- Restricted in physically strenuous activity but ambulatory and able to carry out work of a light or sedentary nature, e.g., light house work, office work [Normal] : affect appropriate [de-identified] : anicteric [de-identified] : mildly distended, well-healed ex-lap wound, nontender, no guarding or rigidity

## 2021-09-16 NOTE — RESULTS/DATA
[FreeTextEntry1] : Ms. Pederson is a pleasant 66 year-old man with a recently diagnosed T2N2M0 (stage III) pancreatic adenocarcinoma status-post an R1 Whipple resection, here to establish medical oncology care.

## 2021-09-16 NOTE — HISTORY OF PRESENT ILLNESS
[T: ___] : T[unfilled] [N: ___] : N[unfilled] [M: ___] : M[unfilled] [AJCC Stage: ____] : AJCC Stage: [unfilled] [de-identified] : Mr Pederson was admitted to [facility] on July 2, 2021 with painless jaundice.  CT of the abdomen pelvis at that time revealed infiltrating pancreatic mass in the pancreatic head with obstruction and dilatation of the biliary tree and pancreatic duct, favoring pancreatic adenocarcinoma.  There is no evidence of vascular involvement or metastatic disease. X-ray of the chest was unremarkable.\par \par He underwent endoscopic ultrasound the next day, documenting a 2.7 cm pancreatic head mass. CA 19-9 at that time was 654.  The pancreatic duct was dilated in the neck and body. FNA tissue biopsies were performed. He likewise underwent ERCP at the same time, revealing a 3.5cm common bile duct stricture. A sphincterotomy was completed and a bile duct stent was placed.\par \par He was seen by Dr. Johnson who felt Jordy had potentially resectable disease. They reportedly discussed neoadjuvant chemotherapy, but Mr. Pederson was adamantly opposed to potentially toxic treatments.\par \par Dr. Hoffmann completed an open Whipple resection on August 6, 2021. A 3.8 x 3.4 x 2.7cm invasive pancreatic adenocarcinoma, moderately to poorly differentiated, was found. 14 out of a total of 42 lymph nodes were positive for carcinoma. Surgical margins were positive at the pancreatic neck, uncinate and hepatic duct margins. Lymphovascular and perineural invasion was present. Final staging was [id number] (stage III).\par \par Jordy has had a slow recovery from surgery, at first dealing with pain and nausea, which has now started to improve. He has gained back 5lbs, though remains about 20lbs lighter than his weight at diagnosis.

## 2021-09-16 NOTE — CONSULT LETTER
[Dear  ___] : Dear  [unfilled], [Consult Letter:] : I had the pleasure of evaluating your patient, [unfilled]. [Please see my note below.] : Please see my note below. [Consult Closing:] : Thank you very much for allowing me to participate in the care of this patient.  If you have any questions, please do not hesitate to contact me. [Sincerely,] : Sincerely, [FreeTextEntry2] : Dr. Tj Johnson [FreeTextEntry3] : Mich Boyd MD\par  [DrCathy  ___] : Dr. MORTON [DrCathy ___] : Dr. MORTON

## 2021-09-23 PROBLEM — C25.0 ADENOCARCINOMA OF HEAD OF PANCREAS: Status: ACTIVE | Noted: 2021-01-01

## 2021-09-26 NOTE — H&P ADULT - NSICDXPASTSURGICALHX_GEN_ALL_CORE_FT
PAST SURGICAL HISTORY:  History of biliary duct stent placement 7/3/2021 [facility]    History of Whipple procedure 9/6/2021

## 2021-09-26 NOTE — H&P ADULT - NSHPPHYSICALEXAM_GEN_ALL_CORE
PHYSICAL EXAM  GENERAL: NAD, lying in bed with mild discomfort  CHEST: nonlabored, no increased WOB  ABDOMEN: Soft, Nontender, Nondistended. Incision site clean, dry with no erythema or induration.  EXTREMITIES: Well perfused. No clubbing, cyanosis, or edema  NERVOUS SYSTEM:  Alert & Oriented X3
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
## 2021-09-26 NOTE — H&P ADULT - NSHPSOCIALHISTORY_GEN_ALL_CORE
Former smoker for 25 years: 2PPD and quit in 2007. Denies current alcohol or drug abuse; former EtOH abuse.

## 2021-09-26 NOTE — H&P ADULT - HISTORY OF PRESENT ILLNESS
66M w/PMHx of HTN, GERD and PSHx of robot-assisted laparoscopic whipple, converted to open 9/6 being transferred from [facility] to [facility] due to worsening nausea and vomiting over the last 5 days. Pt endorses a worsening in appetite and has had decreased PO intake over the last week 2/2 to fear of vomiting. Pt notes emesis to be bilious w/o evidence of blood. Pt denies abdominal and chest pain, fevers, chills, SOB, dizziness.  66M w/PMHx of HTN, GERD, and recently diagnosed pancreatic mass, s/p robot converted to open Whipple on 9/6 who was transferred from [facility] to [facility] due to worsening nausea and vomiting over the last 5 days. Pt endorses a worsening in appetite and has had decreased PO intake over the last week 2/2 to fear of vomiting. Pt notes emesis to be bilious w/o evidence of blood. Pt denies abdominal and chest pain, fevers, chills, SOB, dizziness. Patient was afebrile, hemodynamically stable on presentation. Labs significant for elevated LFTs (T.bili 7.2,  / , AlkPhos 267). CT AP at [facility] demonstrated extensive metastatic disease in the jude hepatis, celiac axis, and peritoneal cavity with likely liver metastases and severe intrahepatic biliary dilatation.

## 2021-09-26 NOTE — H&P ADULT - NSHPLABSRESULTS_GEN_ALL_CORE
LABS:                     RADIOLOGY, EKG & ADDITIONAL TESTS: Reviewed. LABS:           CT A/P 9/25/21:  IMPRESSION: Extensive metastatic disease in the jude hepatis, celiac axis, and peritoneal cavity with likely liver metastases and severe intrahepatic biliary dilatation.          RADIOLOGY, EKG & ADDITIONAL TESTS: Reviewed. LABS:              10.8   5.79  )-----------( 211      ( 26 Sep 2021 01:24 )             30.3     132<L>  |  97<L>  |  17  ----------------------------<  126<H>  3.0<L>   |  20<L>  |  0.55    Ca    9.6      26 Sep 2021 01:24  Phos  1.2     09-26  Mg     1.70     09-26    TPro  6.2  /  Alb  3.2<L>  /  TBili  7.2<H>  /  DBili  x   /  AST  388<H>  /  ALT  363<H>  /  AlkPhos  267<H>  09-26    ----  CT AP ([facility]) - 9/25    FINDINGS:  LOWER CHEST: Within normal limits.    LIVER: There is new lobular enhancing soft tissue in the jude hepatis, measuring up to 2.2 cm on image 29, concerning for metastatic adenopathy, inseparable from the jejunal bypass loop and celiac axis. There are new hypoattenuating peripherally enhancing lesions in the left lobe of the liver measuring up to 3.3 cm, with mass effect upon the adjacent vessels and biliary tree, which may represent metastases or, less likely, abscesses. The hepatic veins and portal vein remain patent.  BILE DUCTS: There is severe intrahepatic biliary dilatation extending to the jude hepatis.  GALLBLADDER: Removed.  SPLEEN: Within normal limits. Small accessory spleen.  PANCREAS: Status post Whipple. Pancreatic duct stent in place. The pancreatic body and tail are unremarkable in appearance and enhancement.  ADRENALS: Within normal limits.  KIDNEYS/URETERS: Within normal limits.    BLADDER: Within normal limits.  REPRODUCTIVE ORGANS: The prostate is not enlarged    BOWEL: Status post Whipple. Sigmoid diverticulosis. No bowel obstruction. Appendix is normal.  PERITONEUM: There is a rounded 2.6 cm hypoattenuating lesion in the anterior midline abdomen on image 57, adjacent to small bowel, concerning for implant. There is a 2.4 x 2.4 cm heterogeneously hypoenhancing lesion to the left of the SMA on image 42, also suspicious for metastatic implant. There is an enhancing 2.0 x 1.3 cm lesion in Morison's pouch, also suspicious for implants. There is additional soft tissue nodularity throughout the upper abdomen, also suspicious for implants.  VESSELS: Within normal limits.  RETROPERITONEUM/LYMPH NODES: No lymphadenopathy.  ABDOMINAL WALL: Within normal limits.  BONES: Within normal limits.    IMPRESSION: Extensive metastatic disease in the jude hepatis, celiac axis, and peritoneal cavity with likely liver metastases and severe intrahepatic biliary dilatation. Results discussed with Dr. Lo at time of interpretation.        RADIOLOGY, EKG & ADDITIONAL TESTS: Reviewed.

## 2021-09-26 NOTE — PATIENT PROFILE ADULT - NSPROGENBLOODRESTRICT_GEN_A_NUR
none Simponi Pregnancy And Lactation Text: The risk during pregnancy and breastfeeding is uncertain with this medication.

## 2021-09-26 NOTE — H&P ADULT - ASSESSMENT
Assessment: 66M with PMH of HTN, GERD and PSHx of Robotic Whipple converted to open from 9/6/21 presenting with worsening nausea and vomiting POD 20. Imaging is consistent with rapid recurrence and obstruction of the intestinal tract.     Painless jaundice  - high suspicion for rapid recurrence of pancreatic cancer  - D: CLD  - IVF  - F/u labs  - CT abdomen/pelvis:     Surgery D   [id number] Assessment: 66M with PMH of HTN, GERD and PSHx of Robotic Whipple converted to open from 9/6/21 presenting with worsening nausea and vomiting POD 20. Imaging is consistent with rapid recurrence and biliary obstruction.     Painless jaundice  - high suspicion for rapid recurrence of pancreatic cancer  - D: CLD  - IVF  - F/u labs  - CT abdomen/pelvis: extensive metastatic disease in the jude hepatis, celiac axis, and peritoneal cavity with likely liver metastases and severe intrahepatic biliary dilatation.      Surgery D   [id number] Assessment: 66M with PMH of HTN, GERD and PSHx of Robotic Whipple converted to open from 9/6/21 presenting with worsening nausea and vomiting POD 20. Imaging is consistent with high suspicion for rapid recurrence and biliary obstruction.     Plan:  - high suspicion for rapid recurrence of pancreatic cancer  - D: CLD  - IVF  - F/u labs  - CT abdomen/pelvis: extensive metastatic disease in the jude hepatis, celiac axis, and peritoneal cavity with likely liver metastases and severe intrahepatic biliary dilatation.      Surgery D   [id number] 66M with Hx HTN, GERD, and recently diagnosed pancreatic mass, s/p robot converted to open Whipple on 9/6/21 transferred from [facility] for elevated LFTs, nausea, and emesis, found to have extensive metastatic disease in post-surgical site, likely due to rapid recurrence, with c/f biliary obstruction.     PLAN:  - Admit to D Team Surgery, Dr. Richter (Dr. Johnson is operating surgeon, change nameplate in AM)  - Diet: CLD  - IVF: NS @ 125 due to hyponatremia  - Replete electrolytes PRN, f/u post-repletion BMP  - Pain control PRN -- patient currently not reporting pain  - VTE ppx: Lovenox     Discussed with Dr. Neelam Ascencio, PGY-1  Tish Lopez, PGY-2  D Team Surgery  #[id number]  66M with Hx HTN, GERD, and recently diagnosed pancreatic mass, s/p robot converted to open Whipple on 9/6/21 transferred from [facility] for elevated LFTs, nausea, and emesis, found to have extensive metastatic disease in post-surgical site, likely due to rapid recurrence, with c/f biliary obstruction.     PLAN:  - Admit to D Team Surgery, Dr. Richter (Dr. Johnson is operating surgeon, change nameplate in AM)  - Diet: CLD  - IVF: NS @ 125 due to hyponatremia  - Replete electrolytes PRN, f/u post-repletion BMP  - Pain control PRN -- patient currently not reporting pain  - VTE ppx: Lovenox   - C/w home meds: clonidine 0.1mg BID with hold parameters    Discussed with Dr. Neelam Ascencio, PGY-1  Tish Lopez, PGY-2  D Team Surgery  #[id number]

## 2021-09-27 NOTE — CONSULT NOTE ADULT - ASSESSMENT
66 year old man with pancreatic head adenocarcinoma (diagnosed 07/2021 without vascular involvement) s/p robotic converted to open Whipple procedure on 9/6/21 without catarina-adjuvant chemotherapy, who was transferred from [facility] to [facility] for painless jaundice with evidence of recurrent malignancy and biliary obstruction    Impression:    # Intrahepatic & extrahepatic biliary ductal dilatation - Suspect biliary obstruction due to local recurrence of malignancy. Painless jaundice, afebrile, no leukocytosis - low suspicion for cholangitis at this time.   # Pancreatic adenocarcinoma s/p Whipple with likely recurrence - Implants involving upper abdomen, celiac axis and SMA; not yet started on chemotherapy  # History of mid-CBD stricture s/p stenting with stent removal during Whipple in 09/06/21    Recommendations:  - will discuss feasibility of ERCP for biliary stenting given post-surgical anatomy with Advanced GI Attending  - trend liver enzymes and total bilirubin on daily CMP  - monitor for fevers/start IV antibiotics if febrile  - diet as tolerated/per Surgical Oncology      Lidia Fry PGY-6  Gastroenterology/Hepatology Fellow  Page #[phone]   Page #[phone] 5pm-7am on weekdays, and on weekends

## 2021-09-27 NOTE — CONSULT NOTE ADULT - SUBJECTIVE AND OBJECTIVE BOX
REASON FOR CONSULTATION: Pancreatic cancer     HPI: 66M w/PMHx of HTN, GERD, and recently diagnosed pancreatic mass, s/p robot converted to open Whipple on 9/6 who was transferred from [facility] to [facility] due to worsening nausea and vomiting over the last 5 days. Pt endorses a worsening in appetite and has had decreased PO intake over the last week 2/2 to fear of vomiting. Pt notes emesis to be bilious w/o evidence of blood. Pt denies abdominal and chest pain, fevers, chills, SOB, dizziness. Patient was afebrile, hemodynamically stable on presentation. Labs significant for elevated LFTs (T.bili 7.2,  / , AlkPhos 267). CT AP at [facility] demonstrated extensive metastatic disease in the jude hepatis, celiac axis, and peritoneal cavity with likely liver metastases and severe intrahepatic biliary dilatation.     REVIEW OF SYSTEMS:      Allergies    No Known Allergies    Intolerances    hydrALAZINE (Vomiting)  hydrALAZINE (Vomiting; Nausea)      MEDICATIONS  (STANDING):  cloNIDine 0.1 milliGRAM(s) Oral two times a day  enoxaparin Injectable 40 milliGRAM(s) SubCutaneous daily  potassium chloride    Tablet ER 40 milliEquivalent(s) Oral every 4 hours  potassium phosphate / sodium phosphate Powder (PHOS-NaK) 1 Packet(s) Oral every 8 hours  potassium phosphate IVPB 30 milliMole(s) IV Intermittent every 6 hours  sodium chloride 1 Gram(s) Oral every 8 hours  sodium chloride 0.9% 1000 milliLiter(s) (100 mL/Hr) IV Continuous <Continuous>    MEDICATIONS  (PRN):      Vital Signs Last 24 Hrs  T(C): 37.7 (27 Sep 2021 05:00), Max: 37.8 (26 Sep 2021 23:55)  T(F): 99.9 (27 Sep 2021 05:00), Max: 100 (26 Sep 2021 23:55)  HR: 115 (27 Sep 2021 05:00) (91 - 115)  BP: 97/51 (27 Sep 2021 05:00) (97/51 - 126/70)  BP(mean): --  RR: 18 (27 Sep 2021 05:00) (16 - 18)  SpO2: 95% (27 Sep 2021 05:00) (94% - 100%)    PHYSICAL EXAM:        LABS:                        10.5   8.26  )-----------( 117      ( 27 Sep 2021 07:17 )             29.6     09-27    131<L>  |  100  |  13  ----------------------------<  102<H>  3.3<L>   |  20<L>  |  0.52    Ca    8.9      27 Sep 2021 07:17  Phos  0.8     09-27  Mg     1.80     09-27    TPro  x   /  Alb  x   /  TBili  x   /  DBili  7.0<H>  /  AST  x   /  ALT  x   /  AlkPhos  x   09-27    PT/INR - ( 27 Sep 2021 07:17 )   PT: 16.2 sec;   INR: 1.45 ratio         PTT - ( 26 Sep 2021 01:24 )  PTT:28.8 sec          RADIOLOGY & ADDITIONAL STUDIES:    PATHOLOGY:       REASON FOR CONSULTATION: Pancreatic cancer     HPI: 66M w/PMHx of HTN, GERD, and recently diagnosed pancreatic mass, s/p robot converted to open Whipple on 8/6/21 who was transferred from [facility] to [facility] due to worsening nausea and vomiting over the last 5 days. Pt endorses a worsening in appetite and has had decreased PO intake over the last week 2/2 to fear of vomiting. Pt notes emesis to be bilious w/o evidence of blood. Pt denies abdominal and chest pain, fevers, chills, SOB, dizziness. Patient was afebrile, hemodynamically stable on presentation. Labs significant for elevated LFTs (T.bili 7.2,  / , AlkPhos 267). CT AP at [facility] demonstrated extensive metastatic disease in the jude hepatis, celiac axis, and peritoneal cavity with likely liver metastases and severe intrahepatic biliary dilatation.     REVIEW OF SYSTEMS:      Allergies    No Known Allergies    Intolerances    hydrALAZINE (Vomiting)  hydrALAZINE (Vomiting; Nausea)      MEDICATIONS  (STANDING):  cloNIDine 0.1 milliGRAM(s) Oral two times a day  enoxaparin Injectable 40 milliGRAM(s) SubCutaneous daily  potassium chloride    Tablet ER 40 milliEquivalent(s) Oral every 4 hours  potassium phosphate / sodium phosphate Powder (PHOS-NaK) 1 Packet(s) Oral every 8 hours  potassium phosphate IVPB 30 milliMole(s) IV Intermittent every 6 hours  sodium chloride 1 Gram(s) Oral every 8 hours  sodium chloride 0.9% 1000 milliLiter(s) (100 mL/Hr) IV Continuous <Continuous>    MEDICATIONS  (PRN):      Vital Signs Last 24 Hrs  T(C): 37.7 (27 Sep 2021 05:00), Max: 37.8 (26 Sep 2021 23:55)  T(F): 99.9 (27 Sep 2021 05:00), Max: 100 (26 Sep 2021 23:55)  HR: 115 (27 Sep 2021 05:00) (91 - 115)  BP: 97/51 (27 Sep 2021 05:00) (97/51 - 126/70)  BP(mean): --  RR: 18 (27 Sep 2021 05:00) (16 - 18)  SpO2: 95% (27 Sep 2021 05:00) (94% - 100%)    PHYSICAL EXAM:        LABS:                        10.5   8.26  )-----------( 117      ( 27 Sep 2021 07:17 )             29.6     09-27    131<L>  |  100  |  13  ----------------------------<  102<H>  3.3<L>   |  20<L>  |  0.52    Ca    8.9      27 Sep 2021 07:17  Phos  0.8     09-27  Mg     1.80     09-27    TPro  x   /  Alb  x   /  TBili  x   /  DBili  7.0<H>  /  AST  x   /  ALT  x   /  AlkPhos  x   09-27    PT/INR - ( 27 Sep 2021 07:17 )   PT: 16.2 sec;   INR: 1.45 ratio         PTT - ( 26 Sep 2021 01:24 )  PTT:28.8 sec          RADIOLOGY & ADDITIONAL STUDIES:    CT A/P performed at [facility], Report and images in PACS:    IMPRESSION: Extensive metastatic disease in the jude hepatis, celiac axis, and peritoneal cavity with likely liver metastases and severe intrahepatic biliary dilatation.       PATHOLOGY:  pSurgical Pathology Report:   ACCESSION No: [id number]   MARIA EUGENIA CLARKE   Surgical Final Report   Final Diagnosis   1. Lymph node, common hepatic artery, excision   - Metastatic adenocarcinoma present in one out of seven   lymph nodes (1/7)   2. Gallbladder, cholecystectomy   - Acute and chronic cholecystitis   - Cholelithiasis   3. Common bile duct stent, removal   - Gross only   4. Duodenum, head of pancreas, common bile duct,   pancreaticoduodenectomy   - Invasive adenocarcinoma, moderately to poorly   differentiated, see synoptic summary   - Nine out of twenty-two lymph nodes, positive for carcinoma   (9/22)   - AJCC 8th edition stage pT2N2   5. Lymph node, portocaval, excision:   - One lymph node, positive for metastatic carcinoma (1/1)   6. Lymph node, SMA superior, excision:   - Three out of twelve lymph nodes, positive for metastatic   carcinoma (3/12)   7. Cystic duct stump, excision:   - Cystic duct with lymphoid infiltration and fibrosis,   negative for   carcinoma   Verifiedby: Prema James MD   (Electronic Signature)   Reported on: 08/19/21 18:12 EDT, [address] [facility] [place] [address]   Phone: [phone] Fax: [phone]   _________________________________________________________________      REASON FOR CONSULTATION: Pancreatic cancer     HPI: 66M w/PMHx of HTN, GERD, and recently diagnosed pancreatic mass, s/p robot converted to open Whipple on 8/6/21 who was transferred from [facility] to [facility] due to worsening nausea and vomiting over the last 5 days. Pt endorses a worsening in appetite and has had decreased PO intake over the last week 2/2 to fear of vomiting. Pt notes emesis to be bilious w/o evidence of blood. Pt denies abdominal and chest pain, fevers, chills, SOB, dizziness. Patient was afebrile, hemodynamically stable on presentation. Labs significant for elevated LFTs (T.bili 7.2,  / , AlkPhos 267). CT AP at [facility] demonstrated extensive metastatic disease in the jude hepatis, celiac axis, and peritoneal cavity with likely liver metastases and severe intrahepatic biliary dilatation.     REVIEW OF SYSTEMS:      Allergies    No Known Allergies    Intolerances    hydrALAZINE (Vomiting)  hydrALAZINE (Vomiting; Nausea)      MEDICATIONS  (STANDING):  cloNIDine 0.1 milliGRAM(s) Oral two times a day  enoxaparin Injectable 40 milliGRAM(s) SubCutaneous daily  potassium chloride    Tablet ER 40 milliEquivalent(s) Oral every 4 hours  potassium phosphate / sodium phosphate Powder (PHOS-NaK) 1 Packet(s) Oral every 8 hours  potassium phosphate IVPB 30 milliMole(s) IV Intermittent every 6 hours  sodium chloride 1 Gram(s) Oral every 8 hours  sodium chloride 0.9% 1000 milliLiter(s) (100 mL/Hr) IV Continuous <Continuous>    MEDICATIONS  (PRN):      Vital Signs Last 24 Hrs  T(C): 37.7 (27 Sep 2021 05:00), Max: 37.8 (26 Sep 2021 23:55)  T(F): 99.9 (27 Sep 2021 05:00), Max: 100 (26 Sep 2021 23:55)  HR: 115 (27 Sep 2021 05:00) (91 - 115)  BP: 97/51 (27 Sep 2021 05:00) (97/51 - 126/70)  BP(mean): --  RR: 18 (27 Sep 2021 05:00) (16 - 18)  SpO2: 95% (27 Sep 2021 05:00) (94% - 100%)    PHYSICAL EXAM:        LABS:                        10.5   8.26  )-----------( 117      ( 27 Sep 2021 07:17 )             29.6     09-27    131<L>  |  100  |  13  ----------------------------<  102<H>  3.3<L>   |  20<L>  |  0.52    Ca    8.9      27 Sep 2021 07:17  Phos  0.8     09-27  Mg     1.80     09-27    TPro  x   /  Alb  x   /  TBili  x   /  DBili  7.0<H>  /  AST  x   /  ALT  x   /  AlkPhos  x   09-27    PT/INR - ( 27 Sep 2021 07:17 )   PT: 16.2 sec;   INR: 1.45 ratio         PTT - ( 26 Sep 2021 01:24 )  PTT:28.8 sec          RADIOLOGY & ADDITIONAL STUDIES:    CT A/P performed at [facility], Report and images in PACS:    IMPRESSION: Extensive metastatic disease in the jude hepatis, celiac axis, and peritoneal cavity with likely liver metastases and severe intrahepatic biliary dilatation.       PATHOLOGY:  Surgical Pathology Report:   ACCESSION No: [id number]   MARIA EUGENIA CLARKE   Surgical Final Report   Final Diagnosis   1. Lymph node, common hepatic artery, excision   - Metastatic adenocarcinoma present in one out of seven   lymph nodes (1/7)   2. Gallbladder, cholecystectomy   - Acute and chronic cholecystitis   - Cholelithiasis   3. Common bile duct stent, removal   - Gross only   4. Duodenum, head of pancreas, common bile duct,   pancreaticoduodenectomy   - Invasive adenocarcinoma, moderately to poorly   differentiated, see synoptic summary   - Nine out of twenty-two lymph nodes, positive for carcinoma   (9/22)   - AJCC 8th edition stage pT2N2   5. Lymph node, portocaval, excision:   - One lymph node, positive for metastatic carcinoma (1/1)   6. Lymph node, SMA superior, excision:   - Three out of twelve lymph nodes, positive for metastatic   carcinoma (3/12)   7. Cystic duct stump, excision:   - Cystic duct with lymphoid infiltration and fibrosis,   negative for   carcinoma   Verifiedby: Prema James MD   (Electronic Signature)   Reported on: 08/19/21 18:12 EDT, [address] [facility] [place] [address]   Phone: [phone] Fax: [phone]   _________________________________________________________________      REASON FOR CONSULTATION: Pancreatic cancer     HPI: 66M w/PMHx of HTN, GERD, and recently diagnosed pancreatic mass, s/p robot converted to open Whipple on 8/6/21 who was transferred from [facility] to [facility] due to worsening nausea and vomiting over the last 5 days. Pt endorses a worsening in appetite and has had decreased PO intake over the last week 2/2 to fear of vomiting. Pt notes emesis to be bilious w/o evidence of blood. Pt denies abdominal and chest pain, fevers, chills, SOB, dizziness. Patient was afebrile, hemodynamically stable on presentation. Labs significant for elevated LFTs (T.bili 7.2,  / , AlkPhos 267). CT AP at [facility] demonstrated extensive metastatic disease in the jude hepatis, celiac axis, and peritoneal cavity with likely liver metastases and severe intrahepatic biliary dilatation.   Per outpatient [employer] review, pt has established care with Dr. Boyd at [facility] in [place]. Pt was diagnosed as Stage III s/p R1 Whipple resection after refusing neoadjuvant treatment. Patient states he was planned to have port placement this week and start chemotherapy. Per notes, pt was planned to start 12 cycles of modified FOLFIRINOX.   Pt reports improved nausea and vomiting at this time. He denies any abdominal pain. Endorses normal bowel movements.     REVIEW OF SYSTEMS:    CONSTITUTIONAL: No weakness, fevers or chills  EYES/ENT: +Scleral icterus   NECK: No pain or stiffness  RESPIRATORY: No cough, wheezing, hemoptysis; No shortness of breath  CARDIOVASCULAR: No chest pain or palpitations  GASTROINTESTINAL: + nausea, vomiting, now improved. No diarrhea or constipation. No melena or hematochezia.  GENITOURINARY: No dysuria, frequency or hematuria  NEUROLOGICAL: No numbness or weakness  SKIN: No itching, burning, rashes, or lesions   All other review of systems is negative unless indicated above.    Allergies    No Known Allergies    Intolerances    hydrALAZINE (Vomiting)  hydrALAZINE (Vomiting; Nausea)      MEDICATIONS  (STANDING):  cloNIDine 0.1 milliGRAM(s) Oral two times a day  enoxaparin Injectable 40 milliGRAM(s) SubCutaneous daily  potassium chloride    Tablet ER 40 milliEquivalent(s) Oral every 4 hours  potassium phosphate / sodium phosphate Powder (PHOS-NaK) 1 Packet(s) Oral every 8 hours  potassium phosphate IVPB 30 milliMole(s) IV Intermittent every 6 hours  sodium chloride 1 Gram(s) Oral every 8 hours  sodium chloride 0.9% 1000 milliLiter(s) (100 mL/Hr) IV Continuous <Continuous>    MEDICATIONS  (PRN):      Vital Signs Last 24 Hrs  T(C): 37.7 (27 Sep 2021 05:00), Max: 37.8 (26 Sep 2021 23:55)  T(F): 99.9 (27 Sep 2021 05:00), Max: 100 (26 Sep 2021 23:55)  HR: 115 (27 Sep 2021 05:00) (91 - 115)  BP: 97/51 (27 Sep 2021 05:00) (97/51 - 126/70)  BP(mean): --  RR: 18 (27 Sep 2021 05:00) (16 - 18)  SpO2: 95% (27 Sep 2021 05:00) (94% - 100%)    PHYSICAL EXAM:  GENERAL: NAD + jaundice   HEAD:  Atraumatic, Normocephalic  EYES: +Scleral icterus   ENMT: No tonsillar erythema, exudates, or enlargement; Moist mucous membranes, Good dentition, No lesions  NECK: Supple, No JVD, Normal thyroid  CHEST/LUNG: Clear to percussion bilaterally; No rales, rhonchi, wheezing, or rubs  HEART: Regular rate and rhythm; No murmurs, rubs, or gallops  ABDOMEN: Soft, distended but Nontender, Bowel sounds present  VASCULAR:  2+ Peripheral Pulses, No clubbing, cyanosis, or edema  LYMPH: No lymphadenopathy noted  SKIN: No rashes or lesions  NERVOUS SYSTEM:  Alert & Oriented X3    LABS:                        10.5   8.26  )-----------( 117      ( 27 Sep 2021 07:17 )             29.6     09-27    131<L>  |  100  |  13  ----------------------------<  102<H>  3.3<L>   |  20<L>  |  0.52    Ca    8.9      27 Sep 2021 07:17  Phos  0.8     09-27  Mg     1.80     09-27    TPro  x   /  Alb  x   /  TBili  x   /  DBili  7.0<H>  /  AST  x   /  ALT  x   /  AlkPhos  x   09-27    PT/INR - ( 27 Sep 2021 07:17 )   PT: 16.2 sec;   INR: 1.45 ratio         PTT - ( 26 Sep 2021 01:24 )  PTT:28.8 sec          RADIOLOGY & ADDITIONAL STUDIES:    CT A/P performed at [facility], Report and images in PACS:    IMPRESSION: Extensive metastatic disease in the jude hepatis, celiac axis, and peritoneal cavity with likely liver metastases and severe intrahepatic biliary dilatation.       PATHOLOGY:  Surgical Pathology Report:   ACCESSION No: [id number]   MARIA EUGENIA CLARKE   Surgical Final Report   Final Diagnosis   1. Lymph node, common hepatic artery, excision   - Metastatic adenocarcinoma present in one out of seven   lymph nodes (1/7)   2. Gallbladder, cholecystectomy   - Acute and chronic cholecystitis   - Cholelithiasis   3. Common bile duct stent, removal   - Gross only   4. Duodenum, head of pancreas, common bile duct,   pancreaticoduodenectomy   - Invasive adenocarcinoma, moderately to poorly   differentiated, see synoptic summary   - Nine out of twenty-two lymph nodes, positive for carcinoma   (9/22)   - AJCC 8th edition stage pT2N2   5. Lymph node, portocaval, excision:   - One lymph node, positive for metastatic carcinoma (1/1)   6. Lymph node, SMA superior, excision:   - Three out of twelve lymph nodes, positive for metastatic   carcinoma (3/12)   7. Cystic duct stump, excision:   - Cystic duct with lymphoid infiltration and fibrosis,   negative for   carcinoma   Verifiedby: Prema James MD   (Electronic Signature)   Reported on: 08/19/21 18:12 EDT, [address] [facility] [place] [address]   Phone: [phone] Fax: [phone]   _________________________________________________________________      REASON FOR CONSULTATION: Pancreatic cancer     HPI: 66M w/PMHx of HTN, GERD, and recently diagnosed pancreatic mass, s/p robot converted to open Whipple on 8/6/21 who was transferred from [facility] to [facility] due to worsening nausea and vomiting over the last 5 days. Pt endorses a worsening in appetite and has had decreased PO intake over the last week 2/2 to fear of vomiting. Pt notes emesis to be bilious w/o evidence of blood. Pt denies abdominal and chest pain, fevers, chills, SOB, dizziness. Patient was afebrile, hemodynamically stable on presentation. Labs significant for elevated LFTs (T.bili 7.2,  / , AlkPhos 267). CT AP at [facility] demonstrated extensive metastatic disease in the jude hepatis, celiac axis, and peritoneal cavity with likely liver metastases and severe intrahepatic biliary dilatation.   Per outpatient [employer] review, pt has established care with Dr. Boyd at [facility] in [place]. Pt was diagnosed as Stage III s/p R1 Whipple resection after refusing neoadjuvant treatment. Patient states he was planned to have port placement this week, pt was still deciding which chemotherapy regimen to undergo as he has been reluctant to start.    Pt reports improved nausea and vomiting at this time. He denies any abdominal pain. Endorses normal bowel movements.     REVIEW OF SYSTEMS:    CONSTITUTIONAL: No weakness, fevers or chills  EYES/ENT: +Scleral icterus   NECK: No pain or stiffness  RESPIRATORY: No cough, wheezing, hemoptysis; No shortness of breath  CARDIOVASCULAR: No chest pain or palpitations  GASTROINTESTINAL: + nausea, vomiting, now improved. No diarrhea or constipation. No melena or hematochezia.  GENITOURINARY: No dysuria, frequency or hematuria  NEUROLOGICAL: No numbness or weakness  SKIN: No itching, burning, rashes, or lesions   All other review of systems is negative unless indicated above.    Allergies    No Known Allergies    Intolerances    hydrALAZINE (Vomiting)  hydrALAZINE (Vomiting; Nausea)      MEDICATIONS  (STANDING):  cloNIDine 0.1 milliGRAM(s) Oral two times a day  enoxaparin Injectable 40 milliGRAM(s) SubCutaneous daily  potassium chloride    Tablet ER 40 milliEquivalent(s) Oral every 4 hours  potassium phosphate / sodium phosphate Powder (PHOS-NaK) 1 Packet(s) Oral every 8 hours  potassium phosphate IVPB 30 milliMole(s) IV Intermittent every 6 hours  sodium chloride 1 Gram(s) Oral every 8 hours  sodium chloride 0.9% 1000 milliLiter(s) (100 mL/Hr) IV Continuous <Continuous>    MEDICATIONS  (PRN):      Vital Signs Last 24 Hrs  T(C): 37.7 (27 Sep 2021 05:00), Max: 37.8 (26 Sep 2021 23:55)  T(F): 99.9 (27 Sep 2021 05:00), Max: 100 (26 Sep 2021 23:55)  HR: 115 (27 Sep 2021 05:00) (91 - 115)  BP: 97/51 (27 Sep 2021 05:00) (97/51 - 126/70)  BP(mean): --  RR: 18 (27 Sep 2021 05:00) (16 - 18)  SpO2: 95% (27 Sep 2021 05:00) (94% - 100%)    PHYSICAL EXAM:  GENERAL: NAD + jaundice   HEAD:  Atraumatic, Normocephalic  EYES: +Scleral icterus   ENMT: No tonsillar erythema, exudates, or enlargement; Moist mucous membranes, Good dentition, No lesions  NECK: Supple, No JVD, Normal thyroid  CHEST/LUNG: Clear to percussion bilaterally; No rales, rhonchi, wheezing, or rubs  HEART: Regular rate and rhythm; No murmurs, rubs, or gallops  ABDOMEN: Soft, distended but Nontender, Bowel sounds present  VASCULAR:  2+ Peripheral Pulses, No clubbing, cyanosis, or edema  LYMPH: No lymphadenopathy noted  SKIN: No rashes or lesions  NERVOUS SYSTEM:  Alert & Oriented X3    LABS:                        10.5   8.26  )-----------( 117      ( 27 Sep 2021 07:17 )             29.6     09-27    131<L>  |  100  |  13  ----------------------------<  102<H>  3.3<L>   |  20<L>  |  0.52    Ca    8.9      27 Sep 2021 07:17  Phos  0.8     09-27  Mg     1.80     09-27    TPro  x   /  Alb  x   /  TBili  x   /  DBili  7.0<H>  /  AST  x   /  ALT  x   /  AlkPhos  x   09-27    PT/INR - ( 27 Sep 2021 07:17 )   PT: 16.2 sec;   INR: 1.45 ratio         PTT - ( 26 Sep 2021 01:24 )  PTT:28.8 sec          RADIOLOGY & ADDITIONAL STUDIES:    CT A/P performed at [facility], Report and images in PACS:    IMPRESSION: Extensive metastatic disease in the jude hepatis, celiac axis, and peritoneal cavity with likely liver metastases and severe intrahepatic biliary dilatation.       PATHOLOGY:  Surgical Pathology Report:   ACCESSION No: [id number]   MARIA EUGENIA CLARKE   Surgical Final Report   Final Diagnosis   1. Lymph node, common hepatic artery, excision   - Metastatic adenocarcinoma present in one out of seven   lymph nodes (1/7)   2. Gallbladder, cholecystectomy   - Acute and chronic cholecystitis   - Cholelithiasis   3. Common bile duct stent, removal   - Gross only   4. Duodenum, head of pancreas, common bile duct,   pancreaticoduodenectomy   - Invasive adenocarcinoma, moderately to poorly   differentiated, see synoptic summary   - Nine out of twenty-two lymph nodes, positive for carcinoma   (9/22)   - AJCC 8th edition stage pT2N2   5. Lymph node, portocaval, excision:   - One lymph node, positive for metastatic carcinoma (1/1)   6. Lymph node, SMA superior, excision:   - Three out of twelve lymph nodes, positive for metastatic   carcinoma (3/12)   7. Cystic duct stump, excision:   - Cystic duct with lymphoid infiltration and fibrosis,   negative for   carcinoma   Verifiedby: Prema James MD   (Electronic Signature)   Reported on: 08/19/21 18:12 EDT, [address] [facility] [place] [address]   Phone: [phone] Fax: [phone]   _________________________________________________________________

## 2021-09-27 NOTE — PROGRESS NOTE ADULT - SUBJECTIVE AND OBJECTIVE BOX
D TEAM Surgery Progress Note  Patient is a 66y old  Male who presents with a chief complaint of Nausea and Emesis (26 Sep 2021 00:09)      INTERVAL EVENTS: Patient is POD#52 s/p robot-assisted laparoscopic whipple, converted to open. No acute events overnight.    SUBJECTIVE: Patient seen and examined at bedside with surgical team, patient reports abdominal pain is better. Denies nausea, vomiting. Tolerating clears. Passing flatus, no bowel movement. Denies fever, chills, CP, SOB.     OBJECTIVE:    Vital Signs Last 24 Hrs  T(C): 37.7 (27 Sep 2021 05:00), Max: 37.8 (26 Sep 2021 23:55)  T(F): 99.9 (27 Sep 2021 05:00), Max: 100 (26 Sep 2021 23:55)  HR: 115 (27 Sep 2021 05:00) (91 - 115)  BP: 97/51 (27 Sep 2021 05:00) (97/51 - 126/70)  BP(mean): --  RR: 18 (27 Sep 2021 05:00) (16 - 18)  SpO2: 95% (27 Sep 2021 05:00) (94% - 100%)I&O's Detail    26 Sep 2021 07:01  -  27 Sep 2021 07:00  --------------------------------------------------------  IN:    Oral Fluid: 720 mL    sodium chloride 0.9%: 625 mL    sodium chloride 0.9% w/ Additives: 1200 mL  Total IN: 2545 mL    OUT:    Voided (mL): 1200 mL  Total OUT: 1200 mL    Total NET: 1345 mL      MEDICATIONS  (STANDING):  cloNIDine 0.1 milliGRAM(s) Oral two times a day  enoxaparin Injectable 40 milliGRAM(s) SubCutaneous daily  magnesium sulfate  IVPB 1 Gram(s) IV Intermittent once  potassium chloride    Tablet ER 40 milliEquivalent(s) Oral every 4 hours  sodium chloride 0.9% 1000 milliLiter(s) (100 mL/Hr) IV Continuous <Continuous>    MEDICATIONS  (PRN):      PHYSICAL EXAM:  Constitutional: A&Ox3, NAD  Respiratory: Unlabored breathing  Abdomen: Soft, nondistended, NTTP. No rebound or guarding. Midline scar well healed.  Extremities: WWP, RICHTER spontaneously    LABS:                        10.5   8.26  )-----------( x        ( 27 Sep 2021 07:17 )             29.6     09-27    131<L>  |  100  |  13  ----------------------------<  102<H>  3.3<L>   |  20<L>  |  0.52    Ca    8.9      27 Sep 2021 07:17  Phos  2.4     09-26  Mg     1.80     09-27    TPro  5.5<L>  /  Alb  2.7<L>  /  TBili  8.3<H>  /  DBili  x   /  AST  335<H>  /  ALT  242<H>  /  AlkPhos  264<H>  09-27    PT/INR - ( 27 Sep 2021 07:17 )   PT: 16.2 sec;   INR: 1.45 ratio         PTT - ( 26 Sep 2021 01:24 )  PTT:28.8 sec  LIVER FUNCTIONS - ( 27 Sep 2021 07:17 )  Alb: 2.7 g/dL / Pro: 5.5 g/dL / ALK PHOS: 264 U/L / ALT: 242 U/L / AST: 335 U/L / GGT: x

## 2021-09-27 NOTE — CONSULT NOTE ADULT - ASSESSMENT
66M w/PMHx of HTN, GERD, and recently diagnosed pancreatic mass, s/p robot converted to open Whipple on 8/6/21 who was transferred from [facility] to [facility] due to worsening nausea and vomiting over the last 5 found to have extensive metastatic disease on CT A/P at OSH    #Recently diagnosed Stage III pancreatic cancer, now suspected with metastatic disease      66M w/PMHx of HTN, GERD, and recently diagnosed pancreatic mass, s/p robot converted to open Whipple on 8/6/21 who was transferred from [facility] to [facility] due to worsening nausea and vomiting over the last 5 found to have extensive metastatic disease on CT A/P at OSH    #Recently diagnosed Stage III pancreatic cancer, now with suspected metastatic disease      66M w/PMHx of HTN, GERD, and recently diagnosed pancreatic mass, s/p robot converted to open Whipple on 8/6/21 who was transferred from [facility] to [facility] due to worsening nausea and vomiting over the last 5 found to have extensive metastatic disease on CT A/P at OSH    #Recently diagnosed Stage III pancreatic cancer, now with suspected metastatic disease   Pt initially admitted to [facility] in July 2021 with painless jaundice, found to have an infiltrating pancreatic mass in the pancreatic head with obstruction and dilatation of the biliary tree and pancreatic duct. He underwent endoscopic U/S, documenting a 2.7 cm pancreatic head mass s/p bx and sphincterotomy with placement of bile duct stent. Pt was opposed to neoadjuvant treatment and completed an open Whipple resection 8/6/21, 3.8 x 3.4 x 2.7cm invasive pancreatic adeno ca, moderately to poorly differentiated, 14/42 LN were positive, surgical margins were positive at the pancreatic neck, uncinate and hepatic duct margins. LVI and PNI was present. Final staging was [id number] (stage III)   The patient had discussions with his oncologist regarding starting adjuvant therapy with modified FOLFIRINOX vs gemcitabine and capecitabine as pt was hesitant to proceed with chemotherapy.    Ct A/P now showing suspected metastatic disease in the jude hepatis, celiac axis and peritoneal cavity with likely liver mets and severe intrahepatic biliary dilatation.    Please follow up GI recommendations given imaging and increasing bili c/f biliary obstruction   Please obtain CT chest w IV contrast to complete restaging scans   Recommend IR guided biopsy of the most accessible metastatic site to prove metastatic disease and establish stage IV cancer. Likely most accessible lesion would be liver lesion vs. Peritoneal nodule.   No plans for any inpatient systemic therapy.   Will update pt’s primary oncologist Dr. Boyd at [facility] who pt wishes to follow up with upon discharge.       Judy Medina MD  Hematology Oncology Fellow, PGY-6  [facility] Pager: [phone]/ [facility] Pager: [phone]

## 2021-09-27 NOTE — PROGRESS NOTE ADULT - ASSESSMENT
66M with Hx HTN, GERD, and recently diagnosed pancreatic mass, s/p robot converted to open Whipple on 9/6/21 transferred from [facility] for elevated LFTs, nausea, and emesis, found to have extensive metastatic disease in post-surgical site, likely due to rapid recurrence, with c/f biliary obstruction.     PLAN:  - Pain control PRN   - C/w home meds: clonidine 0.1mg BID with hold parameters  - Diet: Clears  - Hyperbilirubinemia, increasing  - IVF: NS @ 125 due to hyponatremia  - Hypokalemia, hypomagnesemia, repleted  - f/u 9/27 blood cultures   - VTE ppx: Lovenox     D Team Surgery  [id number]

## 2021-09-27 NOTE — CONSULT NOTE ADULT - SUBJECTIVE AND OBJECTIVE BOX
Chief Complaint:  Patient is a 66y old  Male who presents with a chief complaint of Nausea and Emesis (27 Sep 2021 12:55)      HPI:    66 year old man with pancreatic head adenocarcinoma (diagnosed 07/2021 without vascular involvement) s/p robotic converted to open Whipple procedure on 9/6/21 without catarina-adjuvant chemotherapy, who was transferred from [facility] to [facility] for jaundice and worsening nausea and vomiting. Nausea and vomiting with subsequent food aversion started 3-4 days ago. Patient then noticed jaundice 2 days ago. He states his symptoms are similar but not quite the same as his initial presentation after pancreas mass was first identified. Repeat abdominal imaging demonstrated evidence of recurrent disease and severe intrahepatic biliary dilatation. On chart review, patient had biliary stent previously placed for mid-CBD stricture as well as pancreatic duct stent (biliary stent was removed intraoperatively during Whipple procedure). Patient is planned to start chemotherapy with FOLFORINOX by Oncology.     Allergies:  hydrALAZINE (Vomiting)  hydrALAZINE (Vomiting; Nausea)  No Known Allergies      Home Medications:    · 	cloNIDine 0.1 mg oral tablet: Last Dose Taken:  , 1 tab(s) orally 2 times a day     Hospital Medications:  cloNIDine 0.1 milliGRAM(s) Oral two times a day  enoxaparin Injectable 40 milliGRAM(s) SubCutaneous daily  potassium phosphate / sodium phosphate Powder (PHOS-NaK) 1 Packet(s) Oral every 8 hours  potassium phosphate IVPB 30 milliMole(s) IV Intermittent every 6 hours  sodium chloride 1 Gram(s) Oral every 8 hours  sodium chloride 0.9% 1000 milliLiter(s) IV Continuous <Continuous>      PMHX/PSHX:  No pertinent past medical history    Malignant tumor of pancreas    Hypertension    Chronic GERD    Lumbar herniated disc    Vertigo    DVT, lower extremity    History of hepatitis C    H/O ETOH abuse    Pancreatic tumor    Elevated LFTs    No significant past surgical history    History of biliary duct stent placement    History of Whipple procedure        Family history:  Family history of heart murmur (Mother)        Social History:     ROS:     General:  No weight loss, fevers, chills, night sweats, fatigue  Eyes:  No vision changes, no yellowing of eyes   ENT:  No throat pain, runny nose  CV:  No chest pain, palpitations  Resp:  No SOB, cough, wheezing  GI:  See HPI  :  No burning with urination, no hematuria   Muscle:  No muscle pain, weakness  Neuro:  No numbness/tingling, memory problems  Psych:  No fatigue, insomnia, mood problems  Heme:  No easy bruisability  Skin:  No rash, itching       PHYSICAL EXAM:     GENERAL:  Appears stated age, well-groomed, well-nourished, no distress  HEENT:  Conjunctivae clear and pink +scleral icterus  CHEST:  No increased respiratory effort  HEART:  Regular rhythm & rate   ABDOMEN:  Soft, non-tender, non-distended  EXTREMITIES:  no LE edema  SKIN:  No rash/erythema/ecchymoses/petechiae/jaundice +healed surgical incisions   NEURO:  Alert, orientedx3    Vital Signs:  Vital Signs Last 24 Hrs  T(C): 37.2 (27 Sep 2021 13:18), Max: 37.8 (26 Sep 2021 23:55)  T(F): 99 (27 Sep 2021 13:18), Max: 100 (26 Sep 2021 23:55)  HR: 95 (27 Sep 2021 13:18) (91 - 115)  BP: 107/59 (27 Sep 2021 13:18) (97/51 - 126/70)  BP(mean): --  RR: 18 (27 Sep 2021 13:18) (16 - 18)  SpO2: 97% (27 Sep 2021 13:18) (94% - 100%)  Daily Height in cm: 167.64 (26 Sep 2021 17:26)    Daily     LABS:                        10.5   8.26  )-----------( 117      ( 27 Sep 2021 07:17 )             29.6     09-27    131<L>  |  100  |  13  ----------------------------<  102<H>  3.3<L>   |  20<L>  |  0.52    Ca    8.9      27 Sep 2021 07:17  Phos  0.8     09-27  Mg     1.80     09-27    TPro  x   /  Alb  x   /  TBili  x   /  DBili  7.0<H>  /  AST  x   /  ALT  x   /  AlkPhos  x   09-27    LIVER FUNCTIONS - ( 27 Sep 2021 07:17 )  Alb: 2.7 g/dL / Pro: 5.5 g/dL / ALK PHOS: 264 U/L / ALT: 242 U/L / AST: 335 U/L / GGT: x           PT/INR - ( 27 Sep 2021 07:17 )   PT: 16.2 sec;   INR: 1.45 ratio    PTT - ( 26 Sep 2021 01:24 )  PTT:28.8 sec    Imaging:                   Chief Complaint:  Patient is a 66y old  Male who presents with a chief complaint of Nausea and Emesis (27 Sep 2021 12:55)      HPI:    66 year old man with pancreatic head adenocarcinoma (diagnosed 07/2021 without vascular involvement) s/p robotic converted to open Whipple procedure on 9/6/21 without catarina-adjuvant chemotherapy, who was transferred from [facility] to [facility] for jaundice and worsening nausea and vomiting. Nausea and vomiting with subsequent food aversion started 3-4 days ago. Patient then noticed jaundice 2 days ago. He states his symptoms are similar but not quite the same as his initial presentation after pancreas mass was first identified. Repeat abdominal imaging demonstrated evidence of recurrent disease and severe intrahepatic biliary dilatation. On chart review, patient had biliary stent previously placed for mid-CBD stricture as well as pancreatic duct stent (biliary stent was removed intraoperatively during Whipple procedure). Patient is planned to start chemotherapy with FOLFORINOX by Oncology.     Allergies:  hydrALAZINE (Vomiting)  hydrALAZINE (Vomiting; Nausea)  No Known Allergies      Home Medications:    · 	cloNIDine 0.1 mg oral tablet: Last Dose Taken:  , 1 tab(s) orally 2 times a day     Hospital Medications:  cloNIDine 0.1 milliGRAM(s) Oral two times a day  enoxaparin Injectable 40 milliGRAM(s) SubCutaneous daily  potassium phosphate / sodium phosphate Powder (PHOS-NaK) 1 Packet(s) Oral every 8 hours  potassium phosphate IVPB 30 milliMole(s) IV Intermittent every 6 hours  sodium chloride 1 Gram(s) Oral every 8 hours  sodium chloride 0.9% 1000 milliLiter(s) IV Continuous <Continuous>      PMHX/PSHX:  No pertinent past medical history    Malignant tumor of pancreas    Hypertension    Chronic GERD    Lumbar herniated disc    Vertigo    DVT, lower extremity    History of hepatitis C    H/O ETOH abuse    Pancreatic tumor    Elevated LFTs    No significant past surgical history    History of biliary duct stent placement    History of Whipple procedure        Family history:  Family history of heart murmur (Mother)        Social History: No sig Social Hx    ROS:     General:  No weight loss, fevers, chills, night sweats, fatigue  Eyes:  No vision changes, no yellowing of eyes   ENT:  No throat pain, runny nose  CV:  No chest pain, palpitations  Resp:  No SOB, cough, wheezing  GI:  See HPI  :  No burning with urination, no hematuria   Muscle:  No muscle pain, weakness  Neuro:  No numbness/tingling, memory problems  Psych:  No fatigue, insomnia, mood problems  Heme:  No easy bruisability  Skin:  No rash, itching       PHYSICAL EXAM:     GENERAL:  Appears stated age, well-groomed, well-nourished, no distress  HEENT:  Conjunctivae clear and pink +scleral icterus  CHEST:  No increased respiratory effort  HEART:  Regular rhythm & rate   ABDOMEN:  Soft, non-tender, non-distended  EXTREMITIES:  no LE edema  SKIN:  No rash/erythema/ecchymoses/petechiae/jaundice +healed surgical incisions   NEURO:  Alert, orientedx3    Vital Signs:  Vital Signs Last 24 Hrs  T(C): 37.2 (27 Sep 2021 13:18), Max: 37.8 (26 Sep 2021 23:55)  T(F): 99 (27 Sep 2021 13:18), Max: 100 (26 Sep 2021 23:55)  HR: 95 (27 Sep 2021 13:18) (91 - 115)  BP: 107/59 (27 Sep 2021 13:18) (97/51 - 126/70)  BP(mean): --  RR: 18 (27 Sep 2021 13:18) (16 - 18)  SpO2: 97% (27 Sep 2021 13:18) (94% - 100%)  Daily Height in cm: 167.64 (26 Sep 2021 17:26)    Daily     LABS:                        10.5   8.26  )-----------( 117      ( 27 Sep 2021 07:17 )             29.6     09-27    131<L>  |  100  |  13  ----------------------------<  102<H>  3.3<L>   |  20<L>  |  0.52    Ca    8.9      27 Sep 2021 07:17  Phos  0.8     09-27  Mg     1.80     09-27    TPro  x   /  Alb  x   /  TBili  x   /  DBili  7.0<H>  /  AST  x   /  ALT  x   /  AlkPhos  x   09-27    LIVER FUNCTIONS - ( 27 Sep 2021 07:17 )  Alb: 2.7 g/dL / Pro: 5.5 g/dL / ALK PHOS: 264 U/L / ALT: 242 U/L / AST: 335 U/L / GGT: x           PT/INR - ( 27 Sep 2021 07:17 )   PT: 16.2 sec;   INR: 1.45 ratio    PTT - ( 26 Sep 2021 01:24 )  PTT:28.8 sec    Imaging:

## 2021-09-28 NOTE — PROCEDURE NOTE - PLAN
- follow up results of culture  - biliary drainage catheter flush as per orders   - do not aspirate from drainage catheter.  - antibiotics per primary team. - follow up results of culture  - biliary drainage catheter flush as per orders   - do not aspirate from drainage catheter.  - antibiotics per primary team.    Drain should be exchanged every 3 months

## 2021-09-28 NOTE — PRE PROCEDURE NOTE - ATTENDING COMMENTS
Patient is a 66y old  Male who presents with a chief complaint of Nausea and Emesis found to have central biliary obstruction and fever today. Plan for biliary drainage catheter placement.

## 2021-09-28 NOTE — PROGRESS NOTE ADULT - ASSESSMENT
66M with Hx HTN, GERD, and recently diagnosed pancreatic mass, s/p robot converted to open Whipple on 9/6/21 transferred from [facility] for elevated LFTs, nausea, and emesis, found to have extensive metastatic disease in post-surgical site, likely due to rapid recurrence, with c/f biliary obstruction; now with cholangitis.    PLAN:  - Pain control PRN   - C/w home meds: clonidine 0.1mg BID with hold parameters  - Diet: NPO  - Hyperbilirubinemia/ LFTs worsening  - Appreciate GI and IR  recommendations; patient to go for IR biliary drain placement today  - IVF: NS @ 125 due to hyponatremia  - Severe hypophosphatemia, repletions with 30mgIVPB potassium phosphate q6h; repeat BMP in PM  - Febrile to 100.4, started on empiric Zosyn  - f/u 9/27 blood cultures   - VTE ppx: SCDs (chemoprophylaxis held for procedure)  - Given 10mg IV Vit K for elevated INR to 1.54    D Team Surgery  [id number]
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
## 2021-09-28 NOTE — PROGRESS NOTE ADULT - SUBJECTIVE AND OBJECTIVE BOX
Chief Complaint:  Patient is a 66y old  Male who presents with a chief complaint of Nausea and Emesis (27 Sep 2021 15:15)      Interval Events:     Abdominal pain stable - complaining of hiccups    Allergies:  hydrALAZINE (Vomiting)  hydrALAZINE (Vomiting; Nausea)  No Known Allergies      Hospital Medications:  cloNIDine 0.1 milliGRAM(s) Oral two times a day  piperacillin/tazobactam IVPB.. 3.375 Gram(s) IV Intermittent every 8 hours  potassium phosphate / sodium phosphate Powder (PHOS-NaK) 1 Packet(s) Oral every 8 hours  sodium chloride 1 Gram(s) Oral every 8 hours  sodium chloride 0.9% 1000 milliLiter(s) IV Continuous <Continuous>      PMHX/PSHX:  No pertinent past medical history    Malignant tumor of pancreas    Hypertension    Chronic GERD    Lumbar herniated disc    Vertigo    DVT, lower extremity    History of hepatitis C    H/O ETOH abuse    Pancreatic tumor    Elevated LFTs    No significant past surgical history    History of biliary duct stent placement    History of Whipple procedure        Family history:  Family history of heart murmur (Mother)        ROS:     General:  No weight loss, fevers, chills, night sweats, fatigue   Eyes:  No vision changes  ENT:  No sore throat, pain, runny nose  CV:  No chest pain, palpitations, dizziness   Resp:  No SOB, cough, wheezing  GI:  See HPI  :  No burning with urination, hematuria  Muscle:  No pain, weakness  Neuro:  No weakness/tingling, memory problems  Psych:  No fatigue, insomnia, mood problems, depression  Heme:  No easy bruisability  Skin:  No rash, edema      PHYSICAL EXAM:     GENERAL:  Appears stated age, well-groomed, well-nourished, no distress  HEENT:  Conjunctivae clear and pink +scleral icterus  CHEST:  No increased respiratory effort  HEART:  Regular rhythm & rate   ABDOMEN:  Soft, non-tender, non-distended  EXTREMITIES:  no LE edema  SKIN:  No rash/erythema/ecchymoses/petechiae/jaundice +healed surgical incisions   NEURO:  Alert, orientedx3      Vital Signs:  Vital Signs Last 24 Hrs  T(C): 37.7 (28 Sep 2021 08:46), Max: 38 (28 Sep 2021 05:00)  T(F): 99.9 (28 Sep 2021 08:46), Max: 100.4 (28 Sep 2021 05:00)  HR: 85 (28 Sep 2021 08:46) (85 - 110)  BP: 125/77 (28 Sep 2021 08:46) (107/59 - 144/74)  BP(mean): --  RR: 18 (28 Sep 2021 08:46) (18 - 18)  SpO2: 95% (28 Sep 2021 08:46) (93% - 97%)  Daily     Daily     LABS:                        10.7   6.57  )-----------( 85       ( 28 Sep 2021 07:02 )             30.7     09-28    134<L>  |  101  |  10  ----------------------------<  122<H>  3.8   |  15<L>  |  0.49<L>    Ca    8.8      28 Sep 2021 07:02  Phos  1.4     09-28  Mg     2.30     09-28    TPro  5.6<L>  /  Alb  2.4<L>  /  TBili  10.5<H>  /  DBili  x   /  AST  501<H>  /  ALT  239<H>  /  AlkPhos  287<H>  09-28    LIVER FUNCTIONS - ( 28 Sep 2021 07:02 )  Alb: 2.4 g/dL / Pro: 5.6 g/dL / ALK PHOS: 287 U/L / ALT: 239 U/L / AST: 501 U/L / GGT: x           PT/INR - ( 28 Sep 2021 07:02 )   PT: 17.3 sec;   INR: 1.54 ratio                 Imaging:

## 2021-09-28 NOTE — PROGRESS NOTE ADULT - SUBJECTIVE AND OBJECTIVE BOX
D TEAM Surgery Progress Note  Patient is a 66y old  Male who presents with a chief complaint of Nausea and Emesis (28 Sep 2021 11:14)      INTERVAL EVENTS: Patient is POD#52 s/p robot-assisted laparoscopic whipple, converted to open. Patient febrile to 100.4 this morning.    SUBJECTIVE: Patient seen and examined at bedside with surgical team, patient complaining of persistent nausea that has improved since yesterday. Patient also c/o hiccups. Denies abdominal pain. Passing flatus. Denies fever, chills, CP, SOB, vomiting.      OBJECTIVE:    Vital Signs Last 24 Hrs  T(C): 37.7 (28 Sep 2021 08:46), Max: 38 (28 Sep 2021 05:00)  T(F): 99.9 (28 Sep 2021 08:46), Max: 100.4 (28 Sep 2021 05:00)  HR: 85 (28 Sep 2021 08:46) (85 - 110)  BP: 125/77 (28 Sep 2021 08:46) (107/59 - 144/74)  BP(mean): --  RR: 18 (28 Sep 2021 08:46) (18 - 18)  SpO2: 95% (28 Sep 2021 08:46) (93% - 97%)I&O's Detail    27 Sep 2021 07:01  -  28 Sep 2021 07:00  --------------------------------------------------------  IN:    IV PiggyBack: 550 mL    Oral Fluid: 760 mL    sodium chloride 0.9% w/ Additives: 300 mL  Total IN: 1610 mL    OUT:    Voided (mL): 1400 mL  Total OUT: 1400 mL    Total NET: 210 mL      MEDICATIONS  (STANDING):  cloNIDine 0.1 milliGRAM(s) Oral two times a day  piperacillin/tazobactam IVPB.. 3.375 Gram(s) IV Intermittent every 8 hours  potassium phosphate / sodium phosphate Powder (PHOS-NaK) 1 Packet(s) Oral every 8 hours  sodium chloride 1 Gram(s) Oral every 8 hours  sodium chloride 0.9% 1000 milliLiter(s) (125 mL/Hr) IV Continuous <Continuous>    MEDICATIONS  (PRN):      PHYSICAL EXAM:  Constitutional: A&Ox3, NAD  Respiratory: Unlabored breathing  Abdomen: Soft, nondistended, NTTP. No rebound or guarding. Midline scar well healed.  Extremities: WWP, RICHTER spontaneously    LABS:                        10.7   6.57  )-----------( 85       ( 28 Sep 2021 07:02 )             30.7     09-28    134<L>  |  101  |  10  ----------------------------<  122<H>  3.8   |  15<L>  |  0.49<L>    Ca    8.8      28 Sep 2021 07:02  Phos  1.4     09-28  Mg     2.30     09-28    TPro  5.6<L>  /  Alb  2.4<L>  /  TBili  10.5<H>  /  DBili  x   /  AST  501<H>  /  ALT  239<H>  /  AlkPhos  287<H>  09-28    PT/INR - ( 28 Sep 2021 07:02 )   PT: 17.3 sec;   INR: 1.54 ratio           LIVER FUNCTIONS - ( 28 Sep 2021 07:02 )  Alb: 2.4 g/dL / Pro: 5.6 g/dL / ALK PHOS: 287 U/L / ALT: 239 U/L / AST: 501 U/L / GGT: x                 IMAGING:  < from: CT Chest w/ IV Cont (09.28.21 @ 11:56) >    EXAM:  CT CHEST IC        PROCEDURE DATE:  Sep 28 2021         INTERPRETATION:  Clinical information: Status post Whipple's procedure.    CT scan of the chest was obtained following administration of intravenous contrast. Approximately 40 cc of Omnipaque 350 was administered and 10 cc was discarded.    Few small lymph nodes are present in the right paratracheal space, pretracheal region, subcarinal space and AP window. Few small lymph nodes adjacent to the distal esophagus are unchanged when compared to prior CT scan of the abdomen of 9/25/2021. A 2 x 1 cm lymph node in the right cardiophrenic angle has increased in size when compared to prior CT scan of the abdomen dating back to 7/2/2021.    Heart is normal in size. No pericardial effusion is noted.    No endobronchial lesions are noted. A tracheal diverticulum is noted. No nodules are noted. Few linear opacities representing atelectasis/scarring are noted within both lungs. Very small pleural effusions are noted bilaterally.    For interpretation of the abdominal contents, please see the report of the CT scan of the abdomen performed on 9/25/2021.    Degenerative changes of the spine are noted.    IMPRESSION: No pulmonary nodules are noted.    2 cm lymph node in the right cardiophrenic angle has increased in size when compared to prior CT scan of the abdomen dating back to 7/2/2021. Etiology is unclear.    --- End of Report ---        < end of copied text >

## 2021-09-28 NOTE — CONSULT NOTE ADULT - SUBJECTIVE AND OBJECTIVE BOX
Vascular & Interventional Radiology Brief Consult Note    HPI: 66 year old male s/p whipple presents with abdominal pain found to have biliary dilation on imaging. Patient with fever overnight, concern for cholangitis. IR consulted for percutaneous biliary drain placement.     Allergies:   Medications (Abx/Cardiac/Anticoagulation/Blood Products)  cloNIDine: 0.1 milliGRAM(s) Oral (09-28 @ 08:40)  enoxaparin Injectable: 40 milliGRAM(s) SubCutaneous (09-27 @ 12:30)  piperacillin/tazobactam IVPB.: 200 mL/Hr IV Intermittent (09-27 @ 17:39)  piperacillin/tazobactam IVPB..: 25 mL/Hr IV Intermittent (09-28 @ 05:57)    Data:    T(C): 37.7  HR: 85  BP: 125/77  RR: 18  SpO2: 95%    -WBC 6.57 / HgB 10.7 / Hct 30.7 / Plt 85  -Na 134 / Cl 101 / BUN 10 / Glucose 122  -K 3.8 / CO2 15 / Cr 0.49  - / Alk Phos 287 / T.Bili 10.5  -INR1.54    Imaging: CT abd/pelvis on 9/25 with mild to moderate biliary ductal dilation due to central lesion, likely local recurrence of disease.

## 2021-09-28 NOTE — PROGRESS NOTE ADULT - ASSESSMENT
66 year old man with pancreatic head adenocarcinoma (diagnosed 07/2021 without vascular involvement) s/p robotic converted to open Whipple procedure on 9/6/21 without catarina-adjuvant chemotherapy, who was transferred from [facility] to [facility] for painless jaundice with evidence of recurrent malignancy and biliary obstruction    Impression:    # Intrahepatic & extrahepatic biliary ductal dilatation - Suspect biliary obstruction due to local recurrence of malignancy. Painless jaundice, afebrile, no leukocytosis - low suspicion for cholangitis at this time.   # Pancreatic adenocarcinoma s/p Whipple with likely recurrence - Implants involving upper abdomen, celiac axis and SMA; not yet started on chemotherapy  # History of mid-CBD stricture s/p stenting with stent removal during Whipple in 09/06/21    Recommendations:  - please consult IR for biliary ductal obstruction given post-Whipple anatomy and increased difficulty with endoscopic approach  - diet as tolerated/per Surgical Oncology      Lidia Fry PGY-6  Gastroenterology/Hepatology Fellow  Page #[phone]   Page #[phone] 5pm-7am on weekdays, and on weekends
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
## 2021-09-28 NOTE — PROGRESS NOTE ADULT - ATTENDING COMMENTS
Pt with a recurrent pancreas mass in post-whipple anatomy.  Although Pt with a recurrent pancreas mass in post-whipple anatomy with chris. Plan for IR PTBD.

## 2021-09-28 NOTE — CONSULT NOTE ADULT - ASSESSMENT
Assessment/Plan:   66y Male status post whipple presents biliary obstruction and fever concern for cholangitis. Discussed with GI, unable to perform ERCP due to post surgical anatomy. Plan for biliary drainage catheter placement today.  - Please place order for IR Procedure, approving attending Dr. Martínez   - maintain NPO status, confirmed NPO since 9/27.   - hold therapeutic and prophylactic anticoagulants  - maintain active type and screen x 2  - please draw stat Rapid COVID test (Abbot)  - case discussed with Dr. Martínez  - recommendations discussed with primary team.

## 2021-09-29 NOTE — CONSULT LETTER
[Dear  ___] : Dear  [unfilled], [Courtesy Letter:] : I had the pleasure of seeing your patient, [unfilled], in my office today. [Please see my note below.] : Please see my note below. [Consult Closing:] : Thank you very much for allowing me to participate in the care of this patient.  If you have any questions, please do not hesitate to contact me. [Sincerely,] : Sincerely, [FreeTextEntry3] : Tj Johnson MD, MPH, FACS, FSSO\par , [facility] General Surgical Oncology Fellowship\par [facility] [place]\par Associate Professor of Surgery\par [employer] [facility]  [DrCathy  ___] : Dr. MORTON
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
## 2021-09-29 NOTE — CHART NOTE - NSCHARTNOTEFT_GEN_A_CORE
Postoperative Check Note     Subjective: Patient is s/p Percutaneous bilary drainage catheter placement. Patient was seen and examined at bedside. Patient reports intermittent nausea, but denies chest pain, abdominal pain, shortness of breath at the time of examination.      Vital Signs:  Vital Signs Last 24 Hrs  T(C): 36.6 (29 Sep 2021 00:35), Max: 38 (28 Sep 2021 05:00)  T(F): 97.8 (29 Sep 2021 00:35), Max: 100.4 (28 Sep 2021 05:00)  HR: 109 (29 Sep 2021 00:35) (85 - 116)  BP: 120/73 (29 Sep 2021 00:35) (118/75 - 144/87)  BP(mean): --  RR: 20 (29 Sep 2021 00:35) (18 - 20)  SpO2: 95% (29 Sep 2021 00:35) (93% - 96%)    Physical Exam:  General: NAD  Lungs: Nonlabored breathing  Abdomen: Soft, appropriate tenderness at the catheter insertion site. Drain connected to bag with bilious output.      Assessment:  66y M Patient is with Hx HTN, GERD, and recently diagnosed pancreatic mass, s/p robot converted to open Whipple on 9/6/21, c/b rapid recurrence at the post-surgical site, biliary obstruction, cholangitis. Pt is now s/p Percutaneous bilary drainage catheter placement by IR on 09/29.    Plan:  - Pain control as needed  - NPO  - IVF  - Continue Abx  - Replete electrolytes  - VTE ppx with SCDs

## 2021-09-29 NOTE — CONSULT NOTE ADULT - TIME BILLING
Review of history, imaging, physical exam, procedural indications, discussion with the primary team
Review of history, imaging, physical exam, procedural indications, discussion with the primary team

## 2021-09-29 NOTE — CONSULT NOTE ADULT - SUBJECTIVE AND OBJECTIVE BOX
Vascular & Interventional Radiology Brief Consult Note    HPI: 66y Male with pancreatic cancer status post whipple presents with biliary obstruction now status post biliary drainage catheter placement. IR consulted today for biopsy of hepatic or other lesion to confirm metastatic disease.     Allergies:   Medications (Abx/Cardiac/Anticoagulation/Blood Products)  cloNIDine: 0.1 milliGRAM(s) Oral (09-29 @ 05:24)  piperacillin/tazobactam IVPB.: 200 mL/Hr IV Intermittent (09-27 @ 17:39)  piperacillin/tazobactam IVPB..: 25 mL/Hr IV Intermittent (09-29 @ 05:24)    Data:    T(C): 36.8  HR: 94  BP: 122/60  RR: 18  SpO2: 95%    -WBC 6.94 / HgB 9.1 / Hct 25.6 / Plt 57  -Na 139 / Cl 104 / BUN 15 / Glucose 121  -K 4.1 / CO2 20 / Cr 0.50  - / Alk Phos 180 / T.Bili 7.7  -INR1.20    Imaging: CT with scattered liver lesions. Chest wall lesion also identified.     Assessment/Plan:   66y Male with  Vascular & Interventional Radiology Brief Consult Note    HPI: 66y Male with pancreatic cancer status post whipple presents with biliary obstruction now status post biliary drainage catheter placement. IR consulted today for biopsy of hepatic or other lesion to confirm metastatic disease.     Allergies:   Medications (Abx/Cardiac/Anticoagulation/Blood Products)  cloNIDine: 0.1 milliGRAM(s) Oral (09-29 @ 05:24)  piperacillin/tazobactam IVPB.: 200 mL/Hr IV Intermittent (09-27 @ 17:39)  piperacillin/tazobactam IVPB..: 25 mL/Hr IV Intermittent (09-29 @ 05:24)    Data:    T(C): 36.8  HR: 94  BP: 122/60  RR: 18  SpO2: 95%    -WBC 6.94 / HgB 9.1 / Hct 25.6 / Plt 57  -Na 139 / Cl 104 / BUN 15 / Glucose 121  -K 4.1 / CO2 20 / Cr 0.50  - / Alk Phos 180 / T.Bili 7.7  -INR1.20    Imaging: CT with scattered liver lesions. Chest wall lesion also identified.     Assessment/Plan:   -- note incomplete -- Vascular & Interventional Radiology Brief Consult Note    HPI: 66y Male with pancreatic cancer status post whipple presents with biliary obstruction now status post biliary drainage catheter placement. IR consulted today for biopsy of hepatic or other lesion to confirm metastatic disease.     Allergies:   Medications (Abx/Cardiac/Anticoagulation/Blood Products)  cloNIDine: 0.1 milliGRAM(s) Oral (09-29 @ 05:24)  piperacillin/tazobactam IVPB.: 200 mL/Hr IV Intermittent (09-27 @ 17:39)  piperacillin/tazobactam IVPB..: 25 mL/Hr IV Intermittent (09-29 @ 05:24)    Data:    T(C): 36.8  HR: 94  BP: 122/60  RR: 18  SpO2: 95%    -WBC 6.94 / HgB 9.1 / Hct 25.6 / Plt 57  -Na 139 / Cl 104 / BUN 15 / Glucose 121  -K 4.1 / CO2 20 / Cr 0.50  - / Alk Phos 180 / T.Bili 7.7  -INR1.20    Imaging: CT with scattered liver lesions. Chest wall lesion also identified.     Assessment/Plan:   66 year old male with pancreatic cancer status post whipple presents with biliary obstruction status post internal/external biliary drainage catheter placement.   - would recommend holding off biopsy until bacteremia has cleared.   - can plan for biopsy on Tuesday   ------ Vascular & Interventional Radiology Brief Consult Note    HPI: 66y Male with pancreatic cancer status post whipple presents with biliary obstruction now status post biliary drainage catheter placement. IR consulted today for biopsy of hepatic or other lesion to confirm metastatic disease.     Allergies:   Medications (Abx/Cardiac/Anticoagulation/Blood Products)  cloNIDine: 0.1 milliGRAM(s) Oral (09-29 @ 05:24)  piperacillin/tazobactam IVPB.: 200 mL/Hr IV Intermittent (09-27 @ 17:39)  piperacillin/tazobactam IVPB..: 25 mL/Hr IV Intermittent (09-29 @ 05:24)    Data:    T(C): 36.8  HR: 94  BP: 122/60  RR: 18  SpO2: 95%    -WBC 6.94 / HgB 9.1 / Hct 25.6 / Plt 57  -Na 139 / Cl 104 / BUN 15 / Glucose 121  -K 4.1 / CO2 20 / Cr 0.50  - / Alk Phos 180 / T.Bili 7.7  -INR1.20    Imaging: CT with scattered liver lesions. Chest wall lesion also identified.     Assessment/Plan:   66 year old male with pancreatic cancer status post whipple presents with biliary obstruction status post internal/external biliary drainage catheter placement.   - would recommend holding off biopsy until bacteremia has cleared.   - can plan for biopsy on Monday vs Tuesday   - Please obtain COVID swab on Sunday as this would be valid for both dates  - will follow up to determine time of procedure.   - can place IR procedure order under approving attending, Dr. Martínez.   - please call IR with any questions or concerns

## 2021-09-29 NOTE — PROGRESS NOTE ADULT - SUBJECTIVE AND OBJECTIVE BOX
SURGERY DAILY PROGRESS NOTE:       SUBJECTIVE/ROS: No acute overnight events. Patient underwent placement of internal/external biliary drain with IR 9/28. No complaints of abdominal pain at this time; afebrile overnight.    OBJECTIVE:    Vital Signs Last 24 Hrs  T(C): 36.7 (29 Sep 2021 05:32), Max: 36.9 (28 Sep 2021 21:35)  T(F): 98.1 (29 Sep 2021 05:32), Max: 98.4 (28 Sep 2021 21:35)  HR: 95 (29 Sep 2021 05:32) (95 - 116)  BP: 128/79 (29 Sep 2021 05:32) (118/75 - 144/87)  BP(mean): --  RR: 18 (29 Sep 2021 05:32) (18 - 20)  SpO2: 95% (29 Sep 2021 05:32) (94% - 96%)    I&O's Detail    28 Sep 2021 07:01  -  29 Sep 2021 07:00  --------------------------------------------------------  IN:    Instillation (mL): 5 mL    IV PiggyBack: 200 mL    IV PiggyBack: 1000 mL    Oral Fluid: 100 mL    sodium chloride 0.9% w/ Additives: 1125 mL  Total IN: 2430 mL    OUT:    Drain (mL): 730 mL    Voided (mL): 650 mL  Total OUT: 1380 mL    Total NET: 1050 mL    PHYSICAL EXAM:  Constitutional: A&Ox3, NAD  Respiratory: Unlabored breathing  Abdomen: Soft, nondistended, NTTP. No rebound or guarding. Midline scar well healed. Biliary drain in place w/ bilious fluid in bag  Extremities: WWP, RICHTER spontaneously    LABS:                        9.1    6.94  )-----------( 57       ( 29 Sep 2021 06:36 )             25.6     09-29    139  |  104  |  15  ----------------------------<  121<H>  4.1   |  20<L>  |  0.50    Ca    8.4      29 Sep 2021 06:36  Phos  3.1     09-29  Mg     2.20     09-29    TPro  4.8<L>  /  Alb  2.1<L>  /  TBili  7.7<H>  /  DBili  x   /  AST  377<H>  /  ALT  184<H>  /  AlkPhos  180<H>  09-29    PT/INR - ( 29 Sep 2021 06:36 )   PT: 13.6 sec;   INR: 1.20 ratio         PTT - ( 29 Sep 2021 06:36 )  PTT:30.7 sec

## 2021-09-29 NOTE — PHYSICAL EXAM
[Normal] : well developed, well nourished, in no acute distress [de-identified] : soft, ND, NT; healed trocar sites and midline abdominal incision with no evidence of infection; Healed right abdominal DIANA site ; Belching

## 2021-09-29 NOTE — PROGRESS NOTE ADULT - ASSESSMENT
66 year old with pancreatic mass and biliary obstruction s/p internal/external biliary drain placement      Plan:  continue bedside drainage  monitor output

## 2021-09-29 NOTE — ASSESSMENT
[FreeTextEntry1] : IMP:\par 67 y/o male who presented to [facility] with symptoms of painless jaundice. Elevated LFT's and CA 19-9 levels. CT scan 7/2/21 revealed infiltrating pancreatic mass in the pancreatic head with obstruction and dilatation of the biliary tree and pancreatic duct, favored to represent pancreatic adenocarcinoma.  \par EGD/EUS 7/3/21: pathology pending.\par ERCP 7/3/21: bile duct stent placed \par \par ***SURGERY 8/6/2021-  s/p robotic assisted laparoscopic whipple, converted to open on 8/6/2021\par ***FINAL PATHOLOGY- 3.8 x 3.4 x 2.7 cm Invasive adenocarcinoma, moderately to poorly differentiated, see synoptic summary;  14/21 lymph nodes positive for metastatic carcinoma;  AJCC 8th edition stage pT2N2 \par \par 8/26/2021- Patients states he felt well for the 1st few days at home.  Around 8/14, pt. states he started to develop N/V if he ate too much.  He states he is only able to tolerate small, soft meals.  If he eats a big meal or meat, he feels nausea and vomits.  States he has had 3-4 episodes of vomiting since discharge.  States he has been eating applesauce, mashed potatoes, chicken broth, scrambled eggs.  He states he is unable to tolerate any meat.  Does not take any antinausea medications. Denies abdominal pain.  Denies fever or chills.  He has not been monitoring DIANA output but states he empties a nearly full DIANA bulb approx. 3-4x/day.  Reports fatigue/energy loss.  States "I feed my cats and then i go back to bed.  I don't sleep but i rest a lot which is not like me".  He also feels very dry. States he is only able to tolerate small amounts of water at a time. \par \par 9/23/2021-  Here with a friend. Consulted with heme-onc to discuss adjuvant chemotherapy.. Pt. is deciding between FOLFIRINOX and Gemcitabine and Capecitabine.    BW 9/15/2021 (- 110 U/mL). Scheduled for CT C/A/P on 9/24/2021.  Pt. is also pending Mediport placement for chemo.  Pt. was started on Zofran PRN nausea however states the nausea has not improved. He continues to eat very small amounts and reports a 10 lb weight loss.  Denies abdominal pain.  No vomiting but a lot of belching.  He was also started on Mirtazapine 15 mg PO QHS for insomnia/anxiety however states it knocked him out completely and he is no longer taking it. \par \par PLAN:\par 1) Chemo as per med-onc\par 2) Scheduled for CT C/A/P tomorrow, 9/24\par 3) Reglan and Carafate for biliary reflux  - Stop Zofran and Compazine \par 4) RTO in 1 week

## 2021-09-29 NOTE — PROGRESS NOTE ADULT - SUBJECTIVE AND OBJECTIVE BOX
s/p internal/external biliary drain placement  Patient denies abd. pain, N/V        Vital Signs Last 24 Hrs  T(C): 36.7 (29 Sep 2021 05:32), Max: 36.9 (28 Sep 2021 21:35)  T(F): 98.1 (29 Sep 2021 05:32), Max: 98.4 (28 Sep 2021 21:35)  HR: 95 (29 Sep 2021 05:32) (95 - 116)  BP: 128/79 (29 Sep 2021 05:32) (118/75 - 144/87)  BP(mean): --  RR: 18 (29 Sep 2021 05:32) (18 - 20)  SpO2: 95% (29 Sep 2021 05:32) (94% - 96%)    Focused Exam findings:    General: NAD  Abdomen: soft, NT ND  drain in place site c/d/i    LABS:                        9.1    6.94  )-----------( 57       ( 29 Sep 2021 06:36 )             25.6     09-29    139  |  104  |  15  ----------------------------<  121<H>  4.1   |  20<L>  |  0.50    Ca    8.4      29 Sep 2021 06:36  Phos  3.1     09-29  Mg     2.20     09-29    TPro  4.8<L>  /  Alb  2.1<L>  /  TBili  7.7<H>  /  DBili  x   /  AST  377<H>  /  ALT  184<H>  /  AlkPhos  180<H>  09-29    I&O's Detail    28 Sep 2021 07:01  -  29 Sep 2021 07:00  --------------------------------------------------------  IN:    Instillation (mL): 5 mL    IV PiggyBack: 200 mL    IV PiggyBack: 1000 mL    Oral Fluid: 100 mL    sodium chloride 0.9% w/ Additives: 1125 mL  Total IN: 2430 mL    OUT:    Drain (mL): 730 mL    Voided (mL): 650 mL  Total OUT: 1380 mL    Total NET: 1050 mL

## 2021-09-29 NOTE — PROGRESS NOTE ADULT - ASSESSMENT
66M with Hx HTN, GERD, and recently diagnosed pancreatic mass, s/p robot converted to open Whipple on 9/6/21 transferred from [facility] for elevated LFTs, nausea, and emesis, found to have extensive metastatic disease in post-surgical site, likely due to rapid recurrence, with c/f biliary obstruction; now s/p biliary drain placement 9/28 2/2 signs & symptoms cholangitis.    PLAN:  - Pain control PRN   - C/w home meds: clonidine 0.1mg BID with hold parameters  - Diet: regular  - Hyperbilirubinemia/ LFTs elevated but resolving  - Heme onc recommended IR consult to biopsy an area w/ metastatic lesion in order to prove stage 4 metastatic disease  - IVF: NS @ 125 due to hyponatremia  - Empiric Zosyn (started 9/28)  - f/u 9/27 blood cultures   - VTE ppx: SCDs (chemoprophylaxis held for procedure)  - Given 10mg IV Vit K for elevated INR to 1.54 with response -> 1.2    D Team Surgery  [id number]

## 2021-09-30 NOTE — PROGRESS NOTE ADULT - SUBJECTIVE AND OBJECTIVE BOX
SURGERY DAILY PROGRESS NOTE:     SUBJECTIVE/ROS: No acute overnight events. Patient underwent placement of internal/external biliary drain with IR 9/28. No complaints of abdominal pain at this time; afebrile overnight.    OBJECTIVE:    Vital Signs Last 24 Hrs  T(C): 36.6 (30 Sep 2021 11:19), Max: 36.9 (29 Sep 2021 21:33)  T(F): 97.8 (30 Sep 2021 11:19), Max: 98.4 (29 Sep 2021 21:33)  HR: 96 (30 Sep 2021 11:19) (71 - 96)  BP: 119/70 (30 Sep 2021 11:19) (113/56 - 135/83)  BP(mean): --  RR: 18 (30 Sep 2021 11:19) (17 - 18)  SpO2: 95% (30 Sep 2021 11:19) (94% - 97%)    I&O's Summary    29 Sep 2021 07:01  -  30 Sep 2021 07:00  --------------------------------------------------------  IN: 0 mL / OUT: 1600 mL / NET: -1600 mL      PHYSICAL EXAM:  Constitutional: A&Ox3, NAD  Respiratory: Unlabored breathing  Abdomen: Soft, nondistended, NTTP. No rebound or guarding. Midline scar well healed. Biliary drain in place w/ bilious fluid in bag  Extremities: WWP, RICHTER spontaneously      LABS:                                    9.3    6.64  )-----------( 98       ( 30 Sep 2021 07:21 )             27.2     09-30    136  |  105  |  13  ----------------------------<  105<H>  3.8   |  20<L>  |  0.45<L>    Ca    9.1      30 Sep 2021 07:21  Phos  1.8     09-30  Mg     2.20     09-30    TPro  5.0<L>  /  Alb  2.2<L>  /  TBili  6.2<H>  /  DBili  x   /  AST  164<H>  /  ALT  129<H>  /  AlkPhos  155<H>  09-30      PT/INR - ( 30 Sep 2021 07:21 )   PT: 11.9 sec;   INR: 1.04 ratio    PTT - ( 30 Sep 2021 07:21 )  PTT:29.0 sec

## 2021-09-30 NOTE — PROGRESS NOTE ADULT - ASSESSMENT
66M with Hx HTN, GERD, and recently diagnosed pancreatic mass, s/p robot converted to open Whipple on 9/6/21 transferred from [facility] for elevated LFTs, nausea, and emesis, found to have extensive metastatic disease in post-surgical site, likely due to rapid recurrence, with c/f biliary obstruction; now s/p biliary drain placement 9/28 2/2 signs & symptoms cholangitis.    PLAN:  **Follow-up with IR regarding liver biopsy for re-staging   - Pain control PRN   - C/w home meds: clonidine 0.1mg BID with hold parameters  - Diet: regular  - Hyperbilirubinemia/ LFTs elevated but resolving  - F/U Heme/Onc   - IVF: NS @ 125 due to hyponatremia  - Empiric Zosyn (started 9/28)  - f/u 9/27 blood cultures   - VTE ppx: SCDs (chemoprophylaxis held for procedure)        D Team Surgery  [id number]

## 2021-10-01 NOTE — DISCHARGE NOTE PROVIDER - CARE PROVIDER_API CALL
Tj Johnson)  Complex General Surgical Oncology; Surgery; Surgical Oncology  [address] [place] [address]  Phone: [phone]  Fax: [phone]  Follow Up Time: 2 weeks   Tj Johnson)  Complex General Surgical Oncology; Surgery; Surgical Oncology  [address] [place] [address]  Phone: [phone]  Fax: [phone]  Follow Up Time: 1 week

## 2021-10-01 NOTE — DISCHARGE NOTE PROVIDER - CARE PROVIDERS DIRECT ADDRESSES
[email or web address]

## 2021-10-01 NOTE — DISCHARGE NOTE PROVIDER - HOSPITAL COURSE
66M w/PMHx of HTN, GERD, and recently diagnosed pancreatic mass, s/p robot converted to open Whipple on 9/6 who was transferred from [facility] to [facility] due to worsening nausea and vomiting for 5 days. CT AP at [facility] demonstrated extensive metastatic disease in the jude hepatis, celiac axis, and peritoneal cavity with likely liver metastases and severe intrahepatic biliary dilatation.  Patient was admitted to surgical oncology service.   9/27:  - hematology/oncology was consulted. They recommended to follow up gastroenterology plans, repeat CT with IV contrast to complete restaging scans, and to consult interventional radiology to do a guided biopsy of the most accessible metastatic site to prove metastatic disease and establish stage IV cancer. No plans for any inpatient systemic therapy, and patient should follow up with primary oncologist Dr. Boyd at [facility].  - gastroenterology was consulted for ERCP for biliary stenting - discussion was to be had with advanced GI attending given post-surgical anatomy.  9/28  - gastroenterology recommendations were to consult IR for biliary ductal obstruction given post-Whipple anatomy and increased difficulty with endoscopic approach.   - interventional radiology was consulted for percutaneous biliary drain placement.  8.5 internal/external biliary drain was placed and sent for cultures and gram stain. Patient tolerated the procedure well.   9/29:  - regular diet was restarted.  - interventional radiology was consulted for biopsy of hepatic or other lesion to confirm metastatic disease. Plan was to perform biopsy the following week    ** complete up to 10/1 66M w/PMHx of HTN, GERD, and recently diagnosed pancreatic mass, s/p robot converted to open Whipple on 9/6 who was transferred from [facility] to [facility] due to worsening nausea and vomiting for 5 days. CT AP at [facility] demonstrated extensive metastatic disease in the jude hepatis, celiac axis, and peritoneal cavity with likely liver metastases and severe intrahepatic biliary dilatation.  Patient was admitted to surgical oncology service.   9/27:  - hematology/oncology was consulted. They recommended to follow up gastroenterology plans, repeat CT with IV contrast to complete restaging scans, and to consult interventional radiology to do a guided biopsy of the most accessible metastatic site to prove metastatic disease and establish stage IV cancer. No plans for any inpatient systemic therapy, and patient should follow up with primary oncologist Dr. Body at [facility].  - gastroenterology was consulted for ERCP for biliary stenting - discussion was to be had with advanced GI attending given post-surgical anatomy.  9/28  - gastroenterology recommendations were to consult IR for biliary ductal obstruction given post-Whipple anatomy and increased difficulty with endoscopic approach.   - interventional radiology was consulted for percutaneous biliary drain placement.  8.5 internal/external biliary drain was placed and sent for cultures and gram stain. Patient tolerated the procedure well.   - patient was started on empiric IV Zosyn in the setting of fever. (100.4)   9/29:  - regular diet was restarted.  - interventional radiology was consulted for biopsy of hepatic or other lesion to confirm metastatic disease. Planned to perform biopsy when final blood cultures results are confirmed to be negative.    10/1:  Patient is stable: tolerating diet, voiding, ambulating, pain well controlled. Per attending, patient is stable for discharge home with outpatient oncology follow up and outpatient interventional radiology follow up for biopsy.
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
## 2021-10-01 NOTE — DISCHARGE NOTE PROVIDER - PROVIDER TOKENS
PROVIDER:[TOKEN:[[id number]],FOLLOWUP:[2 weeks]] PROVIDER:[TOKEN:[[id number]],FOLLOWUP:[1 week]]

## 2021-10-01 NOTE — PROGRESS NOTE ADULT - ASSESSMENT
66M with Hx HTN, GERD, and recently diagnosed pancreatic mass, s/p robot converted to open Whipple on 9/6/21 transferred from [facility] for elevated LFTs, nausea, and emesis, found to have extensive metastatic disease in post-surgical site, likely due to rapid recurrence, with c/f biliary obstruction; now s/p biliary drain placement 9/28 2/2 signs & symptoms cholangitis.    PLAN:  **Follow-up with IR regarding liver biopsy for re-staging   - Pain control PRN   - C/w home meds: clonidine 0.1mg BID with hold parameters  - Diet: regular  - Hyperbilirubinemia/ LFTs elevated but resolving  - F/U Heme/Onc   - IVF: NS @ 125 due to hyponatremia  - Empiric Zosyn (started 9/28)  - f/u 9/27 blood cultures   - VTE ppx: SCDs (chemoprophylaxis held for procedure)        D Team Surgery  [id number]

## 2021-10-01 NOTE — ASSESSMENT
[FreeTextEntry1] : IMP:\par 65 y/o male who presented to [facility] with symptoms of painless jaundice. Elevated LFT's and CA 19-9 levels. CT scan 7/2/21 revealed infiltrating pancreatic mass in the pancreatic head with obstruction and dilatation of the biliary tree and pancreatic duct, favored to represent pancreatic adenocarcinoma.  \par EGD/EUS 7/3/21: pathology pending.\par ERCP 7/3/21: bile duct stent placed \par \par PLAN:\par 1) We discussed the risks, benefits, and alternatives of the procedure with the patient, he expressed understanding and agree to proceed with robotic possible open whipple procedure. \par 2) Medical clearance

## 2021-10-01 NOTE — DISCHARGE NOTE PROVIDER - NSDCCPTREATMENT_GEN_ALL_CORE_FT
PRINCIPAL PROCEDURE  Procedure: Percutaneous internal and external biliary drainage  Findings and Treatment:

## 2021-10-01 NOTE — DISCHARGE NOTE PROVIDER - NSDCCPCAREPLAN_GEN_ALL_CORE_FT
PRINCIPAL DISCHARGE DIAGNOSIS  Diagnosis: Biliary tract obstruction  Assessment and Plan of Treatment:

## 2021-10-01 NOTE — DISCHARGE NOTE PROVIDER - NSDCFUADDAPPT_GEN_ALL_CORE_FT
1. Please follow up with interventional radiology to get your biopsy.  Please call today [phone] to schedule an appointment.    2. Please follow up with your primary oncologist Dr. Boyd at [facility].

## 2021-10-01 NOTE — DISCHARGE NOTE PROVIDER - NSDCFUADDINST_GEN_ALL_CORE_FT
Continue to empty and record the drain output daily as you have been taught.   NOTIFY YOUR SURGEON IF: You have any bleeding that does not stop, any pus draining from your wound(s), any fever (over 100.4 F) or chills, persistent nausea/vomiting, persistent diarrhea, or if your pain is not controlled on your discharge pain medications.  FOLLOW-UP: Please follow up with your primary care physician in one week regarding your hospitalization.
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
## 2021-10-02 NOTE — DIETITIAN INITIAL EVALUATION ADULT. - PERTINENT LABORATORY DATA
10-02 Na130 mmol/L<L> Glu 97 mg/dL K+ 4.0 mmol/L Cr  0.46 mg/dL<L> BUN 10 mg/dL 10-02 Phos 1.8 mg/dL<L> 10-02 Alb 2.1 g/dL<L>

## 2021-10-02 NOTE — DIETITIAN INITIAL EVALUATION ADULT. - ADD RECOMMEND
1. If no improvement in po intake, strongly consider alternate means of nutrition given malnutrition diagnosis and prolonged poor po intake. 2. Monitor weights, labs, BM's, skin integrity, p.o. intake. 3. Please document % PO intake in nursing flowsheet.

## 2021-10-02 NOTE — PROGRESS NOTE ADULT - ASSESSMENT
Assessment:          Plan: 66 M with Hx HTN, GERD, and recently diagnosed pancreatic mass, s/p robot converted to open Whipple on 9/6/21 transferred from [facility] for elevated LFTs, nausea, and emesis, found to have extensive metastatic disease in post-surgical site, likely due to rapid recurrence, with c/f biliary obstruction; now s/p biliary drain placement 9/28 2/2 signs & symptoms cholangitis.    Plan:  - Tentatively planned to go to IR on Monday for liver biopsy if repeat blood cultures negative  - F/u repeat blood cultures from 10/1 PM  - Continue abx: ID recommends switching from Zosyn to Unasyn  - Will add compazine to antiemetic regimen in setting of hiccups; patient already receiving baclofen  - Salt tabs and NS @ 50cc/hr for hyponatremia correction  - Pain control  - Continue home meds as ordere  - Regular diet  - Appreciate heme/onc recs  - DVT ppx: LVX, will hold if IR to do provedure  - Dispo planning      D Team Surgery  [id number]

## 2021-10-02 NOTE — DIETITIAN INITIAL EVALUATION ADULT. - CONTINUE CURRENT NUTRITION CARE PLAN
Continue Regular diet interim. Continue PO supplement Ensure Enlive 240mls 3x daily (1050kcal, 60g protein) -Chocolate and Ensure Pudding 2x daily (340 kcals, 8g protein).

## 2021-10-02 NOTE — PROGRESS NOTE ADULT - SUBJECTIVE AND OBJECTIVE BOX
SURGERY PROGRESS NOTE    Subjective:     Vital Signs:  Vital Signs Last 24 Hrs  T(C): 36.9 (01 Oct 2021 21:28), Max: 37 (01 Oct 2021 16:25)  T(F): 98.4 (01 Oct 2021 21:28), Max: 98.6 (01 Oct 2021 16:25)  HR: 78 (01 Oct 2021 21:28) (75 - 84)  BP: 128/66 (01 Oct 2021 21:28) (122/62 - 153/72)  BP(mean): --  RR: 18 (01 Oct 2021 21:28) (18 - 18)  SpO2: 93% (01 Oct 2021 21:28) (93% - 96%)    Physical Exam:  General:   Lungs:   CV:   Abdomen:  Extremities:          Surgical Oncology Progress Note    Subjective:   Patient seen and examined on AM rounds. Patient with significant hiccuping this AM. Unable to complete a sentence without hiccups. Also complaining of nausea/vomiting. Hyponatremic.    Vital Signs:  Vital Signs Last 24 Hrs  T(C): 36.9 (01 Oct 2021 21:28), Max: 37 (01 Oct 2021 16:25)  T(F): 98.4 (01 Oct 2021 21:28), Max: 98.6 (01 Oct 2021 16:25)  HR: 78 (01 Oct 2021 21:28) (75 - 84)  BP: 128/66 (01 Oct 2021 21:28) (122/62 - 153/72)  BP(mean): --  RR: 18 (01 Oct 2021 21:28) (18 - 18)  SpO2: 93% (01 Oct 2021 21:28) (93% - 96%)    Physical Exam:  General: Hiccuping this AM, in some distress  CV: Regular rate  Resp: Nonlabored breathing on room air   Abdomen: Soft, nondistended, nontender. Well healed midline scare. Billary drain in place with bilious output in pouch.  Extremities: Moves all four extremities spontaneously, warm and well perfused

## 2021-10-02 NOTE — CONSULT NOTE ADULT - ASSESSMENT
67 y/o M PMHx etOH abuse, HCV (s/p treatment), and recently diagnosed pancreatic CA (s/p open Whipple on 9/6/21), initially presented with N/V, found to have biliary obstruction 2/2 malignancy. Developed cholangitis and e.coli bacteremia, now s/p perc biliary drain placement on 9/28.     INCOMPLETE NOTE 65 y/o M PMHx etOH abuse, HCV (s/p treatment), and recently diagnosed pancreatic CA (s/p open Whipple on 9/6/21), initially presented with N/V, found to have biliary obstruction 2/2 malignancy. Developed cholangitis and e.coli bacteremia, now s/p perc biliary drain placement on 9/28.     #E. Coli Bacteremia 2/2 Cholangitis, Biliary Obstruction - pancreatic CA (s/p Whipple last month) now with POD and acute biliary obstruction c/b cholangitis and pan-sensitive e. coli bacteremia, s/p perc biliary drain. Bacteremia has persisted despite appropriate abx therapy, raising concern for interval development of intra-abdominal abscess. No worsening back pain to suggest vertebral focus of infection.    Recs:  - d/c Zosyn  - start Unasyn 3g q6h  - recommend CT A/P w/ IV contrast to evaluate for abscess  - repeat blood cultures for clearance         José Manuel Loco MD PGY-4  Infectious Disease Fellow  Pager: [phone]  Before 9AM or after 5PM: [phone]

## 2021-10-02 NOTE — DIETITIAN INITIAL EVALUATION ADULT. - OTHER INFO
Met with patient at bedside. Reports tolerating diet better, still remains with poor po intake. Assisted patient with menu selection. Menu selection encouraged to optimize po intake. Reports he is a picky eater and will only eat certain items from the selective choices. Prefers only Chocolate Ensure-Diet office was informed. Patient denies any nausea/vomiting/diarrhea/constipation or difficulty chewing and swallowing. Having hiccups- unable to report time frame. Reports weight loss but unable o quantify. Downtrending weight noted per HIE, weight of 89kg (7/2/2021), 90.3kg (7/23/2021), 87.1kg (8/6/2021), 77.3kg (9/15/2021) and now this admission 73.3kg (9/25/21). Discussed strategies to increase kcal and protein intake. Small frequent po intake of nutrient and protein dense foods encouraged. RD remains available, patient made aware.

## 2021-10-02 NOTE — DIETITIAN INITIAL EVALUATION ADULT. - OTHER CALCULATIONS
Clinic Care Coordination Contact    CC met the patient face to face before PCP visit today.  CC contact information given for future questions or concerns .  CC will follow up in 1-2 weeks for an update on blood sugar readings     Isha Aviles RN / Clinical Care Coordinator     [address] [address]  [email or web address]  Office :  [phone] / Fax :  [phone]  
Estimated needs based on current weight.

## 2021-10-02 NOTE — DIETITIAN NUTRITION RISK NOTIFICATION - TREATMENT: THE FOLLOWING DIET HAS BEEN RECOMMENDED
Diet, Regular:   Free Water Flush Instructions:  chocolate ensures if available  Supplement Feeding Modality:  Oral  Ensure Enlive Cans or Servings Per Day:  1       Frequency:  Three Times a day  Ensure Pudding Cans or Servings Per Day:  1       Frequency:  Two Times a day (10-01-21 @ 18:04) [Active]

## 2021-10-03 NOTE — PROGRESS NOTE ADULT - SUBJECTIVE AND OBJECTIVE BOX
Surgery Progress Note    SUBJECTIVE:     Vital Signs Last 24 Hrs  T(C): 36.7 (03 Oct 2021 00:19), Max: 36.8 (02 Oct 2021 17:00)  T(F): 98.1 (03 Oct 2021 00:19), Max: 98.2 (02 Oct 2021 17:00)  HR: 80 (03 Oct 2021 00:19) (80 - 107)  BP: 136/67 (03 Oct 2021 00:19) (104/67 - 137/78)  BP(mean): --  RR: 18 (03 Oct 2021 00:19) (17 - 18)  SpO2: 95% (03 Oct 2021 00:19) (92% - 98%)    Physical Exam:  General:  Neuro:    CV:   Abdomen:     LABS:                        9.2    8.47  )-----------( 206      ( 02 Oct 2021 06:49 )             26.3     10-02    130<L>  |  104  |  10  ----------------------------<  97  4.0   |  19<L>  |  0.46<L>    Ca    9.9      02 Oct 2021 06:49  Phos  1.8     10-02  Mg     1.90     10-02    TPro  5.1<L>  /  Alb  2.1<L>  /  TBili  6.7<H>  /  DBili  x   /  AST  49<H>  /  ALT  63<H>  /  AlkPhos  118  10-02    PT/INR - ( 02 Oct 2021 06:49 )   PT: 13.0 sec;   INR: 1.14 ratio         PTT - ( 02 Oct 2021 06:49 )  PTT:25.2 sec      INs and OUTs:    10-01-21 @ 07:01  -  10-02-21 @ 07:00  --------------------------------------------------------  IN: 600 mL / OUT: 1775 mL / NET: -1175 mL    10-02-21 @ 07:01  -  10-03-21 @ 01:28  --------------------------------------------------------  IN: 2120 mL / OUT: 2725 mL / NET: -605 mL     Surgical Oncology Progress Note    SUBJECTIVE:   Patient seen and examined at bedside on AM rounds. Resting comfortably in bed. Hiccuping improved.    Vital Signs Last 24 Hrs  T(C): 36.7 (03 Oct 2021 00:19), Max: 36.8 (02 Oct 2021 17:00)  T(F): 98.1 (03 Oct 2021 00:19), Max: 98.2 (02 Oct 2021 17:00)  HR: 80 (03 Oct 2021 00:19) (80 - 107)  BP: 136/67 (03 Oct 2021 00:19) (104/67 - 137/78)  BP(mean): --  RR: 18 (03 Oct 2021 00:19) (17 - 18)  SpO2: 95% (03 Oct 2021 00:19) (92% - 98%)    Physical Exam:  General: NAD, resting comfortably in bed  CV: Regular rate  Resp: Nonlabored breathing on room air   Abdomen: Soft, nondistended, nontender. Well healed midline scare. Billary drain in place with bilious output in pouch.  Extremities: Moves all four extremities spontaneously, warm and well perfused     LABS:                        9.2    8.47  )-----------( 206      ( 02 Oct 2021 06:49 )             26.3     10-02    130<L>  |  104  |  10  ----------------------------<  97  4.0   |  19<L>  |  0.46<L>    Ca    9.9      02 Oct 2021 06:49  Phos  1.8     10-02  Mg     1.90     10-02    TPro  5.1<L>  /  Alb  2.1<L>  /  TBili  6.7<H>  /  DBili  x   /  AST  49<H>  /  ALT  63<H>  /  AlkPhos  118  10-02    PT/INR - ( 02 Oct 2021 06:49 )   PT: 13.0 sec;   INR: 1.14 ratio         PTT - ( 02 Oct 2021 06:49 )  PTT:25.2 sec      INs and OUTs:    10-01-21 @ 07:01  -  10-02-21 @ 07:00  --------------------------------------------------------  IN: 600 mL / OUT: 1775 mL / NET: -1175 mL    10-02-21 @ 07:01  -  10-03-21 @ 01:28  --------------------------------------------------------  IN: 2120 mL / OUT: 2725 mL / NET: -605 mL
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
## 2021-10-03 NOTE — PROGRESS NOTE ADULT - ASSESSMENT
66 M with Hx HTN, GERD, and recently diagnosed pancreatic mass, s/p robot converted to open Whipple on 9/6/21 transferred from [facility] for elevated LFTs, nausea, and emesis, found to have extensive metastatic disease in post-surgical site, likely due to rapid recurrence, with c/f biliary obstruction; now s/p biliary drain placement 9/28 2/2 signs & symptoms cholangitis.    Plan:  - IR for liver biopsy tomorrow  - Continue abx: Unasyn  - Continue compazine and baclofen  - Salt tabs TID and NS @ 50cc/hr for hyponatremia correction  - Pain control  - Continue home meds as ordere  - Regular diet, NPO after midnight  - Appreciate heme/onc recs  - DVT ppx: LVX, will hold for IR procedure  - Dispo planning      D Team Surgery  [id number]

## 2021-10-04 NOTE — PROCEDURE NOTE - PROCEDURE FINDINGS AND DETAILS
Dilated biliary system. Accessed with gauge needle. Contrast administered shows near complete obstruction of the common hepatic duct. Left biliary duct communicates with right biliary duct. 8.5 internal/external biliary drain placed. Connected to drainage bag. 8PM dose of zosyn given during procedure.
Sonogram liver demonstrated heterogenous hypoechoic mass in left hepatic lobe. Access needed was advanced into lesion under ultrasound guidance. Biopsy device advanced through needle and a total of 3 core specimens were obtained and deemed adequate by onsite pathologist. Hemostatic agent applied through tract and sterile dressing applied.

## 2021-10-04 NOTE — PROGRESS NOTE ADULT - SUBJECTIVE AND OBJECTIVE BOX
VASCULAR SURGERY PROGRESS NOTE    Subjective:     Vital Signs:  Vital Signs Last 24 Hrs  T(C): 36.6 (03 Oct 2021 21:59), Max: 36.6 (03 Oct 2021 21:59)  T(F): 97.9 (03 Oct 2021 21:59), Max: 97.9 (03 Oct 2021 21:59)  HR: 87 (03 Oct 2021 21:59) (81 - 95)  BP: 130/78 (03 Oct 2021 21:59) (130/78 - 138/84)  BP(mean): --  RR: 18 (03 Oct 2021 21:59) (15 - 18)  SpO2: 94% (03 Oct 2021 21:59) (94% - 96%)    Physical Exam:  General:   Lungs:   CV:   Abdomen:  Extremities:          SURGERY PROGRESS NOTE    Subjective:     Vital Signs:  Vital Signs Last 24 Hrs  T(C): 36.6 (03 Oct 2021 21:59), Max: 36.6 (03 Oct 2021 21:59)  T(F): 97.9 (03 Oct 2021 21:59), Max: 97.9 (03 Oct 2021 21:59)  HR: 87 (03 Oct 2021 21:59) (81 - 95)  BP: 130/78 (03 Oct 2021 21:59) (130/78 - 138/84)  BP(mean): --  RR: 18 (03 Oct 2021 21:59) (15 - 18)  SpO2: 94% (03 Oct 2021 21:59) (94% - 96%)    Physical Exam:  General:   Lungs:   CV:   Abdomen:  Extremities:          Surgical Oncology Progress Note    Subjective:   Patient seen and examined at bedside on AM rounds. Resting comfortably in bed. No hiccups.    Vital Signs:  Vital Signs Last 24 Hrs  T(C): 36.6 (03 Oct 2021 21:59), Max: 36.6 (03 Oct 2021 21:59)  T(F): 97.9 (03 Oct 2021 21:59), Max: 97.9 (03 Oct 2021 21:59)  HR: 87 (03 Oct 2021 21:59) (81 - 95)  BP: 130/78 (03 Oct 2021 21:59) (130/78 - 138/84)  BP(mean): --  RR: 18 (03 Oct 2021 21:59) (15 - 18)  SpO2: 94% (03 Oct 2021 21:59) (94% - 96%)    Physical Exam:  General: NAD, resting comfortably in bed  CV: Regular rate  Resp: Nonlabored breathing on room air   Abdomen: Soft, nondistended, nontender. Well healed midline scare. Billary drain in place with bilious output in pouch.  Extremities: Moves all four extremities spontaneously, warm and well perfused

## 2021-10-04 NOTE — PROGRESS NOTE ADULT - ASSESSMENT
Assessment:          Plan: 66 M with Hx HTN, GERD, and recently diagnosed pancreatic mass, s/p robot converted to open Whipple on 9/6/21 transferred from [facility] for elevated LFTs, nausea, and emesis, found to have extensive metastatic disease in post-surgical site, likely due to rapid recurrence, with c/f biliary obstruction; now s/p biliary drain placement 9/28 2/2 signs & symptoms cholangitis.    Plan:  - IR for liver biopsy today  - Continue abx: Unasyn  - Continue compazine and baclofen  - Salt tabs TID and NS @ 50cc/hr for hyponatremia correction; sodium is improving  - Pain control  - Continue home meds as ordered  - NPO, resume regular diet after IR procedure  - Appreciate heme/onc recs  - DVT ppx: LVX, will hold for IR procedure, resume afterwards  - Dispo planning      D Team Surgery  [id number]

## 2021-10-04 NOTE — PROCEDURE NOTE - PLAN
Chief complaint:   Chief Complaint   Patient presents with   • Laceration     Cut his leg with a  knife on a box he had in his truck.        Vitals:  Visit Vitals  /70   Pulse 108   Temp 99.5 °F (37.5 °C) (Tympanic)   Resp 16   Ht 5' 6\" (1.676 m)       HISTORY OF PRESENT ILLNESS     Not work related injury.    Cleaned wound with water, put tourniquet on leg for 10 minutes due to initial bleeding.      Laceration    The incident occurred less than 1 hour ago. Pain location: right thigh. Size: 1.5cm. The laceration mechanism was a clean knife (). The pain is mild. The pain has been constant since onset. He reports no foreign bodies present. His tetanus status is UTD (2010).       Other significant problems:  There are no active problems to display for this patient.      PAST MEDICAL, FAMILY AND SOCIAL HISTORY     Medications:  No current outpatient prescriptions on file.     No current facility-administered medications for this visit.        Allergies:  ALLERGIES:  No Known Allergies    Past Medical  History/Surgeries:  No past medical history on file.    Past Surgical History:   Procedure Laterality Date   • HERNIA REPAIR         Family History:  No family history on file.    Social History:  Social History   Substance Use Topics   • Smoking status: Current Every Day Smoker   • Smokeless tobacco: Not on file   • Alcohol use Yes       REVIEW OF SYSTEMS     Review of Systems    PHYSICAL EXAM     Physical Exam   Constitutional: He is oriented to person, place, and time. He appears well-developed and well-nourished. No distress.   Musculoskeletal: Normal range of motion.        Legs:  Neurological: He is alert and oriented to person, place, and time.   Skin: Skin is warm and dry. He is not diaphoretic. No erythema.   Nursing note and vitals reviewed.    The wound was cleaned with saline, anesthetized locally with 1% lidocaine WITH EPINEPHRINE times 1.5 cc.  After good wound anesthesia, the wound  was closed using 5-0 Ethilon suture material times 3 interrupted stitches.  Good approximation and hemostasis were achieved.  The wound was dressed and the patient given wound care instructions and suture removal is recommended in 9-10 days.    ASSESSMENT/PLAN     Jerman was seen today for laceration.    Diagnoses and all orders for this visit:    Laceration of leg, right, initial encounter  -     REPR SUPERF WND BODY <2.5CM        See the After Visit Summary for additional instructions, follow-up plans and/or emergency room precautions discussed with the patient.    Patient (or parent/guardian if applicable) was in agreement with treatment and discharge plan, appropriately stable at time of discharge from urgent care clinic.     -core biopsies sent for analysis  -bed rest 3 hours  -diet and IV per primary service

## 2021-10-04 NOTE — CHART NOTE - NSCHARTNOTEFT_GEN_A_CORE
Post Procedure Check Note  Patient: MARIA EUGENIA CLARKE 66y (1955) Male   MRN: [id number]  Location: [place] [address] [address]  Visit: 09-25-21 Inpatient  Date: 10-04-21 @ 15:13    Procedure: S/P US guided liver mass biopsy by IR    Subjective: Recovering well from procedure. Resting comfortably in bed. No nausea/vomiting. Pain controlled.      Objective:  Vitals: T(F): 97.6 (10-04-21 @ 13:31), Max: 98.1 (10-04-21 @ 09:00)  HR: 93 (10-04-21 @ 13:31)  BP: 133/62 (10-04-21 @ 13:31) (126/79 - 138/84)  RR: 18 (10-04-21 @ 13:31)  SpO2: 97% (10-04-21 @ 13:31)  Vent Settings:     In:   10-03-21 @ 07:01  -  10-04-21 @ 07:00  --------------------------------------------------------  IN: 2550 mL    10-04-21 @ 07:01  -  10-04-21 @ 15:13  --------------------------------------------------------  IN: 0 mL      IV Fluids: potassium phosphate / sodium phosphate Tablet (K-PHOS No. 2) 2 Tablet(s) Oral four times a day with meals  sodium chloride 1 Gram(s) Oral every 8 hours      Out:   10-03-21 @ 07:01  -  10-04-21 @ 07:00  --------------------------------------------------------  OUT: 3970 mL    10-04-21 @ 07:01  -  10-04-21 @ 15:13  --------------------------------------------------------  OUT: 900 mL      EBL:     Voided Urine:   10-03-21 @ 07:01  -  10-04-21 @ 07:00  --------------------------------------------------------  OUT: 3970 mL    10-04-21 @ 07:01  -  10-04-21 @ 15:13  --------------------------------------------------------  OUT: 900 mL      [place] Catheter: yes no   Drains:   DIANA:   10-03-21 @ 07:01  -  10-04-21 @ 07:00  --------------------------------------------------------  OUT: 1420 mL    10-04-21 @ 07:01  -  10-04-21 @ 15:13  --------------------------------------------------------  OUT: 340 mL     ,   Chest Tube:      NG Tube:         Physical Examination:  General: NAD, resting comfortably in bed  HEENT: Normocephalic atraumatic  Respiratory: Nonlabored breathing on room air  Cardio: Regular rate  Abdomen: softly distended, nontender, puncture site dressing in place that is c/d/i  Vascular: extremities are warm and well perfused     Imaging:  No post-op imaging studies    Assessment:  66yMale patient S/P US guided liver mass biopsy by IR for evaluation of metastatic lesions.    Plan:  - IV Abx: continue Unasyn  - Pain control  - Diet: Regular  - Activity: bedrest complete, OOB and ambulate as tolerated  - DVT ppx: resumed LVX      Date/Time: 10-04-21 @ 15:13  D Team Surgery  [id number]

## 2021-10-05 NOTE — PROGRESS NOTE ADULT - ASSESSMENT
Assessment:          Plan: 66 M with Hx HTN, GERD, and recently diagnosed pancreatic mass, s/p robot converted to open Whipple on 9/6/21 transferred from [facility] for elevated LFTs, nausea, and emesis, found to have extensive metastatic disease in post-surgical site, likely due to rapid recurrence, with c/f biliary obstruction; now s/p biliary drain placement 9/28 2/2 signs & symptoms cholangitis.    Plan:  - Pain control PRN  - Continue compazine and baclofen  - Salt tabs TID for hyponatremia correction  - Continue home meds as ordered  - Regular diet  - Appreciate heme/onc recs  - Leukocytosis with WBC 16 today; f/u ID recommendations, currently on Unasyn, Afebrile, most recent blood cultures 10/2 NGTD  - DVT ppx: LVX  - Dispo planning    D Team Surgery  [id number]

## 2021-10-05 NOTE — PROGRESS NOTE ADULT - SUBJECTIVE AND OBJECTIVE BOX
66y old  Male who presents with a chief complaint of Nausea and Emesis (05 Oct 2021 02:45)      Interval history:  Afebrile, denies any abdominal pain, chronic back pain, unchanged.     Allergies:   hydrALAZINE (Vomiting)  hydrALAZINE (Vomiting; Nausea)  No Known Allergies      Antimicrobials      ampicillin/sulbactam  IVPB 3 Gram(s) IV Intermittent every 6 hours      REVIEW OF SYSTEMS:  No chest pain  No SOB  No N/V  No rash.     Vital Signs Last 24 Hrs  T(C): 36.6 (10-05-21 @ 12:30), Max: 36.6 (10-04-21 @ 20:40)  T(F): 97.9 (10-05-21 @ 12:30), Max: 97.9 (10-05-21 @ 00:59)  HR: 93 (10-05-21 @ 12:30) (79 - 100)  BP: 139/81 (10-05-21 @ 12:30) (112/69 - 147/81)  BP(mean): --  RR: 16 (10-05-21 @ 12:30) (16 - 18)  SpO2: 94% (10-05-21 @ 12:30) (93% - 96%)      PHYSICAL EXAM:  Patient in no acute distress. AAOX3.  Cardiovascular: S1S2 normal.  Lungs: + air entry B/L lung fields.  Gastrointestinal: soft, nontender, nondistended. + biliary drain.   Extremities: no edema.  IV sites not inflamed.                           9.9    16.57 )-----------( 571      ( 05 Oct 2021 07:25 )             29.1   10-05    134<L>  |  103  |  8   ----------------------------<  124<H>  3.8   |  21<L>  |  0.46<L>    Ca    11.8<H>      05 Oct 2021 07:25  Phos  3.1     10-05  Mg     2.30     10-05    TPro  6.7  /  Alb  2.7<L>  /  TBili  6.2<H>  /  DBili  4.4<H>  /  AST  51<H>  /  ALT  43<H>  /  AlkPhos  133<H>  10-05      LIVER FUNCTIONS - ( 05 Oct 2021 07:25 )  Alb: 2.7 g/dL / Pro: 6.7 g/dL / ALK PHOS: 133 U/L / ALT: 43 U/L / AST: 51 U/L / GGT: x             Culture - Blood (10.02.21 @ 02:05)   Specimen Source: .Blood Blood   Culture Results:   No growth to date.

## 2021-10-05 NOTE — PROGRESS NOTE ADULT - SUBJECTIVE AND OBJECTIVE BOX
SURGERY PROGRESS NOTE    Subjective:     Vital Signs:  Vital Signs Last 24 Hrs  T(C): 36.6 (05 Oct 2021 00:59), Max: 36.7 (04 Oct 2021 09:00)  T(F): 97.9 (05 Oct 2021 00:59), Max: 98.1 (04 Oct 2021 09:00)  HR: 79 (05 Oct 2021 00:59) (79 - 94)  BP: 130/68 (05 Oct 2021 00:59) (112/69 - 133/62)  BP(mean): --  RR: 16 (05 Oct 2021 00:59) (16 - 18)  SpO2: 94% (05 Oct 2021 00:59) (93% - 97%)    Physical Exam:  General:   Lungs:   CV:   Abdomen:  Extremities:          D TEAM Surgery Progress Note  Patient is a 66y old  Male who presents with a chief complaint of Nausea and Emesis (05 Oct 2021 02:45)      INTERVAL EVENTS: Patient is PPD#7 s/p internal external biliary drain placement. Now PPD#1 s/p IR liver biopsy. No acute events overnight.  SUBJECTIVE: Patient seen and examined at bedside with surgical team, patient without complaints. Denies abdominal pain. Denies fevers, chills. Tolerating regular diet. Denies fever, chills, CP, SOB nausea, vomiting.    OBJECTIVE:    Vital Signs Last 24 Hrs  T(C): 36.3 (05 Oct 2021 07:57), Max: 36.7 (04 Oct 2021 09:00)  T(F): 97.4 (05 Oct 2021 07:57), Max: 98.1 (04 Oct 2021 09:00)  HR: 99 (05 Oct 2021 07:57) (79 - 100)  BP: 119/73 (05 Oct 2021 07:57) (112/69 - 147/81)  BP(mean): --  RR: 18 (05 Oct 2021 07:57) (16 - 18)  SpO2: 93% (05 Oct 2021 07:57) (93% - 97%)I&O's Detail    04 Oct 2021 07:01  -  05 Oct 2021 07:00  --------------------------------------------------------  IN:    Oral Fluid: 360 mL  Total IN: 360 mL    OUT:    Drain (mL): 1115 mL    Voided (mL): 1480 mL  Total OUT: 2595 mL    Total NET: -2235 mL      05 Oct 2021 07:01  -  05 Oct 2021 08:52  --------------------------------------------------------  IN:  Total IN: 0 mL    OUT:    Drain (mL): 120 mL    Voided (mL): 120 mL  Total OUT: 240 mL    Total NET: -240 mL      MEDICATIONS  (STANDING):  ampicillin/sulbactam  IVPB      ampicillin/sulbactam  IVPB 3 Gram(s) IV Intermittent every 6 hours  cloNIDine 0.1 milliGRAM(s) Oral two times a day  enoxaparin Injectable 40 milliGRAM(s) SubCutaneous daily  potassium phosphate / sodium phosphate Tablet (K-PHOS No. 2) 2 Tablet(s) Oral four times a day with meals  sodium chloride 2 Gram(s) Oral every 8 hours    MEDICATIONS  (PRN):  prochlorperazine   Tablet 10 milliGRAM(s) Oral every 6 hours PRN Nausea/vomiting/hiccups      PHYSICAL EXAM:  Constitutional: A&Ox3, NAD  Respiratory: Unlabored breathing  Abdomen: Soft, nondistended, NTTP. No rebound or guarding. Well healed midline scar. Biliary drain patent with bilious output.  Extremities: WWP, RICHTER spontaneously    LABS:                        9.9    16.57 )-----------( 571      ( 05 Oct 2021 07:25 )             29.1     10-05    134<L>  |  103  |  8   ----------------------------<  124<H>  3.8   |  21<L>  |  0.46<L>    Ca    11.8<H>      05 Oct 2021 07:25  Phos  3.1     10-05  Mg     2.30     10-05    TPro  5.5<L>  /  Alb  2.5<L>  /  TBili  5.7<H>  /  DBili  x   /  AST  35  /  ALT  38  /  AlkPhos  114  10-04    PT/INR - ( 05 Oct 2021 07:25 )   PT: 13.1 sec;   INR: 1.15 ratio         PTT - ( 05 Oct 2021 07:25 )  PTT:25.8 sec  LIVER FUNCTIONS - ( 04 Oct 2021 07:12 )  Alb: 2.5 g/dL / Pro: 5.5 g/dL / ALK PHOS: 114 U/L / ALT: 38 U/L / AST: 35 U/L / GGT: x

## 2021-10-05 NOTE — PROGRESS NOTE ADULT - ASSESSMENT
67 y/o M PMHx etOH abuse, HCV (s/p treatment), and recently diagnosed pancreatic CA (s/p open Whipple on 9/6/21), initially presented with N/V, found to have biliary obstruction 2/2 malignancy. Developed cholangitis and e.coli bacteremia, now s/p perc biliary drain placement on 9/28.     #E. Coli Bacteremia 2/2 Cholangitis, Biliary Obstruction - pancreatic CA (s/p Whipple last month) now with POD and acute biliary obstruction c/b cholangitis and pan-sensitive e. coli bacteremia, s/p perc biliary drain. Bacteremia has persisted despite appropriate abx therapy, raising concern for interval development of intra-abdominal abscess. No worsening back pain to suggest vertebral focus of infection.    Overall E. Coli Bacteremia, Cholangitis, Biliary Obstruction, transaminitis.   recent blood cx negative  s/p bx of liver lesion       Recs:  - c/w Unasyn 3g q6h whilw inpt   - recent CT abdomen, with liver lesions concerning for mets ds   - s/p liver bx   - no worsening back pain, back pain going on for years.   - repeat blood cultures NTD   - will need to follow up bx results, if compatible with mets ds, needs 14 days of abx from negative cx  - if compatible with abscess will need 4-6 weeks of abx   - can switch to cipro 500 mg po bid and flagyl 500 mg  bid on discharge.   - new leucocytosis, likely reactive due to procedure. Trend CBC for leucocytosis       Ilan Al  Pager: [phone]. If no response or past 5 pm call [phone].

## 2021-10-06 NOTE — CHART NOTE - NSCHARTNOTEFT_GEN_A_CORE
Brief IR Note     - Plan for biliary stent placement 10/7 pending IR suite availability.   - Please make NPO after midnight.   - Hold 10/6 PM and 10/7 AM anticoagulation   - 4 AM CBC/BMP/Coags  - Maintain internal/external biliary drains at this time, upon discharge will need outpatient follow-up at [facility] or [place] for drain exchange in 3 months.  - Discussed with Dr. Martínez who discussed case with Dr. Johnson. Brief IR Note     - Plan for biliary stent placement 10/7 pending IR suite availability.   - Please place IR procedure order under Dr. Paul Adan.   - Please make NPO after midnight.   - Hold 10/6 PM and 10/7 AM anticoagulation   - 4 AM CBC/BMP/Coags  - Maintain internal/external biliary drains at this time, upon discharge will need outpatient follow-up at [facility] or [place] for drain exchange in 3 months.  - Discussed with Dr. Martínez who discussed case with Dr. Johnson.

## 2021-10-06 NOTE — PROGRESS NOTE ADULT - SUBJECTIVE AND OBJECTIVE BOX
INTERVAL History:    Allergies    No Known Allergies    Intolerances    hydrALAZINE (Vomiting)  hydrALAZINE (Vomiting; Nausea)      MEDICATIONS  (STANDING):  ampicillin/sulbactam  IVPB      ampicillin/sulbactam  IVPB 3 Gram(s) IV Intermittent every 6 hours  cloNIDine 0.1 milliGRAM(s) Oral two times a day  enoxaparin Injectable 40 milliGRAM(s) SubCutaneous daily  potassium chloride    Tablet ER 20 milliEquivalent(s) Oral once  potassium phosphate / sodium phosphate Tablet (K-PHOS No. 2) 2 Tablet(s) Oral four times a day with meals  sodium chloride 2 Gram(s) Oral every 8 hours    MEDICATIONS  (PRN):  prochlorperazine   Tablet 10 milliGRAM(s) Oral every 6 hours PRN Nausea/vomiting/hiccups      Vital Signs Last 24 Hrs  T(C): 36.2 (06 Oct 2021 04:29), Max: 36.7 (05 Oct 2021 21:29)  T(F): 97.2 (06 Oct 2021 04:29), Max: 98.1 (05 Oct 2021 21:29)  HR: 99 (06 Oct 2021 04:29) (89 - 100)  BP: 146/82 (06 Oct 2021 04:29) (120/70 - 146/82)  BP(mean): --  RR: 18 (06 Oct 2021 04:29) (16 - 18)  SpO2: 94% (06 Oct 2021 04:29) (94% - 97%)    PHYSICAL EXAM:          LABS:                        9.0    19.21 )-----------( 592      ( 06 Oct 2021 06:24 )             26.4     10-06    137  |  102  |  10  ----------------------------<  117<H>  3.6   |  23  |  0.51    Ca    12.5<H>      06 Oct 2021 06:24  Phos  3.1     10-06  Mg     2.10     10-06    TPro  6.3  /  Alb  2.7<L>  /  TBili  5.6<H>  /  DBili  4.2<H>  /  AST  159<H>  /  ALT  69<H>  /  AlkPhos  126<H>  10-06    PT/INR - ( 05 Oct 2021 07:25 )   PT: 13.1 sec;   INR: 1.15 ratio         PTT - ( 05 Oct 2021 07:25 )  PTT:25.8 sec        RADIOLOGY & ADDITIONAL STUDIES:    PATHOLOGY:         INTERVAL History:    Allergies    No Known Allergies    Intolerances    hydrALAZINE (Vomiting)  hydrALAZINE (Vomiting; Nausea)      MEDICATIONS  (STANDING):  ampicillin/sulbactam  IVPB      ampicillin/sulbactam  IVPB 3 Gram(s) IV Intermittent every 6 hours  cloNIDine 0.1 milliGRAM(s) Oral two times a day  enoxaparin Injectable 40 milliGRAM(s) SubCutaneous daily  potassium chloride    Tablet ER 20 milliEquivalent(s) Oral once  potassium phosphate / sodium phosphate Tablet (K-PHOS No. 2) 2 Tablet(s) Oral four times a day with meals  sodium chloride 2 Gram(s) Oral every 8 hours    MEDICATIONS  (PRN):  prochlorperazine   Tablet 10 milliGRAM(s) Oral every 6 hours PRN Nausea/vomiting/hiccups      Vital Signs Last 24 Hrs  T(C): 36.2 (06 Oct 2021 04:29), Max: 36.7 (05 Oct 2021 21:29)  T(F): 97.2 (06 Oct 2021 04:29), Max: 98.1 (05 Oct 2021 21:29)  HR: 99 (06 Oct 2021 04:29) (89 - 100)  BP: 146/82 (06 Oct 2021 04:29) (120/70 - 146/82)  BP(mean): --  RR: 18 (06 Oct 2021 04:29) (16 - 18)  SpO2: 94% (06 Oct 2021 04:29) (94% - 97%)    PHYSICAL EXAM:          LABS:                        9.0    19.21 )-----------( 592      ( 06 Oct 2021 06:24 )             26.4     10-06    137  |  102  |  10  ----------------------------<  117<H>  3.6   |  23  |  0.51    Ca    12.5<H>      06 Oct 2021 06:24  Phos  3.1     10-06  Mg     2.10     10-06    TPro  6.3  /  Alb  2.7<L>  /  TBili  5.6<H>  /  DBili  4.2<H>  /  AST  159<H>  /  ALT  69<H>  /  AlkPhos  126<H>  10-06    PT/INR - ( 05 Oct 2021 07:25 )   PT: 13.1 sec;   INR: 1.15 ratio         PTT - ( 05 Oct 2021 07:25 )  PTT:25.8 sec        RADIOLOGY & ADDITIONAL STUDIES: Reviewed    PATHOLOGY: Cytopathology - Non Gyn Report:   ACCESSION No: [id number]   Patient: MARIA EUGENIA CLARKE   Accession: [id number]   Collected Date/Time: 10/4/2021 10:45 EDT   Received Date/Time: 10/4/2021 18:28 EDT   FineNeedle Aspiration Report - Auth (Verified)   Specimen(s) Submitted   LIVER, CT GUIDED CORE BIOPSY   Final Diagnosis   LIVER, CT GUIDED CORE BIOPSY   POSITIVE FOR MALIGNANT CELLS.   Poorly differentiated adenocarcinoma, favor pancreatico biliary origin.   See also pathology report [id number].   The touch prep slides arecomposed of crowded 3-dimensional groups and   single lying malignant cells with enlarged, hyperchromatic nuclei   containing prominent nucleoli and scant to vacuolated cytoplasm,in a   background of necrotic debris.   Core biopsy consists of liver with poorly differentiated adenocarcinoma   with necrosis.   Screened by: Chaka CHEUNG(ASCP)   Verified by: BG PLATA MD   (Electronic Signature)   Reported on:10/05/21 16:34 EDT, [address] [facility]. [address] [address]   Phone: [phone] Fax: [phone]   Cytology technical processing performed at [address] [place] [id number]   _________________________________________________________________          INTERVAL History: Pt seen and examined at bedside. Pt feels well except states that his hiccups are very bothersome. He endorses trying to keep hydrated and eating solid foods, he enjoys drinking Ensure. No abdominal pain.     Allergies    No Known Allergies    Intolerances    hydrALAZINE (Vomiting)  hydrALAZINE (Vomiting; Nausea)      MEDICATIONS  (STANDING):  ampicillin/sulbactam  IVPB      ampicillin/sulbactam  IVPB 3 Gram(s) IV Intermittent every 6 hours  cloNIDine 0.1 milliGRAM(s) Oral two times a day  enoxaparin Injectable 40 milliGRAM(s) SubCutaneous daily  potassium chloride    Tablet ER 20 milliEquivalent(s) Oral once  potassium phosphate / sodium phosphate Tablet (K-PHOS No. 2) 2 Tablet(s) Oral four times a day with meals  sodium chloride 2 Gram(s) Oral every 8 hours    MEDICATIONS  (PRN):  prochlorperazine   Tablet 10 milliGRAM(s) Oral every 6 hours PRN Nausea/vomiting/hiccups      Vital Signs Last 24 Hrs  T(C): 36.2 (06 Oct 2021 04:29), Max: 36.7 (05 Oct 2021 21:29)  T(F): 97.2 (06 Oct 2021 04:29), Max: 98.1 (05 Oct 2021 21:29)  HR: 99 (06 Oct 2021 04:29) (89 - 100)  BP: 146/82 (06 Oct 2021 04:29) (120/70 - 146/82)  BP(mean): --  RR: 18 (06 Oct 2021 04:29) (16 - 18)  SpO2: 94% (06 Oct 2021 04:29) (94% - 97%)    PHYSICAL EXAM:  Constitutional: NAD +jaundice   HEAD/EYES: +scleral icterus, no conjunctival injection  Respiratory:  + air entry b/l  GI:  + biliary drain, distended, nontender, no guarding  :  no uribe, no CVA tenderness  Musculoskeletal:  no joint swelling   Neurologic: awake and alert,  no focal findings  Skin:  no rash, no erythema, no phlebitis  Extremities: mild edema   Psychiatric:  awake, alert, appropriate mood        LABS:                        9.0    19.21 )-----------( 592      ( 06 Oct 2021 06:24 )             26.4     10-06    137  |  102  |  10  ----------------------------<  117<H>  3.6   |  23  |  0.51    Ca    12.5<H>      06 Oct 2021 06:24  Phos  3.1     10-06  Mg     2.10     10-06    TPro  6.3  /  Alb  2.7<L>  /  TBili  5.6<H>  /  DBili  4.2<H>  /  AST  159<H>  /  ALT  69<H>  /  AlkPhos  126<H>  10-06    PT/INR - ( 05 Oct 2021 07:25 )   PT: 13.1 sec;   INR: 1.15 ratio         PTT - ( 05 Oct 2021 07:25 )  PTT:25.8 sec        RADIOLOGY & ADDITIONAL STUDIES: Reviewed    PATHOLOGY: Cytopathology - Non Gyn Report:   ACCESSION No: [id number]   Patient: MARIA EUGENIA CLARKE   Accession: [id number]   Collected Date/Time: 10/4/2021 10:45 EDT   Received Date/Time: 10/4/2021 18:28 EDT   FineNeedle Aspiration Report - Auth (Verified)   Specimen(s) Submitted   LIVER, CT GUIDED CORE BIOPSY   Final Diagnosis   LIVER, CT GUIDED CORE BIOPSY   POSITIVE FOR MALIGNANT CELLS.   Poorly differentiated adenocarcinoma, favor pancreatico biliary origin.   See also pathology report [id number].   The touch prep slides arecomposed of crowded 3-dimensional groups and   single lying malignant cells with enlarged, hyperchromatic nuclei   containing prominent nucleoli and scant to vacuolated cytoplasm,in a   background of necrotic debris.   Core biopsy consists of liver with poorly differentiated adenocarcinoma   with necrosis.   Screened by: Chaka CHEUNG(ASCP)   Verified by: BG PLATA MD   (Electronic Signature)   Reported on:10/05/21 16:34 EDT, [address] [facility]. [place] [address], [place] [address]   Phone: [phone] Fax: [phone]   Cytology technical processing performed at [address] [place] [id number]   _________________________________________________________________

## 2021-10-06 NOTE — PHYSICAL THERAPY INITIAL EVALUATION ADULT - PERTINENT HX OF CURRENT PROBLEM, REHAB EVAL
Per documentation, pt. s/p robot converted to open Whipple on 9/6/21, transferred from [facility] for elevated LFTs, nausea, and emesis, found to have extensive metastatic disease in post-surgical site. Now s/p biliary drain placement 9/28. s/p liver biopsy 10/4.

## 2021-10-06 NOTE — PROGRESS NOTE ADULT - ASSESSMENT
66M w/PMHx of HTN, GERD, and recently diagnosed pancreatic mass, s/p robot converted to open Whipple on 8/6/21 who was transferred from [facility] to [facility] due to worsening nausea and vomiting over the last 5 found to have extensive metastatic disease on CT A/P at OSH    #Recently diagnosed Stage III pancreatic cancer, now with suspected metastatic disease   Pt initially admitted to [facility] in July 2021 with painless jaundice, found to have an infiltrating pancreatic mass in the pancreatic head with obstruction and dilatation of the biliary tree and pancreatic duct. He underwent endoscopic U/S, documenting a 2.7 cm pancreatic head mass s/p bx and sphincterotomy with placement of bile duct stent. Pt was opposed to neoadjuvant treatment and completed an open Whipple resection 8/6/21, 3.8 x 3.4 x 2.7cm invasive pancreatic adeno ca, moderately to poorly differentiated, 14/42 LN were positive, surgical margins were positive at the pancreatic neck, uncinate and hepatic duct margins. LVI and PNI was present. Final staging was [id number] (stage III)   The patient had discussions with his oncologist regarding starting adjuvant therapy with modified FOLFIRINOX vs gemcitabine and capecitabine as pt was hesitant to proceed with chemotherapy.    Ct A/P now showing suspected metastatic disease in the jude hepatis, celiac axis and peritoneal cavity with likely liver mets and severe intrahepatic biliary dilatation.    CT chest w IV contrast showed no pulm nodules, 2cm LN in right cardiophrenic angle has increased in size when compared to prior CT.   Pt s/p IR guided biliary drain placement on 9/28  S/p IR guided liver bx of metastatic lesion- path consistent with poorly differentiated adeno ca, favoring pancreaticobiliary orign.   Path confirms stage IV disease- no further workup needed from onc standpoint. No objection for discharge planning form onc- will update pt's oncologist in [place] and followup outpatient.   No plans for any inpatient systemic therapy.   Will update pt’s primary oncologist Dr. Boyd at [facility] who pt wishes to follow up with upon discharge.       Judy Medina MD  Hematology Oncology Fellow, PGY-6  [facility] Pager: [phone]/ [facility] Pager: [phone]       66M w/PMHx of HTN, GERD, and recently diagnosed pancreatic mass, s/p robot converted to open Whipple on 8/6/21 who was transferred from [facility] to [facility] due to worsening nausea and vomiting over the last 5 found to have extensive metastatic disease on CT A/P at OSH    #Recently diagnosed Stage III pancreatic cancer, now with suspected metastatic disease   Pt initially admitted to [facility] in July 2021 with painless jaundice, found to have an infiltrating pancreatic mass in the pancreatic head with obstruction and dilatation of the biliary tree and pancreatic duct. He underwent endoscopic U/S, documenting a 2.7 cm pancreatic head mass s/p bx and sphincterotomy with placement of bile duct stent. Pt was opposed to neoadjuvant treatment and completed an open Whipple resection 8/6/21, 3.8 x 3.4 x 2.7cm invasive pancreatic adeno ca, moderately to poorly differentiated, 14/42 LN were positive, surgical margins were positive at the pancreatic neck, uncinate and hepatic duct margins. LVI and PNI was present. Final staging was [id number] (stage III)   The patient had discussions with his oncologist regarding starting adjuvant therapy with modified FOLFIRINOX vs gemcitabine and capecitabine as pt was hesitant to proceed with chemotherapy.    Ct A/P now showing suspected metastatic disease in the jude hepatis, celiac axis and peritoneal cavity with likely liver mets and severe intrahepatic biliary dilatation.    CT chest w IV contrast showed no pulm nodules, 2cm LN in right cardiophrenic angle has increased in size when compared to prior CT.   Pt s/p IR guided biliary drain placement on 9/28  S/p IR guided liver bx of metastatic lesion- path consistent with poorly differentiated adeno ca, favoring pancreaticobiliary orign.   Path confirms stage IV disease- no further workup needed from onc standpoint. No objection for discharge planning from onc- will update pt's oncologist in [place] and followup outpatient.   No plans for any inpatient systemic therapy.   Will update pt’s primary oncologist Dr. Boyd at [facility] who pt wishes to follow up with upon discharge.       Judy Medina MD  Hematology Oncology Fellow, PGY-6  [facility] Pager: [phone]/ [facility] Pager: [phone]

## 2021-10-06 NOTE — PROGRESS NOTE ADULT - SUBJECTIVE AND OBJECTIVE BOX
SURGERY PROGRESS NOTE    Subjective:     Vital Signs:  Vital Signs Last 24 Hrs  T(C): 36.6 (06 Oct 2021 00:10), Max: 36.7 (05 Oct 2021 21:29)  T(F): 97.8 (06 Oct 2021 00:10), Max: 98.1 (05 Oct 2021 21:29)  HR: 89 (06 Oct 2021 00:10) (89 - 100)  BP: 128/77 (06 Oct 2021 00:10) (119/73 - 147/81)  BP(mean): --  RR: 18 (06 Oct 2021 00:10) (16 - 18)  SpO2: 94% (06 Oct 2021 00:10) (93% - 97%)    Physical Exam:  General:   Lungs:   CV:   Abdomen:  Extremities:          D TEAM Surgery Progress Note  Patient is a 66y old  Male who presents with a chief complaint of Nausea and Emesis (06 Oct 2021 03:37)      INTERVAL EVENTS: Patient is PPD#8 s/p internal external biliary drain placement. Now PPD#2 s/p IR liver biopsy. No acute events overnight.  SUBJECTIVE: Patient seen and examined at bedside with surgical team, patient without complaints. Denies abdominal pain. Denies fevers, chills. Tolerating regular diet. Denies fever, chills, CP, SOB nausea, vomiting.      OBJECTIVE:    Vital Signs Last 24 Hrs  T(C): 36.2 (06 Oct 2021 04:29), Max: 36.7 (05 Oct 2021 21:29)  T(F): 97.2 (06 Oct 2021 04:29), Max: 98.1 (05 Oct 2021 21:29)  HR: 99 (06 Oct 2021 04:29) (89 - 100)  BP: 146/82 (06 Oct 2021 04:29) (119/73 - 146/82)  BP(mean): --  RR: 18 (06 Oct 2021 04:29) (16 - 18)  SpO2: 94% (06 Oct 2021 04:29) (93% - 97%)I&O's Detail    04 Oct 2021 07:01  -  05 Oct 2021 07:00  --------------------------------------------------------  IN:    Oral Fluid: 360 mL  Total IN: 360 mL    OUT:    Drain (mL): 1115 mL    Voided (mL): 1480 mL  Total OUT: 2595 mL    Total NET: -2235 mL      05 Oct 2021 07:01  -  06 Oct 2021 06:48  --------------------------------------------------------  IN:    IV PiggyBack: 100 mL  Total IN: 100 mL    OUT:    Drain (mL): 805 mL    Voided (mL): 470 mL  Total OUT: 1275 mL    Total NET: -1175 mL      MEDICATIONS  (STANDING):  ampicillin/sulbactam  IVPB      ampicillin/sulbactam  IVPB 3 Gram(s) IV Intermittent every 6 hours  cloNIDine 0.1 milliGRAM(s) Oral two times a day  enoxaparin Injectable 40 milliGRAM(s) SubCutaneous daily  potassium phosphate / sodium phosphate Tablet (K-PHOS No. 2) 2 Tablet(s) Oral four times a day with meals  sodium chloride 2 Gram(s) Oral every 8 hours    MEDICATIONS  (PRN):  prochlorperazine   Tablet 10 milliGRAM(s) Oral every 6 hours PRN Nausea/vomiting/hiccups    PHYSICAL EXAM:  Constitutional: A&Ox3, NAD  Respiratory: Unlabored breathing  Abdomen: Soft, nondistended, NTTP. No rebound or guarding. Well healed midline scar. Biliary drain patent with bilious output.  Extremities: WWP, RICHTER spontaneously      LABS:                        9.9    16.57 )-----------( 571      ( 05 Oct 2021 07:25 )             29.1     10-05    134<L>  |  103  |  8   ----------------------------<  124<H>  3.8   |  21<L>  |  0.46<L>    Ca    11.8<H>      05 Oct 2021 07:25  Phos  3.1     10-05  Mg     2.30     10-05    TPro  6.7  /  Alb  2.7<L>  /  TBili  6.2<H>  /  DBili  4.4<H>  /  AST  51<H>  /  ALT  43<H>  /  AlkPhos  133<H>  10-05    PT/INR - ( 05 Oct 2021 07:25 )   PT: 13.1 sec;   INR: 1.15 ratio         PTT - ( 05 Oct 2021 07:25 )  PTT:25.8 sec  LIVER FUNCTIONS - ( 05 Oct 2021 07:25 )  Alb: 2.7 g/dL / Pro: 6.7 g/dL / ALK PHOS: 133 U/L / ALT: 43 U/L / AST: 51 U/L / GGT: x                            D TEAM Surgery Progress Note  Patient is a 66y old  Male who presents with a chief complaint of Nausea and Emesis (06 Oct 2021 03:37)      INTERVAL EVENTS: Patient is PPD#8 s/p internal external biliary drain placement. Now PPD#2 s/p IR liver biopsy. No acute events overnight.  SUBJECTIVE: Patient seen and examined at bedside with surgical team, patient without complaints. Denies abdominal pain. Denies fevers, chills. Tolerating regular diet, but had 1 episode of emesis. Denies fever, chills, CP, SOB.       OBJECTIVE:    Vital Signs Last 24 Hrs  T(C): 36.2 (06 Oct 2021 04:29), Max: 36.7 (05 Oct 2021 21:29)  T(F): 97.2 (06 Oct 2021 04:29), Max: 98.1 (05 Oct 2021 21:29)  HR: 99 (06 Oct 2021 04:29) (89 - 100)  BP: 146/82 (06 Oct 2021 04:29) (119/73 - 146/82)  BP(mean): --  RR: 18 (06 Oct 2021 04:29) (16 - 18)  SpO2: 94% (06 Oct 2021 04:29) (93% - 97%)I&O's Detail    04 Oct 2021 07:01  -  05 Oct 2021 07:00  --------------------------------------------------------  IN:    Oral Fluid: 360 mL  Total IN: 360 mL    OUT:    Drain (mL): 1115 mL    Voided (mL): 1480 mL  Total OUT: 2595 mL    Total NET: -2235 mL      05 Oct 2021 07:01  -  06 Oct 2021 06:48  --------------------------------------------------------  IN:    IV PiggyBack: 100 mL  Total IN: 100 mL    OUT:    Drain (mL): 805 mL    Voided (mL): 470 mL  Total OUT: 1275 mL    Total NET: -1175 mL      MEDICATIONS  (STANDING):  ampicillin/sulbactam  IVPB      ampicillin/sulbactam  IVPB 3 Gram(s) IV Intermittent every 6 hours  cloNIDine 0.1 milliGRAM(s) Oral two times a day  enoxaparin Injectable 40 milliGRAM(s) SubCutaneous daily  potassium phosphate / sodium phosphate Tablet (K-PHOS No. 2) 2 Tablet(s) Oral four times a day with meals  sodium chloride 2 Gram(s) Oral every 8 hours    MEDICATIONS  (PRN):  prochlorperazine   Tablet 10 milliGRAM(s) Oral every 6 hours PRN Nausea/vomiting/hiccups    PHYSICAL EXAM:  Constitutional: A&Ox3, NAD  Respiratory: Unlabored breathing  Abdomen: Soft, nondistended, NTTP. No rebound or guarding. Well healed midline scar. Biliary drain patent with bilious output.  Extremities: WWP, RICHTER spontaneously      LABS:                        9.9    16.57 )-----------( 571      ( 05 Oct 2021 07:25 )             29.1     10-05    134<L>  |  103  |  8   ----------------------------<  124<H>  3.8   |  21<L>  |  0.46<L>    Ca    11.8<H>      05 Oct 2021 07:25  Phos  3.1     10-05  Mg     2.30     10-05    TPro  6.7  /  Alb  2.7<L>  /  TBili  6.2<H>  /  DBili  4.4<H>  /  AST  51<H>  /  ALT  43<H>  /  AlkPhos  133<H>  10-05    PT/INR - ( 05 Oct 2021 07:25 )   PT: 13.1 sec;   INR: 1.15 ratio         PTT - ( 05 Oct 2021 07:25 )  PTT:25.8 sec  LIVER FUNCTIONS - ( 05 Oct 2021 07:25 )  Alb: 2.7 g/dL / Pro: 6.7 g/dL / ALK PHOS: 133 U/L / ALT: 43 U/L / AST: 51 U/L / GGT: x

## 2021-10-06 NOTE — PROGRESS NOTE ADULT - ASSESSMENT
Assessment:          Plan: 66 M with Hx HTN, GERD, and recently diagnosed pancreatic mass, s/p robot converted to open Whipple on 9/6/21 transferred from [facility] for elevated LFTs, nausea, and emesis, found to have extensive metastatic disease in post-surgical site, likely due to rapid recurrence, with c/f biliary obstruction; now s/p biliary drain placement 9/28 2/2 signs & symptoms cholangitis.    Plan:  - Pain control PRN  - Continue compazine and baclofen  - Salt tabs TID for hyponatremia correction  - Continue home meds as ordered  - Regular diet  - Appreciate heme/onc recs  - Leukocytosis ****; f/u ID recommendations, currently on Unasyn, will transition to Cipro/Flagyl on discharge; most recent blood cultures 10/2 NGTD  - DVT ppx: LVX  - Dispo planning    D Team Surgery  [id number] 66 M with Hx HTN, GERD, and recently diagnosed pancreatic mass, s/p robot converted to open Whipple on 9/6/21 transferred from [facility] for elevated LFTs, nausea, and emesis, found to have extensive metastatic disease in post-surgical site, likely due to rapid recurrence, with c/f biliary obstruction; now s/p biliary drain placement 9/28 2/2 signs & symptoms cholangitis.    Plan:  - Pain control PRN  - Continue compazine and baclofen  - Salt tabs TID for hyponatremia correction  - Continue home meds as ordered  - Regular diet  - Appreciate heme/onc recs  - monitor leukyocytosis; Appreciate ID recommendations, currently on Unasyn, will transition to Cipro/Flagyl on discharge; most recent blood cultures 10/2 NGTD  - PT consult  - DVT ppx: LVX  - Dispo planning    D Team Surgery  [id number]
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
## 2021-10-06 NOTE — PROGRESS NOTE ADULT - ATTENDING COMMENTS
Patient is s/p IR-guided liver biopsy of liver lesions. Pathology from biopsy discussed with the patient, which is c/w metastatic pancreatic cancer as suspected. When patient is stable for discharge he can follow up with his primary oncologist, DR. Pichardo at [facility] for definitive treatment planning. Discussed plans for palliative chemotherapy for disease control and improvement of symptoms with the patient at bedside. He is in agreement.

## 2021-10-07 NOTE — PROGRESS NOTE ADULT - ASSESSMENT
Assessment:          Plan: 66 M with Hx HTN, GERD, and recently diagnosed pancreatic mass, s/p robot converted to open Whipple on 9/6/21 transferred from [facility] for elevated LFTs, nausea, and emesis, found to have extensive metastatic disease in post-surgical site, likely due to rapid recurrence, with c/f biliary obstruction; now s/p biliary drain placement 9/28 2/2 signs & symptoms cholangitis.    Plan:  - Pain control PRN  - Continue compazine and baclofen  - Salt tabs TID for hyponatremia correction  - Continue home meds as ordered  - Regular diet  - Appreciate heme/onc recs    - Calcitonin for hypercalcemia, may be causing his emesis  - monitor leukocytosis; Appreciate ID recommendations, currently on Unasyn, will transition to Cipro/Flagyl on discharge; most recent blood cultures 10/2 NGTD  - PT consult  - DVT ppx: LVX  - Dispo planning    D Team Surgery  [id number] 66 M with Hx HTN, GERD, and recently diagnosed pancreatic mass, s/p robot converted to open Whipple on 9/6/21 transferred from [facility] for elevated LFTs, nausea, and emesis, found to have extensive metastatic disease in post-surgical site, likely due to rapid recurrence, with c/f biliary obstruction; now s/p biliary drain placement 9/28 2/2 signs & symptoms cholangitis.    Plan:  - F/u PM labs, blood cultures, EKG  - Pain control PRN  - Continue compazine and baclofen  - Salt tabs TID for hyponatremia correction  - Continue home meds as ordered  - Regular diet  - Appreciate heme/onc recs    - Calcitonin for hypercalcemia, may be causing his emesis  - Monitor leukocytosis; Appreciate ID recommendations, currently on Unasyn, will transition to Cipro/Flagyl on discharge; most recent blood cultures 10/2 NGTD  - PT consult  - DVT ppx: LVX  - Dispo planning    D Team Surgery  [id number]

## 2021-10-07 NOTE — PROGRESS NOTE ADULT - ASSESSMENT
65 y/o M PMHx etOH abuse, HCV (s/p treatment), and recently diagnosed pancreatic CA (s/p open Whipple on 9/6/21), initially presented with N/V, found to have biliary obstruction 2/2 malignancy. Developed cholangitis and e.coli bacteremia, now s/p perc biliary drain placement on 9/28.     #E. Coli Bacteremia 2/2 Cholangitis, Biliary Obstruction - pancreatic CA (s/p Whipple last month) now with POD and acute biliary obstruction c/b cholangitis and pan-sensitive e. coli bacteremia, s/p perc biliary drain. Bacteremia has persisted despite appropriate abx therapy, raising concern for interval development of intra-abdominal abscess. No worsening back pain to suggest vertebral focus of infection.    Overall E. Coli Bacteremia, Cholangitis, Biliary Obstruction, transaminitis.   recent blood cx negative  s/p bx of liver lesion   rising leucocytosis, tachycardia, SIRS/Sepsis   hypercalcemia       Recs:  - switched to zosyn    - plan for repeat CT  - repeat blood cx  - check U/A   - s/p liver bx, path consistent with mets ds.    - Trend CBC for leucocytosis, rising   - hypercalcemia, management per primary       Plan discussed with Surgery team      Ilan Al  Pager: [phone]. If no response or past 5 pm call [phone].

## 2021-10-07 NOTE — CONSULT NOTE ADULT - PROBLEM SELECTOR RECOMMENDATION 2
- Miralax Daily and Senna 2 tablets at bedtime  - Goal BM 1-2x daily  - If no BM >2d, offer lactulose or dulcolax suppository or enema.

## 2021-10-07 NOTE — CONSULT NOTE ADULT - PROBLEM SELECTOR RECOMMENDATION 8
Recently diagnosed Stage III pancreatic cancer, now with suspected metastatic disease   Ct A/P now showing suspected metastatic disease in the jude hepatis, celiac axis and peritoneal cavity with likely liver mets and severe intrahepatic biliary dilatation.    CT chest w IV contrast showed no pulm nodules, 2cm LN in right cardiophrenic angle has increased in size when compared to prior CT.   S/p IR guided liver bx of metastatic lesion- path consistent with poorly differentiated adeno ca, favoring pancreaticobiliary orign.   Oncology recommendations appreciated

## 2021-10-07 NOTE — CHART NOTE - NSCHARTNOTEFT_GEN_A_CORE
Updated the patient about the progression of his liver and peritoneal metastases on the CT scan from today. Explained the poor prognosis given how quickly his disease has progressed. Pt wants to call his friend Regina Rhonda to make her his healthcare proxy in the event that he is not able to make decisions. We discussed DNR/DNI. He said that he "does not see a reason" to revive him given his poor prognosis, but wants to "think for a few minutes" about his decision before making himself DNR/DNI.     Discussed with the pt that he should have an NGT given the obstruction from the metastatic disease, but he wants to wait until he makes the decision regarding DNR/DNI and has spoken to Regina. He is worried about his cats and who will take care of them if he passes away.     Will plan to call Regina brewer on speaker phone to establish healthcare proxy and continue goals of care discussion. Will give fluid bolus and calcitonin for hypercalcemia as per nephrology.     Discussed with SICU.  Discussed with surgical oncology fellow Dr. Krystin Sexton MD  Chief Resident - R5, PGY7 Updated the patient about the progression of his liver and peritoneal metastases on the CT scan from today. Explained the poor prognosis given how quickly his disease has progressed. Pt wants to call his friend Regina Olmos to make her his healthcare proxy in the event that he is not able to make decisions. We discussed DNR/DNI. He said that he "does not see a reason" to revive him given his poor prognosis, but wants to "think for a few minutes" about his decision before making himself DNR/DNI.     Discussed with the pt that he should have an NGT given the obstruction from the metastatic disease, but he wants to wait until he makes the decision regarding DNR/DNI and has spoken to Regina. He is worried about his cats and who will take care of them if he passes away.     Will plan to call Regina brewer on speaker phone to establish healthcare proxy and continue goals of care discussion. Will give fluid bolus and calcitonin for hypercalcemia as per nephrology.     Discussed with SICU.  Discussed with surgical oncology fellow Dr. Krystin Sexton MD  Chief Resident - R5, PGY7    Addendum 7:15 pm    Spoke with patient and Regina Olmos on the phone. Updated Regina on the patient's CT scan, progression of disease, and poor prognosis. Pt has appointed Regina as his healthcare proxy to make decisions if he is incapacitated. Form was filled out with witnesses and placed in the chart.     Pt does not want to be DNR/DNI right now and is agreeable to the NGT. He told Regina that if he "is not going to be himself or functional" that she should let him be, but that he wants his heart revived to "have a chance to fight." He "trusts her" to make the right decisions. Regina told him that she will do "whatever he wants." She understands the clinical situation and is going to take care of his cats and come in to visit him.     NGT placed and CXR ordered for confirmation.     Discussed with SICU.   Discussed with surgical onoclogy fellow Dr. Mcclelland

## 2021-10-07 NOTE — CONSULT NOTE ADULT - SUBJECTIVE AND OBJECTIVE BOX
HPI:  66M w/PMHx of HTN, GERD, and recently diagnosed pancreatic mass, s/p robot converted to open Whipple on 9/6 who was transferred from [facility] to [facility] due to worsening nausea and vomiting over the last 5 days. Pt endorses a worsening in appetite and has had decreased PO intake over the last week 2/2 to fear of vomiting. Pt notes emesis to be bilious w/o evidence of blood. Pt denies abdominal and chest pain, fevers, chills, SOB, dizziness. Patient was afebrile, hemodynamically stable on presentation. Labs significant for elevated LFTs (T.bili 7.2,  / , AlkPhos 267). CT AP at [facility] demonstrated extensive metastatic disease in the jude hepatis, celiac axis, and peritoneal cavity with likely liver metastases and severe intrahepatic biliary dilatation.  (26 Sep 2021 00:09)    PERTINENT PM/SXH:   No pertinent past medical history  Malignant tumor of pancreas  Hypertension  Chronic GERD  Lumbar herniated disc  Vertigo  DVT, lower extremity  History of hepatitis C  H/O ETOH abuse  Pancreatic tumor  Elevated LFTs  No significant past surgical history  History of biliary duct stent placement  History of Whipple procedure    FAMILY HISTORY:  Family history of heart murmur (Mother)    ITEMS NOT CHECKED ARE NOT PRESENT    SOCIAL HISTORY:   Significant other/partner[x- girlfriend Regina ]  Children[x- 1 child in his 30s who he has not been in communication for over 15 years [ ]  Mandaeism/Spirituality: Uatsdin  Substance hx:  [ ]   Tobacco hx:  [x- former smoker ]   Alcohol hx: [x- hx of former alcohol abuse in ]   Home Opioid hx:  [ ] I-Stop Reference No:[id number]  Living Situation: [x ]Home  [ ]Long term care  [ ]Rehab [ ]Other      ADVANCE DIRECTIVES:    MOLST  [ ]  Living Will  [ ]   DECISION MAKER(s):  [ ] Health Care Proxy(s)  [ x] Surrogate(s)  [ ] Guardian           Name(s): Phone Number(s):  Regina Ellis : [phone]    BASELINE (I)ADL(s) (prior to admission):  [place]: [ x]Total  [ ] Moderate [ ]Dependent    Allergies    No Known Allergies    Intolerances    hydrALAZINE (Vomiting)  hydrALAZINE (Vomiting; Nausea)  MEDICATIONS  (STANDING):  calcitonin Injectable 290 International Unit(s) IntraMuscular every 12 hours  cloNIDine 0.1 milliGRAM(s) Oral two times a day  enoxaparin Injectable 40 milliGRAM(s) SubCutaneous daily  furosemide   Injectable 20 milliGRAM(s) IV Push once  piperacillin/tazobactam IVPB.. 3.375 Gram(s) IV Intermittent every 8 hours  potassium chloride  10 mEq/100 mL IVPB 10 milliEquivalent(s) IV Intermittent every 1 hour  potassium phosphate / sodium phosphate Tablet (K-PHOS No. 2) 2 Tablet(s) Oral four times a day with meals  sodium chloride 2 Gram(s) Oral every 8 hours  sodium chloride 0.9% Bolus 1000 milliLiter(s) IV Bolus once  sodium chloride 0.9%. 1000 milliLiter(s) (100 mL/Hr) IV Continuous <Continuous>    MEDICATIONS  (PRN):      PRESENT SYMPTOMS: [ ]Unable to obtain due to poor mentation   Source if other than patient:  [ ]Family   [ ]Team     Pain: [ ]yes [ x]no  QOL impact -   Location -                    Aggravating factors -  Quality -  Radiation -  Timing-  Severity (0-10 scale):  Minimal acceptable level (0-10 scale):     CPOT:    https://www.[facility].org/getattachment/[email or web address]/Critical-Care-Pain-Observation-Tool-(CPOT)      PAIN AD Score:     http://geriatrictoolkit.[place].[facility]/cog/painad.pdf (press ctrl +  left click to view)    Dyspnea:                           [ ]Mild [ ]Moderate [ ]Severe  Anxiety:                             [ ]Mild [ ]Moderate [ ]Severe  Agitation:                          [ ]Mild [ ]Moderate [ ]Severe  Fatigue:                             [ ]Mild [ ]Moderate [ ]Severe  Nausea:                             [ ]Mild [ ]Moderate [ ]Severe  Loss of appetite:              [ ]Mild [ ]Moderate [ ]Severe  Constipation:                   [ ]Mild [ ]Moderate [ ]Severe  Diarrhea:                          [ ]Mild [ ]Moderate [ ]Severe      Other Symptoms:  [x ]All other review of systems negative     Palliative Performance Status Version 2:    40-50     %    http://[facility].org/files/news/palliative_performance_scale_ppsv2.pdf    PHYSICAL EXAM:  Vital Signs Last 24 Hrs  T(C): 37.1 (07 Oct 2021 20:00), Max: 37.4 (07 Oct 2021 16:23)  T(F): 98.8 (07 Oct 2021 20:00), Max: 99.3 (07 Oct 2021 16:23)  HR: 105 (07 Oct 2021 21:00) (102 - 151)  BP: 122/59 (07 Oct 2021 21:00) (113/70 - 155/89)  BP(mean): 73 (07 Oct 2021 21:00) (73 - 99)  RR: 11 (07 Oct 2021 21:00) (11 - 27)  SpO2: 97% (07 Oct 2021 21:00) (90% - 100%)     I&O's Summary    06 Oct 2021 07:01  -  07 Oct 2021 07:00  --------------------------------------------------------  IN: 2090 mL / OUT: 2484 mL / NET: -394 mL    07 Oct 2021 07:01  -  07 Oct 2021 22:06  --------------------------------------------------------  IN: 900 mL / OUT: 500 mL / NET: 400 mL        GENERAL:  [x ]Alert  [ x]Oriented x 3  [ ]Lethargic  [ ]Cachexia  [ ]Unarousable  [ x]Verbal  [ ]Non-Verbal  [ x] No Distress  Behavioral:   [ ] Anxiety  [ ] Delirium [ ] Agitation [ x] Calm  [ ] Other  HEENT:  [ x]Normal  [ ] Temporal Wasting  [ ]Dry mouth   [ ]ET Tube/Trach  [ ]Oral lesions  [ ] Mucositis  PULMONARY:   [ ]Clear [ ]Tachypnea  [ ]Audible excessive secretions   [ ]Rhonchi        [ ]Right [ ]Left [ ]Bilateral  [ ]Crackles        [ ]Right [ ]Left [ ]Bilateral  [ ]Wheezing     [ ]Right [ ]Left [ ]Bilateral  [ x]Diminished breath sounds [ ]right [ ]left [ x]bilateral  CARDIOVASCULAR:    [ ]Regular [ ]Irregular [x ]Tachy  [ ][place] [ ]Murmur [ ]Other  GASTROINTESTINAL: Biliary drain patent with bilious output.  [x ]Soft  [ ]Distended   [ ]+BS  [x ]Non tender [ ]Tender  [ ]PEG [ ]OGT/ NGT  Last BM: 10/5  GENITOURINARY:  [x ]Normal [ ] Incontinent   [ ]Oliguria/Anuria   [ ][place]  MUSCULOSKELETAL:   [ ]Normal   [x ]Weakness  [ ]Bed/Wheelchair bound [ ]Edema  [  ] amputation  [  ] contraction  NEUROLOGIC:   [x ]No focal deficits  [ ]Cognitive impairment  [ ]Dysphagia [ ]Dysarthria [ ]Paresis [ ]Other   SKIN: Abdominal incision site See Nursing Skin Assessment for further details  [ ]Normal    [ ]Rash  [ ]Pressure ulcer(s)       Present on admission [ ]y [ ]n   [  ]  Wound    [  ] hyperpigmentation    CRITICAL CARE:  [ ] Shock Present  [ ]Septic [ ]Cardiogenic [ ]Neurologic [ ]Hypovolemic  [ ]  Vasopressors [ ]  Inotropes   [ ]Respiratory failure present [ ]Mechanical ventilation [ ]Non-invasive ventilatory support [ ]High flow    [ ]Acute  [ ]Chronic [ ]Hypoxic  [ ]Hypercarbic [ ]Other  [ ]Other organ failure     LABS:                        7.8    31.95 )-----------( 608      ( 07 Oct 2021 16:57 )             24.4   10-07    140  |  103  |  13  ----------------------------<  98  3.5   |  20<L>  |  0.71    Ca    14.3<HH>      07 Oct 2021 16:57  Phos  4.0     10-07  Mg     2.00     10-07    TPro  5.7<L>  /  Alb  2.6<L>  /  TBili  5.8<H>  /  DBili  x   /  AST  86<H>  /  ALT  54<H>  /  AlkPhos  111  10-07  PT/INR - ( 07 Oct 2021 16:57 )   PT: 15.5 sec;   INR: 1.38 ratio         PTT - ( 07 Oct 2021 16:57 )  PTT:23.7 sec    CAPILLARY BLOOD GLUCOSE    RADIOLOGY & ADDITIONAL STUDIES:  CT Abd/ Pelvis 10/7/2021  FINDINGS:  LOWER CHEST: Right pleural effusion is increased from 9/28/2021. New patchy right lower lobe opacities favoring infection.    LIVER:  *  New large hypodense necrotic mass/collection arising from the caudate lobe measuring approximately 6.7 x 6.3 x 5.8 cm (8:28 and 14:44), with mass effect and narrowing of the IVC, middle and left hepatic veins.  *  New necrotic mass/collection in segment 6 of the liver measuring approximately 7.8 x 6.4 x 8.3 cm (8:46 and 14:77).  *  Multiple additional hypodense liver masses, interval increase in size and number.    BILE DUCTS: Whipple with hepaticojejunostomy. Interval approach of the left hepatic percutaneous biliary drain with tip terminating in the small bowel.  GALLBLADDER: Cholecystectomy.  SPLEEN: Within normal limits.  PANCREAS: Whipple with pancreaticojejunostomy. Pancreatic stent.  ADRENALS: Within normal limits.  KIDNEYS/URETERS: Within normal limits.    BLADDER: Within normal limits.  REPRODUCTIVE ORGANS: Prostate within normal limits.    BOWEL/PERITONEUM/RETROPERITONEUM/LYMPH NODES: Whipple. Innumerable metastatic lymph node/mesenteric implants are increased in size from previous examination.  *  Confluent jude hepatis lymphadenopathy/mesenteric implants is indistinguishable from the bowel of the biliopancreatic limb and demonstrates mass effect on surrounding vessels. There is attenuation of the hepatic arterial vasculature, main portal vein, and portal confluence.  *  A necrotic mesenteric lymph node/implant measures 3.8 x 4.2 cm (8:65), previously measuring up to 2.2 cm on 9/25/2021.  *  An anterior abdominal implant/lymph node measures up to 4.5 x 4.6 cm (8:84) previously measuring up to 2.6 cm on 9/25/2021  *  New small abdominal ascites.    VESSELS: Atherosclerotic changes. Attenuated hepatic vasculature secondary to mass effect from surrounding disease, as above.    ABDOMINAL WALL: Postsurgical changes. Right lateral abdominal wall heterogeneous lesion of 4.3 cm, new.    BONES: Degenerative changes.    IMPRESSION:  New patchy right lower lobe opacities favoring infection. Increased right pleural effusion.    New large hypodense necrotic mass/collections arising from the caudate lobe and segment 6 of the liver. Caudate lobe mass demonstrates mass effect on the IVC and hepatic veins. These may represent metastatic disease or possibly abscesses.  Innumerable metastatic lymph node/mesenteric implants and hepatic masses are significantly increased in size from previous examination.    Confluent jude hepatis lymphadenopathy/mesenteric implants are indistinguishable from the bowel of the biliopancreatic limb and demonstrate mass effect on surrounding vessels. There is attenuation of the hepatic arterial vasculature, main portal vein, and portal confluence, which are otherwise patent.  A new heterogeneous mass in the right lateral abdominal wall may represent an additional focus of metastatic disease or a hematoma.  New small ascites.    XRAY Abdomen 10/7/2021  FINDINGS:  Nonobstructive bowel gas pattern. Surgical material overlies the abdomen.  Biliary drain unchanged in position.    IMPRESSION:  Nonobstructive bowel gas pattern.    CT Chest 9/28/2021    Few small lymph nodes are present in the right paratracheal space, pretracheal region, subcarinal space and AP window. Few small lymph nodes adjacent to the distal esophagus are unchanged when compared to prior CT scan of the abdomen of 9/25/2021. A 2 x 1 cm lymph node in the right cardiophrenic angle has increased in size when compared to prior CT scan of the abdomen dating back to 7/2/2021.    Heart is normal in size. No pericardial effusion is noted.    No endobronchial lesions are noted. A tracheal diverticulum is noted. No nodules are noted. Few linear opacities representing atelectasis/scarring are noted within both lungs. Very small pleural effusions are noted bilaterally.    For interpretation of the abdominal contents, please see the report of the CT scan of the abdomen performed on 9/25/2021.    Degenerative changes of the spine are noted.    IMPRESSION: No pulmonary nodules are noted.    2 cm lymph node in the right cardiophrenic angle has increased in size when compared to prior CT scan of the abdomen dating back to 7/2/2021. Etiology is unclear.      PROTEIN CALORIE MALNUTRITION PRESENT: [ ]mild [ ]moderate [ x]severe [ ]underweight [ ]morbid obesity  https://www.andeal.org/vault/2440/web/files/ONC/Table_Clinical%20Characteristics%20to%20Document%20Malnutrition-White%20JV%20et%20al%202012.pdf    Height (cm): 167.6 (09-26-21 @ 17:26), 167.6 (08-06-21 @ 06:45), 165.1 (07-28-21 @ 14:06)  Weight (kg): 73.3 (09-25-21 @ 22:50), 87.1 (08-06-21 @ 06:45), 88 (07-28-21 @ 14:06)  BMI (kg/m2): 26.1 (09-26-21 @ 17:26), 26.1 (09-25-21 @ 22:50), 31 (08-06-21 @ 06:45)    [ ]PPSV2 < or = to 30% [ ]significant weight loss  [ ]poor nutritional intake  [ ]anasarca      [ ]Artificial Nutrition      REFERRALS:   [ ]Chaplaincy  [ ]Hospice  [ ]Child Life  [ ]Social Work  [x ]Case management [ ]Holistic Therapy     Goals of Care Document:  HPI:  66M w/PMHx of HTN, GERD, and recently diagnosed pancreatic mass, s/p robot converted to open Whipple on 9/6 who was transferred from [facility] to [facility] due to worsening nausea and vomiting over the last 5 days. Pt endorses a worsening in appetite and has had decreased PO intake over the last week 2/2 to fear of vomiting. Pt notes emesis to be bilious w/o evidence of blood. Pt denies abdominal and chest pain, fevers, chills, SOB, dizziness. Patient was afebrile, hemodynamically stable on presentation. Labs significant for elevated LFTs (T.bili 7.2,  / , AlkPhos 267). CT AP at [facility] demonstrated extensive metastatic disease in the jude hepatis, celiac axis, and peritoneal cavity with likely liver metastases and severe intrahepatic biliary dilatation.  (26 Sep 2021 00:09)    PERTINENT PM/SXH:   No pertinent past medical history  Malignant tumor of pancreas  Hypertension  Chronic GERD  Lumbar herniated disc  Vertigo  DVT, lower extremity  History of hepatitis C  H/O ETOH abuse  Pancreatic tumor  Elevated LFTs  No significant past surgical history  History of biliary duct stent placement  History of Whipple procedure    FAMILY HISTORY:  Family history of heart murmur (Mother)    ITEMS NOT CHECKED ARE NOT PRESENT    SOCIAL HISTORY:   Significant other/partner[x- girlfriend Regina ]  Children[x- 1 child in his 30s who he has not been in communication for over 15 years [ ]  Zoroastrianism/Spirituality: Yazidism  Substance hx:  [ ]   Tobacco hx:  [x- former smoker ]   Alcohol hx: [x- hx of former alcohol abuse in ]   Home Opioid hx:  [ ] I-Stop Reference No:[id number]  Living Situation: [x ]Home  [ ]Long term care  [ ]Rehab [ ]Other      ADVANCE DIRECTIVES:    MOLST  [ ]  Living Will  [ ]   DECISION MAKER(s):  [ ] Health Care Proxy(s)  [ x] Surrogate(s)  [ ] Guardian           Name(s): Phone Number(s):  Regina Ellis : [phone]    BASELINE (I)ADL(s) (prior to admission):  [place]: [ x]Total  [ ] Moderate [ ]Dependent    Allergies    No Known Allergies    Intolerances    hydrALAZINE (Vomiting)  hydrALAZINE (Vomiting; Nausea)  MEDICATIONS  (STANDING):  calcitonin Injectable 290 International Unit(s) IntraMuscular every 12 hours  cloNIDine 0.1 milliGRAM(s) Oral two times a day  enoxaparin Injectable 40 milliGRAM(s) SubCutaneous daily  furosemide   Injectable 20 milliGRAM(s) IV Push once  piperacillin/tazobactam IVPB.. 3.375 Gram(s) IV Intermittent every 8 hours  potassium chloride  10 mEq/100 mL IVPB 10 milliEquivalent(s) IV Intermittent every 1 hour  potassium phosphate / sodium phosphate Tablet (K-PHOS No. 2) 2 Tablet(s) Oral four times a day with meals  sodium chloride 2 Gram(s) Oral every 8 hours  sodium chloride 0.9% Bolus 1000 milliLiter(s) IV Bolus once  sodium chloride 0.9%. 1000 milliLiter(s) (100 mL/Hr) IV Continuous <Continuous>    MEDICATIONS  (PRN):      PRESENT SYMPTOMS: [ ]Unable to obtain due to poor mentation   Source if other than patient:  [ ]Family   [ ]Team     Pain: [ ]yes [ x]no  QOL impact -   Location -                    Aggravating factors -  Quality -  Radiation -  Timing-  Severity (0-10 scale):  Minimal acceptable level (0-10 scale):     CPOT:    https://www.[facility].org/getattachment/[email or web address]/Critical-Care-Pain-Observation-Tool-(CPOT)      PAIN AD Score:     http://geriatrictoolkit.[place].[facility]/cog/painad.pdf (press ctrl +  left click to view)    Dyspnea:                           [ ]Mild [ ]Moderate [ ]Severe  Anxiety:                             [ ]Mild [ ]Moderate [ ]Severe  Agitation:                          [ ]Mild [ ]Moderate [ ]Severe  Fatigue:                             [ ]Mild [ ]Moderate [ ]Severe  Nausea:                             [ ]Mild [ ]Moderate [ ]Severe  Loss of appetite:              [ ]Mild [ ]Moderate [ ]Severe  Constipation:                   [ ]Mild [ ]Moderate [ ]Severe  Diarrhea:                          [ ]Mild [ ]Moderate [ ]Severe      Other Symptoms:  [x ]All other review of systems negative     Palliative Performance Status Version 2:    40-50     %    http://[email or web address].org/files/news/palliative_performance_scale_ppsv2.pdf    PHYSICAL EXAM:  Vital Signs Last 24 Hrs  T(C): 37.1 (07 Oct 2021 20:00), Max: 37.4 (07 Oct 2021 16:23)  T(F): 98.8 (07 Oct 2021 20:00), Max: 99.3 (07 Oct 2021 16:23)  HR: 105 (07 Oct 2021 21:00) (102 - 151)  BP: 122/59 (07 Oct 2021 21:00) (113/70 - 155/89)  BP(mean): 73 (07 Oct 2021 21:00) (73 - 99)  RR: 11 (07 Oct 2021 21:00) (11 - 27)  SpO2: 97% (07 Oct 2021 21:00) (90% - 100%)     I&O's Summary    06 Oct 2021 07:01  -  07 Oct 2021 07:00  --------------------------------------------------------  IN: 2090 mL / OUT: 2484 mL / NET: -394 mL    07 Oct 2021 07:01  -  07 Oct 2021 22:06  --------------------------------------------------------  IN: 900 mL / OUT: 500 mL / NET: 400 mL        GENERAL:  [x ]Alert  [ x]Oriented x 3  [ ]Lethargic  [ ]Cachexia  [ ]Unarousable  [ x]Verbal  [ ]Non-Verbal  [ x] No Distress  Behavioral:   [ ] Anxiety  [ ] Delirium [ ] Agitation [ x] Calm  [ ] Other  HEENT:  [ x]Normal  [ ] Temporal Wasting  [ ]Dry mouth   [ ]ET Tube/Trach  [ ]Oral lesions  [ ] Mucositis  PULMONARY:   [ ]Clear [ ]Tachypnea  [ ]Audible excessive secretions   [ ]Rhonchi        [ ]Right [ ]Left [ ]Bilateral  [ ]Crackles        [ ]Right [ ]Left [ ]Bilateral  [ ]Wheezing     [ ]Right [ ]Left [ ]Bilateral  [ x]Diminished breath sounds [ ]right [ ]left [ x]bilateral  CARDIOVASCULAR:    [ ]Regular [ ]Irregular [x ]Tachy  [ ][place] [ ]Murmur [ ]Other  GASTROINTESTINAL: Biliary drain patent with bilious output.  [x ]Soft  [ ]Distended   [ ]+BS  [x ]Non tender [ ]Tender  [ ]PEG [ ]OGT/ NGT  Last BM: 10/5  GENITOURINARY:  [x ]Normal [ ] Incontinent   [ ]Oliguria/Anuria   [ ][place]  MUSCULOSKELETAL:   [ ]Normal   [x ]Weakness  [ ]Bed/Wheelchair bound [ ]Edema  [  ] amputation  [  ] contraction  NEUROLOGIC:   [x ]No focal deficits  [ ]Cognitive impairment  [ ]Dysphagia [ ]Dysarthria [ ]Paresis [ ]Other   SKIN: Abdominal incision site See Nursing Skin Assessment for further details  [ ]Normal    [ ]Rash  [ ]Pressure ulcer(s)       Present on admission [ ]y [ ]n   [  ]  Wound    [  ] hyperpigmentation    CRITICAL CARE:  [ ] Shock Present  [ ]Septic [ ]Cardiogenic [ ]Neurologic [ ]Hypovolemic  [ ]  Vasopressors [ ]  Inotropes   [ ]Respiratory failure present [ ]Mechanical ventilation [ ]Non-invasive ventilatory support [ ]High flow    [ ]Acute  [ ]Chronic [ ]Hypoxic  [ ]Hypercarbic [ ]Other  [ ]Other organ failure     LABS:                        7.8    31.95 )-----------( 608      ( 07 Oct 2021 16:57 )             24.4   10-07    140  |  103  |  13  ----------------------------<  98  3.5   |  20<L>  |  0.71    Ca    14.3<HH>      07 Oct 2021 16:57  Phos  4.0     10-07  Mg     2.00     10-07    TPro  5.7<L>  /  Alb  2.6<L>  /  TBili  5.8<H>  /  DBili  x   /  AST  86<H>  /  ALT  54<H>  /  AlkPhos  111  10-07  PT/INR - ( 07 Oct 2021 16:57 )   PT: 15.5 sec;   INR: 1.38 ratio         PTT - ( 07 Oct 2021 16:57 )  PTT:23.7 sec    CAPILLARY BLOOD GLUCOSE    RADIOLOGY & ADDITIONAL STUDIES:  CT Abd/ Pelvis 10/7/2021  FINDINGS:  LOWER CHEST: Right pleural effusion is increased from 9/28/2021. New patchy right lower lobe opacities favoring infection.    LIVER:  *  New large hypodense necrotic mass/collection arising from the caudate lobe measuring approximately 6.7 x 6.3 x 5.8 cm (8:28 and 14:44), with mass effect and narrowing of the IVC, middle and left hepatic veins.  *  New necrotic mass/collection in segment 6 of the liver measuring approximately 7.8 x 6.4 x 8.3 cm (8:46 and 14:77).  *  Multiple additional hypodense liver masses, interval increase in size and number.    BILE DUCTS: Whipple with hepaticojejunostomy. Interval approach of the left hepatic percutaneous biliary drain with tip terminating in the small bowel.  GALLBLADDER: Cholecystectomy.  SPLEEN: Within normal limits.  PANCREAS: Whipple with pancreaticojejunostomy. Pancreatic stent.  ADRENALS: Within normal limits.  KIDNEYS/URETERS: Within normal limits.    BLADDER: Within normal limits.  REPRODUCTIVE ORGANS: Prostate within normal limits.    BOWEL/PERITONEUM/RETROPERITONEUM/LYMPH NODES: Whipple. Innumerable metastatic lymph node/mesenteric implants are increased in size from previous examination.  *  Confluent jude hepatis lymphadenopathy/mesenteric implants is indistinguishable from the bowel of the biliopancreatic limb and demonstrates mass effect on surrounding vessels. There is attenuation of the hepatic arterial vasculature, main portal vein, and portal confluence.  *  A necrotic mesenteric lymph node/implant measures 3.8 x 4.2 cm (8:65), previously measuring up to 2.2 cm on 9/25/2021.  *  An anterior abdominal implant/lymph node measures up to 4.5 x 4.6 cm (8:84) previously measuring up to 2.6 cm on 9/25/2021  *  New small abdominal ascites.    VESSELS: Atherosclerotic changes. Attenuated hepatic vasculature secondary to mass effect from surrounding disease, as above.    ABDOMINAL WALL: Postsurgical changes. Right lateral abdominal wall heterogeneous lesion of 4.3 cm, new.    BONES: Degenerative changes.    IMPRESSION:  New patchy right lower lobe opacities favoring infection. Increased right pleural effusion.    New large hypodense necrotic mass/collections arising from the caudate lobe and segment 6 of the liver. Caudate lobe mass demonstrates mass effect on the IVC and hepatic veins. These may represent metastatic disease or possibly abscesses.  Innumerable metastatic lymph node/mesenteric implants and hepatic masses are significantly increased in size from previous examination.    Confluent jude hepatis lymphadenopathy/mesenteric implants are indistinguishable from the bowel of the biliopancreatic limb and demonstrate mass effect on surrounding vessels. There is attenuation of the hepatic arterial vasculature, main portal vein, and portal confluence, which are otherwise patent.  A new heterogeneous mass in the right lateral abdominal wall may represent an additional focus of metastatic disease or a hematoma.  New small ascites.    XRAY Abdomen 10/7/2021  FINDINGS:  Nonobstructive bowel gas pattern. Surgical material overlies the abdomen.  Biliary drain unchanged in position.    IMPRESSION:  Nonobstructive bowel gas pattern.    CT Chest 9/28/2021    Few small lymph nodes are present in the right paratracheal space, pretracheal region, subcarinal space and AP window. Few small lymph nodes adjacent to the distal esophagus are unchanged when compared to prior CT scan of the abdomen of 9/25/2021. A 2 x 1 cm lymph node in the right cardiophrenic angle has increased in size when compared to prior CT scan of the abdomen dating back to 7/2/2021.    Heart is normal in size. No pericardial effusion is noted.    No endobronchial lesions are noted. A tracheal diverticulum is noted. No nodules are noted. Few linear opacities representing atelectasis/scarring are noted within both lungs. Very small pleural effusions are noted bilaterally.    For interpretation of the abdominal contents, please see the report of the CT scan of the abdomen performed on 9/25/2021.    Degenerative changes of the spine are noted.    IMPRESSION: No pulmonary nodules are noted.    2 cm lymph node in the right cardiophrenic angle has increased in size when compared to prior CT scan of the abdomen dating back to 7/2/2021. Etiology is unclear.      PROTEIN CALORIE MALNUTRITION PRESENT: [ ]mild [ ]moderate [ x]severe [ ]underweight [ ]morbid obesity  https://www.andeal.org/vault/2440/web/files/ONC/Table_Clinical%20Characteristics%20to%20Document%20Malnutrition-White%20JV%20et%20al%202012.pdf    Height (cm): 167.6 (09-26-21 @ 17:26), 167.6 (08-06-21 @ 06:45), 165.1 (07-28-21 @ 14:06)  Weight (kg): 73.3 (09-25-21 @ 22:50), 87.1 (08-06-21 @ 06:45), 88 (07-28-21 @ 14:06)  BMI (kg/m2): 26.1 (09-26-21 @ 17:26), 26.1 (09-25-21 @ 22:50), 31 (08-06-21 @ 06:45)    [ ]PPSV2 < or = to 30% [ ]significant weight loss  [ ]poor nutritional intake  [ ]anasarca      [ ]Artificial Nutrition      REFERRALS:   [ ]Chaplaincy  [ ]Hospice  [ ]Child Life  [ ]Social Work  [x ]Case management [ ]Holistic Therapy     ************************************************************************  PALLIATIVE MEDICINE COORDINATION OF CARE DOCUMENTATION  [x] Inpatient Consult  [ ] Other:  ************************************************************************    HPI:  66M w/PMHx of HTN, GERD, and recently diagnosed pancreatic mass, s/p robot converted to open Whipple on 9/6 who was transferred from [facility] to [facility] due to worsening nausea and vomiting over the last 5 days. Pt endorses a worsening in appetite and has had decreased PO intake over the last week 2/2 to fear of vomiting. Pt notes emesis to be bilious w/o evidence of blood. Pt denies abdominal and chest pain, fevers, chills, SOB, dizziness. Patient was afebrile, hemodynamically stable on presentation. Labs significant for elevated LFTs (T.bili 7.2,  / , AlkPhos 267). CT AP at [facility] demonstrated extensive metastatic disease in the jude hepatis, celiac axis, and peritoneal cavity with likely liver metastases and severe intrahepatic biliary dilatation.  (26 Sep 2021 00:09)      ------------------------------------------------------------------------    MEDICATION REVIEW:  --- Pls refer to current medicatons in the body of this note  ISTOP REFERENCE:[id number]  Others' Prescriptions  Patient Name: Nato Reed Date: 1955  Address: [address] [place] [address] [address] [place], [place] [address]Pxv: Male  Rx Written	Rx Dispensed	Drug	Quantity	Days Supply	Prescriber Name	Prescriber Shi #	Payment Method	Dispenser  08/13/2021	08/14/2021	oxycodone hcl 5 mg tablet	5	2	[facility]	[id number]	Medicare	[id number]  04/28/2021	04/28/2021	oxycodone-acetaminophen 5-325 mg tablet	18	3	Masood Cope	[id number]	Medicare	[id number]  --- PRN usage: The patient HAS NOT used PRN's in the last 24h.  ------------------------------------------------------------------------  COORDINATION OF CARE:  --- Palliative Care consulted for: goals of care  --- Patient assessed: 10/7/2021  --- Patient previously seen by Palliative Care service: NO  ADVANCE CARE PLANNING  --- Code status: FULL  --- MOLST reviewed in chart: NONE  --- Surrogate: Regina Ellis  --- [facility] document found in Alpha: NONE  --- HCP/ Living will/ Other advanced directives in Alpha: NONE  ------------------------------------------------------------------------  CARE PROVIDER DOCUMENTATION:  --- SW/CM notes:  --- PT recs:  --- Sp/Sw recs:   --- Nutrition recs:  PLAN OF CARE  --- Known admissions in past year: 3  --- Current admit date: 9/25/2021  --- LOS:12 days  --- LACE score: 13  --- Current dispo plan: TO BE DETERMINED  ------------------------------------------------------------------------  --- Chart reviewed: 30 Minutes [including time used to gather, review and transfer data to this note]    Prolonged services rendered, as part of this patient's care provided by Palliative Medicine, include: i.chart review for provider and ancillary service documentation, ii.pertinent diagnostics including laboratory and imaging studies,iii. medication review including PRN use, iv. admission history including previous palliative care encounters and GOC notes, v.advance care planning documents including HCP and MOLST forms in Alpha. Part of Palliative Medicine extended evaluation and management also involves coordination of care with our IDT, the primary and consulting bella, and unit CM/SW and Hospice if eligible. Recommendations based on the information gathered and discussed are outline in the AP of our notes.    ************************************************************************ HPI:  66M w/PMHx of HTN, GERD, and recently diagnosed pancreatic mass, s/p robot converted to open Whipple on 9/6 who was transferred from [facility] to [facility] due to worsening nausea and vomiting over the last 5 days. Pt endorses a worsening in appetite and has had decreased PO intake over the last week 2/2 to fear of vomiting. Pt notes emesis to be bilious w/o evidence of blood. Pt denies abdominal and chest pain, fevers, chills, SOB, dizziness. Patient was afebrile, hemodynamically stable on presentation. Labs significant for elevated LFTs (T.bili 7.2,  / , AlkPhos 267). CT AP at [facility] demonstrated extensive metastatic disease in the jude hepatis, celiac axis, and peritoneal cavity with likely liver metastases and severe intrahepatic biliary dilatation.  (26 Sep 2021 00:09)    PERTINENT PM/SXH:   No pertinent past medical history  Malignant tumor of pancreas  Hypertension  Chronic GERD  Lumbar herniated disc  Vertigo  DVT, lower extremity  History of hepatitis C  H/O ETOH abuse  Pancreatic tumor  Elevated LFTs  No significant past surgical history  History of biliary duct stent placement  History of Whipple procedure    FAMILY HISTORY:  Family history of heart murmur (Mother)    ITEMS NOT CHECKED ARE NOT PRESENT    SOCIAL HISTORY:   Significant other/partner[x- girlfriend Regina ]  Children[x- 1 child in his 30s who he has not been in communication for over 15 years [ ]  Judaism/Spirituality: Yarsanism  Substance hx:  [ ]   Tobacco hx:  [x- former smoker ]   Alcohol hx: [x- hx of former alcohol abuse in ]   Home Opioid hx:  [ ] I-Stop Reference No:[id number]  Living Situation: [x ]Home  [ ]Long term care  [ ]Rehab [ ]Other      ADVANCE DIRECTIVES:    MOLST  [ ]  Living Will  [ ]   DECISION MAKER(s):  [ ] Health Care Proxy(s)  [ x] Surrogate(s)  [ ] Guardian           Name(s): Phone Number(s):  Regina Ellis : [phone]    BASELINE (I)ADL(s) (prior to admission):  [place]: [ x]Total  [ ] Moderate [ ]Dependent    Allergies    No Known Allergies    Intolerances    hydrALAZINE (Vomiting)  hydrALAZINE (Vomiting; Nausea)  MEDICATIONS  (STANDING):  calcitonin Injectable 290 International Unit(s) IntraMuscular every 12 hours  cloNIDine 0.1 milliGRAM(s) Oral two times a day  enoxaparin Injectable 40 milliGRAM(s) SubCutaneous daily  furosemide   Injectable 20 milliGRAM(s) IV Push once  piperacillin/tazobactam IVPB.. 3.375 Gram(s) IV Intermittent every 8 hours  potassium chloride  10 mEq/100 mL IVPB 10 milliEquivalent(s) IV Intermittent every 1 hour  potassium phosphate / sodium phosphate Tablet (K-PHOS No. 2) 2 Tablet(s) Oral four times a day with meals  sodium chloride 2 Gram(s) Oral every 8 hours  sodium chloride 0.9% Bolus 1000 milliLiter(s) IV Bolus once  sodium chloride 0.9%. 1000 milliLiter(s) (100 mL/Hr) IV Continuous <Continuous>    MEDICATIONS  (PRN):      PRESENT SYMPTOMS: [ ]Unable to obtain due to poor mentation   Source if other than patient:  [ ]Family   [ ]Team     Pain: [ ]yes [ x]no  QOL impact -   Location -                    Aggravating factors -  Quality -  Radiation -  Timing-  Severity (0-10 scale):  Minimal acceptable level (0-10 scale):     CPOT:    https://www.[facility].org/getattachment/hsd86q48-7z1q-6a6k-0r2k-9873p1939s7f/Critical-Care-Pain-Observation-Tool-(CPOT)      PAIN AD Score:     http://geriatrictoolkit.[place].[facility]/cog/painad.pdf (press ctrl +  left click to view)    Dyspnea:                           [ ]Mild [ ]Moderate [ ]Severe  Anxiety:                             [ ]Mild [ ]Moderate [ ]Severe  Agitation:                          [ ]Mild [ ]Moderate [ ]Severe  Fatigue:                             [ ]Mild [ ]Moderate [ ]Severe  Nausea:                             [ ]Mild [ ]Moderate [ ]Severe  Loss of appetite:              [ ]Mild [ ]Moderate [ ]Severe  Constipation:                   [ ]Mild [ ]Moderate [ ]Severe  Diarrhea:                          [ ]Mild [ ]Moderate [ ]Severe      Other Symptoms:  [x ]All other review of systems negative     Palliative Performance Status Version 2:    40-50     %    http://[facility].org/files/news/palliative_performance_scale_ppsv2.pdf    PHYSICAL EXAM:  Vital Signs Last 24 Hrs  T(C): 37.1 (07 Oct 2021 20:00), Max: 37.4 (07 Oct 2021 16:23)  T(F): 98.8 (07 Oct 2021 20:00), Max: 99.3 (07 Oct 2021 16:23)  HR: 105 (07 Oct 2021 21:00) (102 - 151)  BP: 122/59 (07 Oct 2021 21:00) (113/70 - 155/89)  BP(mean): 73 (07 Oct 2021 21:00) (73 - 99)  RR: 11 (07 Oct 2021 21:00) (11 - 27)  SpO2: 97% (07 Oct 2021 21:00) (90% - 100%)     I&O's Summary    06 Oct 2021 07:01  -  07 Oct 2021 07:00  --------------------------------------------------------  IN: 2090 mL / OUT: 2484 mL / NET: -394 mL    07 Oct 2021 07:01  -  07 Oct 2021 22:06  --------------------------------------------------------  IN: 900 mL / OUT: 500 mL / NET: 400 mL        GENERAL:  [x ]Alert  [ x]Oriented x 3  [ ]Lethargic  [ ]Cachexia  [ ]Unarousable  [ x]Verbal  [ ]Non-Verbal  [ x] No Distress  Behavioral:   [ ] Anxiety  [ ] Delirium [ ] Agitation [ x] Calm  [ ] Other  HEENT:  [ x]Normal  [ ] Temporal Wasting  [ ]Dry mouth   [ ]ET Tube/Trach  [ ]Oral lesions  [ ] Mucositis  PULMONARY:   [ ]Clear [ ]Tachypnea  [ ]Audible excessive secretions   [ ]Rhonchi        [ ]Right [ ]Left [ ]Bilateral  [ ]Crackles        [ ]Right [ ]Left [ ]Bilateral  [ ]Wheezing     [ ]Right [ ]Left [ ]Bilateral  [ x]Diminished breath sounds [ ]right [ ]left [ x]bilateral  CARDIOVASCULAR:    [ ]Regular [ ]Irregular [x ]Tachy  [ ][place] [ ]Murmur [ ]Other  GASTROINTESTINAL: Biliary drain patent with bilious output.  [x ]Soft  [ ]Distended   [ ]+BS  [x ]Non tender [ ]Tender  [ ]PEG [ ]OGT/ NGT  Last BM: 10/5  GENITOURINARY:  [x ]Normal [ ] Incontinent   [ ]Oliguria/Anuria   [ ][place]  MUSCULOSKELETAL:   [ ]Normal   [x ]Weakness  [ ]Bed/Wheelchair bound [ ]Edema  [  ] amputation  [  ] contraction  NEUROLOGIC:   [x ]No focal deficits  [ ]Cognitive impairment  [ ]Dysphagia [ ]Dysarthria [ ]Paresis [ ]Other   SKIN: Abdominal incision site See Nursing Skin Assessment for further details  [ ]Normal    [ ]Rash  [ ]Pressure ulcer(s)       Present on admission [ ]y [ ]n   [  ]  Wound    [  ] hyperpigmentation    CRITICAL CARE:  [ ] Shock Present  [ ]Septic [ ]Cardiogenic [ ]Neurologic [ ]Hypovolemic  [ ]  Vasopressors [ ]  Inotropes   [ ]Respiratory failure present [ ]Mechanical ventilation [ ]Non-invasive ventilatory support [ ]High flow    [ ]Acute  [ ]Chronic [ ]Hypoxic  [ ]Hypercarbic [ ]Other  [ ]Other organ failure     LABS:                        7.8    31.95 )-----------( 608      ( 07 Oct 2021 16:57 )             24.4   10-07    140  |  103  |  13  ----------------------------<  98  3.5   |  20<L>  |  0.71    Ca    14.3<HH>      07 Oct 2021 16:57  Phos  4.0     10-07  Mg     2.00     10-07    TPro  5.7<L>  /  Alb  2.6<L>  /  TBili  5.8<H>  /  DBili  x   /  AST  86<H>  /  ALT  54<H>  /  AlkPhos  111  10-07  PT/INR - ( 07 Oct 2021 16:57 )   PT: 15.5 sec;   INR: 1.38 ratio         PTT - ( 07 Oct 2021 16:57 )  PTT:23.7 sec    CAPILLARY BLOOD GLUCOSE    RADIOLOGY & ADDITIONAL STUDIES:  CT Abd/ Pelvis 10/7/2021  FINDINGS:  LOWER CHEST: Right pleural effusion is increased from 9/28/2021. New patchy right lower lobe opacities favoring infection.    LIVER:  *  New large hypodense necrotic mass/collection arising from the caudate lobe measuring approximately 6.7 x 6.3 x 5.8 cm (8:28 and 14:44), with mass effect and narrowing of the IVC, middle and left hepatic veins.  *  New necrotic mass/collection in segment 6 of the liver measuring approximately 7.8 x 6.4 x 8.3 cm (8:46 and 14:77).  *  Multiple additional hypodense liver masses, interval increase in size and number.    BILE DUCTS: Whipple with hepaticojejunostomy. Interval approach of the left hepatic percutaneous biliary drain with tip terminating in the small bowel.  GALLBLADDER: Cholecystectomy.  SPLEEN: Within normal limits.  PANCREAS: Whipple with pancreaticojejunostomy. Pancreatic stent.  ADRENALS: Within normal limits.  KIDNEYS/URETERS: Within normal limits.    BLADDER: Within normal limits.  REPRODUCTIVE ORGANS: Prostate within normal limits.    BOWEL/PERITONEUM/RETROPERITONEUM/LYMPH NODES: Whipple. Innumerable metastatic lymph node/mesenteric implants are increased in size from previous examination.  *  Confluent jude hepatis lymphadenopathy/mesenteric implants is indistinguishable from the bowel of the biliopancreatic limb and demonstrates mass effect on surrounding vessels. There is attenuation of the hepatic arterial vasculature, main portal vein, and portal confluence.  *  A necrotic mesenteric lymph node/implant measures 3.8 x 4.2 cm (8:65), previously measuring up to 2.2 cm on 9/25/2021.  *  An anterior abdominal implant/lymph node measures up to 4.5 x 4.6 cm (8:84) previously measuring up to 2.6 cm on 9/25/2021  *  New small abdominal ascites.    VESSELS: Atherosclerotic changes. Attenuated hepatic vasculature secondary to mass effect from surrounding disease, as above.    ABDOMINAL WALL: Postsurgical changes. Right lateral abdominal wall heterogeneous lesion of 4.3 cm, new.    BONES: Degenerative changes.    IMPRESSION:  New patchy right lower lobe opacities favoring infection. Increased right pleural effusion.    New large hypodense necrotic mass/collections arising from the caudate lobe and segment 6 of the liver. Caudate lobe mass demonstrates mass effect on the IVC and hepatic veins. These may represent metastatic disease or possibly abscesses.  Innumerable metastatic lymph node/mesenteric implants and hepatic masses are significantly increased in size from previous examination.    Confluent jude hepatis lymphadenopathy/mesenteric implants are indistinguishable from the bowel of the biliopancreatic limb and demonstrate mass effect on surrounding vessels. There is attenuation of the hepatic arterial vasculature, main portal vein, and portal confluence, which are otherwise patent.  A new heterogeneous mass in the right lateral abdominal wall may represent an additional focus of metastatic disease or a hematoma.  New small ascites.    XRAY Abdomen 10/7/2021  FINDINGS:  Nonobstructive bowel gas pattern. Surgical material overlies the abdomen.  Biliary drain unchanged in position.    IMPRESSION:  Nonobstructive bowel gas pattern.    CT Chest 9/28/2021    Few small lymph nodes are present in the right paratracheal space, pretracheal region, subcarinal space and AP window. Few small lymph nodes adjacent to the distal esophagus are unchanged when compared to prior CT scan of the abdomen of 9/25/2021. A 2 x 1 cm lymph node in the right cardiophrenic angle has increased in size when compared to prior CT scan of the abdomen dating back to 7/2/2021.    Heart is normal in size. No pericardial effusion is noted.    No endobronchial lesions are noted. A tracheal diverticulum is noted. No nodules are noted. Few linear opacities representing atelectasis/scarring are noted within both lungs. Very small pleural effusions are noted bilaterally.    For interpretation of the abdominal contents, please see the report of the CT scan of the abdomen performed on 9/25/2021.    Degenerative changes of the spine are noted.    IMPRESSION: No pulmonary nodules are noted.    2 cm lymph node in the right cardiophrenic angle has increased in size when compared to prior CT scan of the abdomen dating back to 7/2/2021. Etiology is unclear.      PROTEIN CALORIE MALNUTRITION PRESENT: [ ]mild [ ]moderate [ x]severe [ ]underweight [ ]morbid obesity  https://www.andeal.org/vault/2440/web/files/ONC/Table_Clinical%20Characteristics%20to%20Document%20Malnutrition-White%20JV%20et%20al%202012.pdf    Height (cm): 167.6 (09-26-21 @ 17:26), 167.6 (08-06-21 @ 06:45), 165.1 (07-28-21 @ 14:06)  Weight (kg): 73.3 (09-25-21 @ 22:50), 87.1 (08-06-21 @ 06:45), 88 (07-28-21 @ 14:06)  BMI (kg/m2): 26.1 (09-26-21 @ 17:26), 26.1 (09-25-21 @ 22:50), 31 (08-06-21 @ 06:45)    [ ]PPSV2 < or = to 30% [ ]significant weight loss  [ ]poor nutritional intake  [ ]anasarca      [ ]Artificial Nutrition      REFERRALS:   [ ]Chaplaincy  [ ]Hospice  [ ]Child Life  [ ]Social Work  [x ]Case management [ ]Holistic Therapy     ************************************************************************  PALLIATIVE MEDICINE COORDINATION OF CARE DOCUMENTATION  [x] Inpatient Consult  [ ] Other:  ************************************************************************    HPI:  66M w/PMHx of HTN, GERD, and recently diagnosed pancreatic mass, s/p robot converted to open Whipple on 9/6 who was transferred from [facility] to [facility] due to worsening nausea and vomiting over the last 5 days. Pt endorses a worsening in appetite and has had decreased PO intake over the last week 2/2 to fear of vomiting. Pt notes emesis to be bilious w/o evidence of blood. Pt denies abdominal and chest pain, fevers, chills, SOB, dizziness. Patient was afebrile, hemodynamically stable on presentation. Labs significant for elevated LFTs (T.bili 7.2,  / , AlkPhos 267). CT AP at [facility] demonstrated extensive metastatic disease in the jude hepatis, celiac axis, and peritoneal cavity with likely liver metastases and severe intrahepatic biliary dilatation.  (26 Sep 2021 00:09)      ------------------------------------------------------------------------    MEDICATION REVIEW:  --- Pls refer to current medicatons in the body of this note  ISTOP REFERENCE:[id number]  Others' Prescriptions  Patient Name: Nato Reed Date: 1955  Address: [address] [place] [address] [address] [place], [place] [address]Byt: Male  Rx Written	Rx Dispensed	Drug	Quantity	Days Supply	Prescriber Name	Prescriber Shi #	Payment Method	Dispenser  08/13/2021	08/14/2021	oxycodone hcl 5 mg tablet	5	2	[facility]	[id number]	Medicare	[id number]  04/28/2021	04/28/2021	oxycodone-acetaminophen 5-325 mg tablet	18	3	Masood Cope	[id number]	Medicare	[id number]  --- PRN usage: The patient HAS NOT used PRN's in the last 24h.  ------------------------------------------------------------------------  COORDINATION OF CARE:  --- Palliative Care consulted for: goals of care  --- Patient assessed: 10/7/2021  --- Patient previously seen by Palliative Care service: NO  ADVANCE CARE PLANNING  --- Code status: FULL  --- MOLST reviewed in chart: NONE  --- Surrogate: Regina Ellis  --- [facility] document found in Alpha: NONE  --- HCP/ Living will/ Other advanced directives in Alpha: NONE  ------------------------------------------------------------------------  CARE PROVIDER DOCUMENTATION:  --- RULA/CELINE notes: Patient previously independent and living alone.   --- PT recs: Planned Therapy Interventions: balance training; gait training; strengthening; transfer training  --- Nutrition recs: Patient with Severe protein-calorie malnutrition  --- Nephrology recs: Consulted for Hypercalcemia suspecting it is either PTH or PTHrp mediated process. Recommending PTH, PTH related peptide, Vit D 1,25-OH and Vit D 25-OH, ionized calcium and continuing IVF and calcitonin.   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  Path confirms stage IV disease- no further workup needed from onc standpoint. No objection for discharge planning from onc- will update pt's oncologist in [place] and followup outpatient.  No plans for any inpatient systemic therapy.   --- Infectious Disease Rec: Following for E coli bacteremia 2/2 cholangitis , biliary obstruction, - recommending switch to Zosyn, repeat Blood Cx  PLAN OF CARE  --- Known admissions in past year: 3  --- Current admit date: 9/25/2021  --- LOS:12 days  --- LACE score: 13  --- Current dispo plan: TO BE DETERMINED  ------------------------------------------------------------------------  --- Chart reviewed: 30 Minutes [including time used to gather, review and transfer data to this note]    Prolonged services rendered, as part of this patient's care provided by Palliative Medicine, include: i.chart review for provider and ancillary service documentation, ii.pertinent diagnostics including laboratory and imaging studies,iii. medication review including PRN use, iv. admission history including previous palliative care encounters and C notes, v.advance care planning documents including HCP and MOLST forms in Alpha. Part of Palliative Medicine extended evaluation and management also involves coordination of care with our IDT, the primary and consulting blela, and unit CM/SW and Hospice if eligible. Recommendations based on the information gathered and discussed are outline in the AP of our notes.    ************************************************************************

## 2021-10-07 NOTE — CONSULT NOTE ADULT - PROBLEM SELECTOR RECOMMENDATION 6
- s/p perc biliary drain placement on 9/28  - ID recommendations appreciated - "Bacteremia has persisted despite appropriate abx therapy, raising concern for interval development of intra-abdominal abscess."  - management as per ID and primary team

## 2021-10-07 NOTE — CHART NOTE - NSCHARTNOTEFT_GEN_A_CORE
NUTRITION FOLLOW-UP: Pt seen for follow up care. Pt states he has no been able to eat or drink anything.     Weight:    Labs:    Current Diet:    Enteral Recommendations:    RD to Remain Available:    Additional Recommendations:   1)   2)   3) NUTRITION FOLLOW-UP: Pt seen for follow up care. Pt states he has no been able to eat or drink anything. Pt report trying to eat but every time he does he vomits.  Pt has lost 12.7 lb since admission.    Weight: 67.5 kg (10/7/21); admission weight was 73.3 kg (9/25/21)    Labs: K-3.2, glu-153, Ca-13.8, Alb-2.7, Bilirubin-5.3, AST-70    Current Diet: NPO    Edema: No edema noted    Skin- intact except for surgical incision    RD to Remain Available: Candice Felipe MS, RDN, CDN pager [phone]     Additional Recommendations:   1) Consider alternate means of nutrition

## 2021-10-07 NOTE — CONSULT NOTE ADULT - PROBLEM SELECTOR RECOMMENDATION 4
- due to cholangitis and e coli bacteremia  - ID recommendations appreciated  - management as per ID and primary team

## 2021-10-07 NOTE — PROGRESS NOTE ADULT - SUBJECTIVE AND OBJECTIVE BOX
66y old  Male who presents with a chief complaint of Nausea and Emesis (07 Oct 2021 14:43)      Interval history:  Afebrile, confused today, almost fell in hallway, denies any abdominal pain, no N/V, no urinary complains.       Allergies:   hydrALAZINE (Vomiting)  hydrALAZINE (Vomiting; Nausea)  No Known Allergies      Antimicrobials:  piperacillin/tazobactam IVPB.. 3.375 Gram(s) IV Intermittent every 8 hours      REVIEW OF SYSTEMS:  No chest pain   No diarrhea   No rash.       Vital Signs Last 24 Hrs  T(C): 36.6 (10-07-21 @ 11:04), Max: 36.6 (10-07-21 @ 11:04)  T(F): 97.9 (10-07-21 @ 11:04), Max: 97.9 (10-07-21 @ 11:04)  HR: 151 (10-07-21 @ 11:04) (102 - 151)  BP: 146/79 (10-07-21 @ 11:04) (115/72 - 155/89)  BP(mean): --  RR: 18 (10-07-21 @ 11:04) (18 - 18)  SpO2: 96% (10-07-21 @ 11:04) (90% - 96%)      PHYSICAL EXAM:  Patient in no acute distress. Alert, awake but somewhat confused.   Cardiovascular: S1S2 normal, tachy.   Lungs: + air entry B/L lung fields.  Gastrointestinal: soft, nontender, nondistended. + biliary drain.   Extremities: no edema.  IV sites not inflamed.                           8.5    26.50 )-----------( 600      ( 07 Oct 2021 12:10 )             25.4   10-07    139  |  103  |  13  ----------------------------<  153<H>  3.1<L>   |  26  |  0.65    Ca    13.8<HH>      07 Oct 2021 06:58  Phos  3.1     10-07  Mg     2.10     10-07    TPro  6.1  /  Alb  2.7<L>  /  TBili  5.3<H>  /  DBili  x   /  AST  70<H>  /  ALT  51<H>  /  AlkPhos  113  10-07      LIVER FUNCTIONS - ( 07 Oct 2021 06:58 )  Alb: 2.7 g/dL / Pro: 6.1 g/dL / ALK PHOS: 113 U/L / ALT: 51 U/L / AST: 70 U/L / GGT: x             Culture - Blood (10.01.21 @ 19:04)   Specimen Source: .Blood Blood   Culture Results:   No Growth Final     Radiology:    < from: Xray Abdomen 1 View PORTABLE -Urgent (Xray Abdomen 1 View PORTABLE -Urgent .) (10.07.21 @ 11:24) >    IMPRESSION:  Nonobstructive bowel gas pattern.

## 2021-10-07 NOTE — CHART NOTE - NSCHARTNOTEFT_GEN_A_CORE
: Luther Rivera M.D. / Delvis Rebolledo M.D.   [x] patient identity confirmed     INDICATION:     PROCEDURE:   [x] LIMITED CHEST  [x] LIMITED ECHO, TRANSTHORACIC  [ ] LIMITED ABDOMINAL  [ ] LIMITED RETROPERITONEAL  [ ] LIMITED DVT  [ ] OTHER    FINDINGS:     Lung:   Appropriate lung sliding at the visceral-parietal pleural interface bilaterally.   Dominant A-line pattern throughout bilateral anterior lung fields, minimal B-lines noted.   No free fluid visualized in the right/left pleural space.     Cardiac:   Minimal to moderate free fluid in the pericardial space visualized.   No left ventricular systolic dysfunction appreciated. Grossly normal EF by E-point septal separation and global function.   No right ventricular enlargement or collapse visualized.     Additional findings: none    INTERPRETATION:  Proper aeration throughout all lung fields.   No systolic/diastolic dysfunction. Pericardial effusion appreciated.       Performed by Luther Rivera M.D.  PGY-1, Anesthesiology  [id number] / [phone]

## 2021-10-07 NOTE — CONSULT NOTE ADULT - ASSESSMENT
SICU CONSULT NOTE    HPI  66 M with Hx HTN, GERD, and recently diagnosed pancreatic mass, s/p robot converted to open Whipple on 9/6/21 transferred from [facility] for elevated LFTs, nausea, and emesis, found to have extensive metastatic disease in post-surgical site, likely due to rapid recurrence, with c/f biliary obstruction; now s/p biliary drain placement 9/28 2/2 signs & symptoms cholangitis.      MARIA EUGENIA CLARKE is a 66y Male     PAST MEDICAL HISTORY: No pertinent past medical history    Malignant tumor of pancreas    Hypertension    Chronic GERD    Lumbar herniated disc    Vertigo    DVT, lower extremity    History of hepatitis C    H/O ETOH abuse    Pancreatic tumor    Elevated LFTs    PAST SURGICAL HISTORY: No significant past surgical history    History of biliary duct stent placement    History of Whipple procedure    FAMILY HISTORY: Family history of heart murmur (Mother)        SOCIAL HISTORY:    CODE STATUS:     HOME MEDICATIONS:    ALLERGIES: hydrALAZINE (Vomiting)  hydrALAZINE (Vomiting; Nausea)  No Known Allergies      VITAL SIGNS:  ICU Vital Signs Last 24 Hrs  T(C): 36.6 (07 Oct 2021 11:04), Max: 36.6 (07 Oct 2021 11:04)  T(F): 97.9 (07 Oct 2021 11:04), Max: 97.9 (07 Oct 2021 11:04)  HR: 151 (07 Oct 2021 11:04) (102 - 151)  BP: 146/79 (07 Oct 2021 11:04) (115/72 - 155/89)  BP(mean): --  ABP: --  ABP(mean): --  RR: 18 (07 Oct 2021 11:04) (18 - 18)  SpO2: 96% (07 Oct 2021 11:04) (90% - 96%)      NEURO  Exam:      RESPIRATORY  Mechanical Ventilation:   ABG - ( 07 Oct 2021 12:10 )  pH: x     /  pCO2: x     /  pO2: x     / HCO3: x     / Base Excess: x     /  SaO2: x       Lactate: 10.5             Exam:      CARDIOVASCULAR    Exam:  Cardiac Rhythm:  cloNIDine 0.1 milliGRAM(s) Oral two times a day      GI/NUTRITION  Exam:  Diet:      GENITOURINARY/RENAL  potassium chloride  10 mEq/100 mL IVPB 10 milliEquivalent(s) IV Intermittent every 1 hour  potassium phosphate / sodium phosphate Tablet (K-PHOS No. 2) 2 Tablet(s) Oral four times a day with meals  sodium chloride 2 Gram(s) Oral every 8 hours  sodium chloride 0.9% Bolus 500 milliLiter(s) IV Bolus once  sodium chloride 0.9%. 1000 milliLiter(s) IV Continuous <Continuous>      10-06 @ 07:01  -  10-07 @ 07:00  --------------------------------------------------------  IN:    dextrose 5% + sodium chloride 0.45%: 1275 mL    Instillation (mL): 5 mL    IV PiggyBack: 200 mL    Oral Fluid: 610 mL  Total IN: 2090 mL    OUT:    Drain (mL): 630 mL    Emesis (mL): 104 mL    Voided (mL): 1750 mL  Total OUT: 2484 mL    Total NET: -394 mL      10-07 @ 07:01  -  10-07 @ 14:46  --------------------------------------------------------  IN:  Total IN: 0 mL    OUT:    Drain (mL): 100 mL  Total OUT: 100 mL    Total NET: -100 mL          10-07    139  |  103  |  13  ----------------------------<  153<H>  3.1<L>   |  26  |  0.65    Ca    13.8<HH>      07 Oct 2021 06:58  Phos  3.1     10-07  Mg     2.10     10-07    TPro  6.1  /  Alb  2.7<L>  /  TBili  5.3<H>  /  DBili  x   /  AST  70<H>  /  ALT  51<H>  /  AlkPhos  113  10-07    [ ] [place] catheter, indication: urine output monitoring in critically ill patient    HEMATOLOGIC  [ ] VTE Prophylaxis:                          8.5    26.50 )-----------( 600      ( 07 Oct 2021 12:10 )             25.4       Transfusion: [ ] PRBC	[ ] Platelets	[ ] FFP	[ ] Cryoprecipitate      INFECTIOUS DISEASES  piperacillin/tazobactam IVPB.. 3.375 Gram(s) IV Intermittent every 8 hours    RECENT CULTURES:      ENDOCRINE    CAPILLARY BLOOD GLUCOSE      POCT Blood Glucose.: 124 mg/dL (07 Oct 2021 11:05)      PATIENT CARE ACCESS DEVICES:  [ ] Peripheral IV  [ ] Central Venous Line	[ ] R	[ ] L	[ ] IJ	[ ] Fem	[ ] SC	Placed:   [ ] Arterial Line		[ ] R	[ ] L	[ ] Fem	[ ] Rad	[ ] Ax	Placed:   [ ] PICC:					[ ] Mediport  [ ] Urinary Catheter, Date Placed:   [x] Necessity of urinary, arterial, and venous catheters discussed    OTHER MEDICATIONS:     IMAGING STUDIES: ASSESSMENT:  67 yo M PMHx of HTN, GERD, recently diagnosed pancreatic mass, now s/p robotic - converted to open Whipple on 9/6, then subsequently transferred to [facility] on 9/26 due to worsening N/V and elevated LFT's. Found to have extensive metastatic disease in the jude hepatis, celiac axis, and peritoneal cavity with likely liver metastases and severe intrahepatic biliary dilatation. Now s/p biliary drain placement on 9/28 to development of signs suggestive of cholangitis. Bcx and biliary cx + for ecoli 9/28. Pt stable untiil today (10/7) prior to IR biliary stent placement when he colapsed. FOund to be tachycardic w/ prolonged QTc. Labs significant for hypercalcemia to 13, potassium 3.1, and lactate of 10. SICU consulted for ongoing management of hypercalcemia and lactic acidosis in setting of sepsis, possible ascending cholangitis, and stage IV metastatic pancreatic CA.     PLAN:  NEURO: s/p collapse on 10/7 due to "weak legs", no LOC.   - presently A&O x 4  - no pain at this time  - monitor mental status    RESPIRATORY:   - no acute issues  - continuous pulse ox  - continue to monitor  - f/u cxr results    CARDIOVASCULAR:  - clonidine 0.1 mg BID (home med)  - QTc 633 following collapse  - f/u repeat ecg  - trend lactate (10>8)    GI/NUTRITION:  -NPO  -biliary drain to gravity  -hold antiemetic given QTc 633  -f/u ct abd/pelvis    GENITOURINARY/RENAL:  - hypercalcemia/hypokalemia following collapse  - calcitonin 4 mg/kg q4, per nephro  - 500 cc bolus ordered  - IVF NS @100 cc/hr   - 3 runs 10 meq K+ ordered  - monitor I/Os    HEMATOLOGIC:  -monitor H&H  -40 mg lovenox daily    INFECTIOUS DISEASE:  -s/p placement of biliary drain (9/28) w/ concern for ascending cholangitis  -BCx e.coli+ 9/28  -biliary cx e.coli+ 9/27  -continue zosyn  -trend wbc count    ENDOCRINE:  -monitor glucose  -calcitonin 4 mg/kg q4, per nephro    Lines:  biliary drain  PIV    DISPO: SICU
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
## 2021-10-07 NOTE — CONSULT NOTE ADULT - PROBLEM SELECTOR RECOMMENDATION 5
- s/p perc biliary drain placement on 9/28  - ID recommendations appreciated  - management as per ID and primary team
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
## 2021-10-07 NOTE — CONSULT NOTE ADULT - PROBLEM SELECTOR RECOMMENDATION 10
Emotional support provided, questions answered.  Chaplaincy offered to patient    Thank you for allowing us to participate in your patient's care. We will continue to follow with you. Please page [phone] for any questions/concerns. Emotional support provided, questions answered.  Chaplaincy offered to patient  Voicemail left for Regina ; awaiting callback    Thank you for allowing us to participate in your patient's care. We will continue to follow with you. Please page [phone] for any questions/concerns.

## 2021-10-07 NOTE — CONSULT NOTE ADULT - ASSESSMENT
65 yo M PMHx of HTN, GERD, recently diagnosed pancreatic mass, now s/p robotic - converted to open Whipple on 9/6, then subsequently transferred to [facility] on 9/26 due to worsening N/V and elevated LFT's. Here at [facility] pt found to have extensive metastatic disease in the jude hepatis, celiac axis, and peritoneal cavity with likely liver metastases and severe intrahepatic biliary dilatation. Pt now s/p biliary drain placement on 9/28 to development of signs suggestive of cholangitis. Pt was started on broad spectrum abx (Zosyn) at that time. Pt was progressing adequately and stable floors since biliary drain placement, however today (10/7) pt was on his way to IR for placement of biliary stent, when he suddenly collapsed. At that time pt was found to be tachycardic to 150s. ECG was obtained which which showed QTc 633. Labs showed pt hypercalcemic to 13, potassium 3.1, and lactate to 10. Abd XR negative for free air. SICU consulted for ongoing management of hypercalcemia and lactic acidosis in setting of sepsis, possible ascending cholangitis, and stage IV metastatic pancreatic CA.   Palliative Care consulted for complex decision making  related to goals of care discussions in the setting of advanced illness

## 2021-10-07 NOTE — CONSULT NOTE ADULT - PROBLEM SELECTOR RECOMMENDATION 9
History of pancreatic cancer with metastatic disease now with worsening hypercalcemia. Of note, calcium on admission 9.4 now 13.8 likely secondary to progression of cancer. Corrected calcium consistent with severe hypercalcemia, however currently asymptomatic with no evidence of bone pain or changes in mental status. Has been able to ambulate. Serum BUN and creatinine within normal limits, patient has made adequate urine output.     Please order PTH, PTH related peptide, Vit D 1,25-OH and Vit D 25-OH, ionized calcium for evaluation of hyperparathyroidism vs humoral hypercalcemia of malignancy. Continue IV fluids NS for now. History of pancreatic cancer with metastatic disease now with worsening hypercalcemia. Of note, calcium on admission 9.4 now 13.8 likely secondary to progression of cancer. Corrected calcium consistent with severe hypercalcemia, however currently asymptomatic with no evidence of bone pain or changes in mental status. Has been able to ambulate. Serum BUN and creatinine within normal limits, patient has made adequate urine output.     Please order PTH, PTH related peptide, Vit D 1,25-OH and Vit D 25-OH, ionized calcium for evaluation of hyperparathyroidism vs humoral hypercalcemia of malignancy. Continue IV fluids NS for now and trend BMP; may require calcitonin for rapid correction and long term bisphosphonate therapy. History of pancreatic cancer with metastatic disease now with worsening hypercalcemia. Of note, calcium on admission 9.4 now 13.8 likely secondary to progression of cancer. Corrected calcium consistent with severe hypercalcemia, however currently asymptomatic with no evidence of bone pain or changes in mental status. Has been able to ambulate. Serum BUN and creatinine within normal limits, patient has made adequate urine output. Liver biopsy completed 10/4 with plan for biliary stent placement with IR today.     Please order PTH, PTH related peptide, Vit D 1,25-OH and Vit D 25-OH, ionized calcium for evaluation of primary/secondary hyperparathyroidism vs paraneoplastic vs humoral hypercalcemia of malignancy.    Continue IV fluids NS for now and calcitonin 4 units/kg every 12 hours for two doses for correction given acute rise in calcium. May require long term bisphosphonate therapy.

## 2021-10-07 NOTE — CONSULT NOTE ADULT - PROBLEM SELECTOR RECOMMENDATION 7
Biliary stent placement planned for today - however patient collapsed during transport to IR   management as per primary and IR teams

## 2021-10-07 NOTE — CONSULT NOTE ADULT - PROBLEM SELECTOR RECOMMENDATION 9
A meeting to discuss advance care planning was held today with patient.   Discussed goals of care and advance directives including, but not limited to health care proxy, code status.  Decision regarding code status: FULL CODE  Please see GOC note.  >16 minutes spent on advanced care planning with patient Patient requires support with ADLs. Please assist with ADLs PRN.  Skin care as per protocol

## 2021-10-07 NOTE — CONSULT NOTE ADULT - SUBJECTIVE AND OBJECTIVE BOX
SICU CONSULT NOTE    HPI  MARIA EUGENIA CLARKE is a 66y Male   65 yo M PMHx of HTN, GERD, recently diagnosed pancreatic mass, now s/p robotic - converted to open Whipple on 9/6, then subsequently transferred to [facility] on 9/26 due to worsening N/V and elevated LFT's. Here at [facility] pt found to have extensive metastatic disease in the jude hepatis, celiac axis, and peritoneal cavity with likely liver metastases and severe intrahepatic biliary dilatation. Pt now s/p biliary drain placement on 9/28 to development of signs suggestive of cholangitis. Pt was started on broad spectrum abx (Zosyn) at that time. Pt was progressing adequately and stable floors since biliary drain placement, however today (10/7) pt was on his way to IR for placement of biliary stent, when he suddenly collapsed. At that time pt was found to be tachycardic to 150s. ECG was obtained which which showed QTc 633. Labs showed pt hypercalcemic to 13, potassium 3.1, and lactate to 10. Abd XR negative for free air. SICU consulted for ongoing management of hypercalcemia and lactic acidosis in setting of sepsis, possible ascending cholangitis, and stage IV metastatic pancreatic CA.   Review of chart shows increasing lactate to 10 today and calcium to 13. ECG was obtained after pt's event today and showed sinus tachycardia, however QTc prolonged to 633ms. Nephrology was consulted and they recommended continuing IV fluids NS for now and calcitonin 4 units/kg q12. Pt also received 500 cc IVF following this event. Pt was remained normotensive throughout his stay and had one fever to 100.4 on 9/27, but has otherwise been fever4 freew. He is presently being continued on Zosyn. Biliary drain cultures from 9/27 + for ecoli, 9/28 blood cultures also + for ecoli, however bcx cleared on 10/1 w/ NGTD. ID is presently following.    Pt evaluated at bedside. Resting in bed comfortably. Pt reports ongoing N/V, however is without significant pain. He denies fevers chills. Last BM was 2 days ago. Pt states urination decreased. Review of chart shows 160cc urine overnight.     PAST MEDICAL HISTORY: No pertinent past medical history    Malignant tumor of pancreas    Hypertension    Chronic GERD    Lumbar herniated disc    Vertigo    DVT, lower extremity    History of hepatitis C    H/O ETOH abuse    Pancreatic tumor    Elevated LFTs        PAST SURGICAL HISTORY: No significant past surgical history    History of biliary duct stent placement    History of Whipple procedure        FAMILY HISTORY: Family history of heart murmur (Mother)        SOCIAL HISTORY:    CODE STATUS:     HOME MEDICATIONS:    ALLERGIES: hydrALAZINE (Vomiting)  hydrALAZINE (Vomiting; Nausea)  No Known Allergies      VITAL SIGNS:  ICU Vital Signs Last 24 Hrs  T(C): 36.6 (07 Oct 2021 11:04), Max: 36.6 (07 Oct 2021 11:04)  T(F): 97.9 (07 Oct 2021 11:04), Max: 97.9 (07 Oct 2021 11:04)  HR: 151 (07 Oct 2021 11:04) (102 - 151)  BP: 146/79 (07 Oct 2021 11:04) (115/72 - 155/89)  BP(mean): --  ABP: --  ABP(mean): --  RR: 18 (07 Oct 2021 11:04) (18 - 18)  SpO2: 96% (07 Oct 2021 11:04) (90% - 96%)      NEURO  Exam:      RESPIRATORY  Mechanical Ventilation:   ABG - ( 07 Oct 2021 12:10 )  pH: x     /  pCO2: x     /  pO2: x     / HCO3: x     / Base Excess: x     /  SaO2: x       Lactate: 10.5             Exam:      CARDIOVASCULAR    Exam:  Cardiac Rhythm:  cloNIDine 0.1 milliGRAM(s) Oral two times a day      GI/NUTRITION  Exam:  Diet:      GENITOURINARY/RENAL  potassium chloride  10 mEq/100 mL IVPB 10 milliEquivalent(s) IV Intermittent every 1 hour  potassium phosphate / sodium phosphate Tablet (K-PHOS No. 2) 2 Tablet(s) Oral four times a day with meals  sodium chloride 2 Gram(s) Oral every 8 hours  sodium chloride 0.9% Bolus 500 milliLiter(s) IV Bolus once  sodium chloride 0.9%. 1000 milliLiter(s) IV Continuous <Continuous>      10-06 @ 07:01  -  10-07 @ 07:00  --------------------------------------------------------  IN:    dextrose 5% + sodium chloride 0.45%: 1275 mL    Instillation (mL): 5 mL    IV PiggyBack: 200 mL    Oral Fluid: 610 mL  Total IN: 2090 mL    OUT:    Drain (mL): 630 mL    Emesis (mL): 104 mL    Voided (mL): 1750 mL  Total OUT: 2484 mL    Total NET: -394 mL      10-07 @ 07:01  -  10-07 @ 15:55  --------------------------------------------------------  IN:  Total IN: 0 mL    OUT:    Drain (mL): 100 mL  Total OUT: 100 mL    Total NET: -100 mL          10-07    139  |  103  |  13  ----------------------------<  153<H>  3.1<L>   |  26  |  0.65    Ca    13.8<HH>      07 Oct 2021 06:58  Phos  3.1     10-07  Mg     2.10     10-07    TPro  6.1  /  Alb  2.7<L>  /  TBili  5.3<H>  /  DBili  x   /  AST  70<H>  /  ALT  51<H>  /  AlkPhos  113  10-07    [ ] [place] catheter, indication: urine output monitoring in critically ill patient    HEMATOLOGIC  [ ] VTE Prophylaxis:  enoxaparin Injectable 40 milliGRAM(s) SubCutaneous daily                          8.5    26.50 )-----------( 600      ( 07 Oct 2021 12:10 )             25.4       Transfusion: [ ] PRBC	[ ] Platelets	[ ] FFP	[ ] Cryoprecipitate      INFECTIOUS DISEASES  piperacillin/tazobactam IVPB.. 3.375 Gram(s) IV Intermittent every 8 hours    RECENT CULTURES:      ENDOCRINE  calcitonin Injectable 290 International Unit(s) IntraMuscular every 12 hours    CAPILLARY BLOOD GLUCOSE      POCT Blood Glucose.: 124 mg/dL (07 Oct 2021 11:05)      PATIENT CARE ACCESS DEVICES:  [ ] Peripheral IV  [ ] Central Venous Line	[ ] R	[ ] L	[ ] IJ	[ ] Fem	[ ] SC	Placed:   [ ] Arterial Line		[ ] R	[ ] L	[ ] Fem	[ ] Rad	[ ] Ax	Placed:   [ ] PICC:					[ ] Mediport  [ ] Urinary Catheter, Date Placed:   [x] Necessity of urinary, arterial, and venous catheters discussed    OTHER MEDICATIONS:     IMAGING STUDIES:

## 2021-10-07 NOTE — CONSULT NOTE ADULT - ATTENDING COMMENTS
Patient s/p whipple found to have extensive metastatic disease. Patient recovering on floor with worsening cholangitis and electrolyte derangement and severe hypercalcemia.  N mentating, pain controlled, multimodal pain therapy  resp on room air  cv hemodynamically stable, hypoperfusion likely 2/2 to sepsis from intraabdominal source likely intravascular volume depletion, administered 1l bolus  gi npo, ir drain  gu/renal monitor uop, hypercalcemia, lasix, fluid, calcitonin  heme vte ppx  id on abx, maintain  endo no changes    The patient is a critical care patient with life threatening hemodynamic and metabolic instability in SICU.  I have personally interviewed when possible and examined the patient, reviewed data and laboratory tests/x-rays and all pertinent electronic images.  I was physically present for the key portions of the evaluation and management (E/M) service provided.   The SICU team has a constant risk benefit analyzes discussion with the primary team, all consultants, House Staff and PA's on all decisions.  These diagnoses are unrelated to the surgical procedure noted above.  I meet with family if needed to get further history, discuss the case and make care decisions for this patient who might not be able to participate.  Time involved in performance of separately billable procedures was not counted toward my critical care time. There is no overlap.  I spent 55-75 minutes ( 0800Hrs-0915Hrs in AM/ 1600Hrs-1715Hrs in PM, or other time indicated) of critical care time for the diagnoses, assessment, plan and interventions.  This time excludes time spent on separate procedures and teaching.
Seen/discussed with fellow  Pertinent labs and imaging reviewed personally  Agree with above
66y Male status post whipple presents biliary obstruction and fever concern for cholangitis. Discussed with GI, unable to perform ERCP due to post surgical anatomy. Plan for biliary drainage catheter placement today.  - Please place order for IR Procedure, approving attending Dr. Martínez   - maintain NPO status, confirmed NPO since 9/27.   - hold therapeutic and prophylactic anticoagulants  - maintain active type and screen x 2  - please draw stat Rapid COVID test (Abbot)
65 y/o M PMHx etOH abuse, HCV (s/p treatment), and recently diagnosed pancreatic CA (s/p open Whipple on 9/6/21), initially presented with N/V, found to have biliary obstruction 2/2 malignancy. Developed cholangitis and e.coli bacteremia, now s/p perc biliary drain placement on 9/28.     #E. Coli Bacteremia 2/2 Cholangitis, Biliary Obstruction - pancreatic CA (s/p Whipple last month) now with POD and acute biliary obstruction c/b cholangitis and pan-sensitive e. coli bacteremia, s/p perc biliary drain. Bacteremia has persisted despite appropriate abx therapy, raising concern for interval development of intra-abdominal abscess. No worsening back pain to suggest vertebral focus of infection.    Overall E. Coli Bacteremia, Cholangitis, Biliary Obstruction, transaminitis.       Recs:  - d/c Zosyn  - start Unasyn 3g q6h  - recommend CT A/P w/ IV contrast to evaluate for abscess  - repeat blood cultures in lab.       Plan discussed with consulting team.       Ilan Al  Pager: [phone]. If no response or past 5 pm call [phone].
65 y/o M with recently diagnosed pancreatic mass s/p Whipple surgery on 8/6/21 with Dr. Johnson p/w worsening n/v. Imaging studies reviewed, and c/w disease progression and new metastatic lesions. Of note, patient declined neoadjuvant chemotherapy prior to surgery, and had started discussions regarding adjuvant chemotherapy, FOLFIRINOX vs. gemcitabine/capecitabine (Dr. Boyd in [place]) when symptoms worsened.   Agree with GI evaluation for severe intrahepatic biliary obstruction and possible interventions. Recommend IR-guided biopsy of liver lesion or another metastatic site of disease to confirm metastatic disease. Once stabilized for discharge, patient can f/u with medical oncology for initiation of adjuvant chemotherapy. Will continue to follow with you.
Assessment/Plan:   66 year old male with pancreatic cancer status post whipple presents with biliary obstruction status post internal/external biliary drainage catheter placement.   - would recommend holding off biopsy until bacteremia has cleared.   - can plan for biopsy on Monday vs Tuesday   - Please obtain COVID swab on Sunday as this would be valid for both dates  - will follow up to determine time of procedure.
Suspect that this is either a PTH or PTHrp mediated process given the longer standing hypophosphatemia.  The calcium elevation may have been unmasked by his hydration status.  Recommendations as above.

## 2021-10-07 NOTE — PROGRESS NOTE ADULT - SUBJECTIVE AND OBJECTIVE BOX
SURGERY PROGRESS NOTE    Subjective:     Vital Signs:  Vital Signs Last 24 Hrs  T(C): 36.3 (06 Oct 2021 23:57), Max: 36.6 (06 Oct 2021 08:54)  T(F): 97.4 (06 Oct 2021 23:57), Max: 97.8 (06 Oct 2021 08:54)  HR: 105 (06 Oct 2021 23:57) (99 - 119)  BP: 155/89 (06 Oct 2021 23:57) (115/72 - 155/89)  BP(mean): --  RR: 18 (06 Oct 2021 23:57) (17 - 18)  SpO2: 90% (06 Oct 2021 23:57) (90% - 94%)    Physical Exam:  General:   Lungs:   CV:   Abdomen:  Extremities:          SURGERY PROGRESS NOTE    Subjective: Patient had one episode of emesis ~50 cc, gastric contents, around 10PM on 10/6.    Denies chest pain, SOB at this time.    Vital Signs:  Vital Signs Last 24 Hrs  T(C): 36.3 (06 Oct 2021 23:57), Max: 36.6 (06 Oct 2021 08:54)  T(F): 97.4 (06 Oct 2021 23:57), Max: 97.8 (06 Oct 2021 08:54)  HR: 105 (06 Oct 2021 23:57) (99 - 119)  BP: 155/89 (06 Oct 2021 23:57) (115/72 - 155/89)  BP(mean): --  RR: 18 (06 Oct 2021 23:57) (17 - 18)  SpO2: 90% (06 Oct 2021 23:57) (90% - 94%)    PHYSICAL EXAM:  Constitutional: A&Ox3, NAD  Respiratory: Unlabored breathing  Abdomen: Soft, nondistended, NTTP. No rebound or guarding. Well healed midline scar. Biliary drain patent with bilious output.  Extremities: WWP, RICHTER spontaneously    LABS:                         8.5    26.50 )-----------( 600      ( 07 Oct 2021 12:10 )             25.4     10-07    139  |  103  |  13  ----------------------------<  153<H>  3.1<L>   |  26  |  0.65    Ca    13.8<HH>      07 Oct 2021 06:58  Phos  3.1     10-07  Mg     2.10     10-07    TPro  6.1  /  Alb  2.7<L>  /  TBili  5.3<H>  /  DBili  x   /  AST  70<H>  /  ALT  51<H>  /  AlkPhos  113  10-07      RADIOLOGY, EKG & ADDITIONAL TESTS: Reviewed.      SURGERY PROGRESS NOTE    Subjective: Patient had one episode of emesis ~50 cc, gastric contents, around 10PM on 10/6.     Collapsed while being transported to  for biliary stent placement on 10/7; tachycardic to 150's, in rigors, but afebrile.     Vital Signs:  Vital Signs Last 24 Hrs  T(C): 36.3 (06 Oct 2021 23:57), Max: 36.6 (06 Oct 2021 08:54)  T(F): 97.4 (06 Oct 2021 23:57), Max: 97.8 (06 Oct 2021 08:54)  HR: 105 (06 Oct 2021 23:57) (99 - 119)  BP: 155/89 (06 Oct 2021 23:57) (115/72 - 155/89)  BP(mean): --  RR: 18 (06 Oct 2021 23:57) (17 - 18)  SpO2: 90% (06 Oct 2021 23:57) (90% - 94%)    PHYSICAL EXAM:  Constitutional: A&Ox3, NAD  Respiratory: Unlabored breathing  Abdomen: Soft, nondistended, NTTP. No rebound or guarding. Well healed midline scar. Biliary drain patent with bilious output.  Extremities: WWP, RICHTER spontaneously    LABS:                         8.5    26.50 )-----------( 600      ( 07 Oct 2021 12:10 )             25.4     10-07    139  |  103  |  13  ----------------------------<  153<H>  3.1<L>   |  26  |  0.65    Ca    13.8<HH>      07 Oct 2021 06:58  Phos  3.1     10-07  Mg     2.10     10-07    TPro  6.1  /  Alb  2.7<L>  /  TBili  5.3<H>  /  DBili  x   /  AST  70<H>  /  ALT  51<H>  /  AlkPhos  113  10-07      RADIOLOGY, EKG & ADDITIONAL TESTS: Reviewed.

## 2021-10-08 NOTE — PROGRESS NOTE ADULT - ATTENDING COMMENTS
Spent ~ 45 minutes with patient and his HCP, Regina, explaining his current clinical status and aggressive nature of his disease. His tumor has doubled (in some cases) over the last two CT scans.  He appears to be suffering a severe tumor lysis syndrome with high calcium, lactate. He is not a surgical candidate given the diffuse disease and after speaking to his med onc, he is not going to be a chemotherapy candidate either. I explained this in detail to him and his HCP.  They expressed understanding.  I also spoke to him about DNR/DNI.  At this point, he is not ready to commit to DNR/DNI and wants to continue to employ all life sustaining treatments.  We will continue to revisit this daily given his extremely poor prognosis.

## 2021-10-08 NOTE — CHART NOTE - NSCHARTNOTEFT_GEN_A_CORE
Source: Patient [x ]    Family [ ]     other [x ] RN, chart review     Nutrition f/u for severe protein calorie malnutrition.     66M w/PMHx of HTN, GERD, and recently diagnosed pancreatic mass, s/p robot converted to open Whipple on 9/6 who was transferred from [facility] to [facility] due to worsening nausea and vomiting over the last 5 days. Pt endorses a worsening in appetite and has had decreased PO intake over the last week 2/2 to fear of vomiting. Pt notes emesis to be bilious w/o evidence of blood. Pt denies abdominal and chest pain, fevers, chills, SOB, dizziness. Patient was afebrile, hemodynamically stable on presentation. Labs significant for elevated LFTs (T.bili 7.2,  / , AlkPhos 267). CT AP at [facility] demonstrated extensive metastatic disease in the jude hepatis, celiac axis, and peritoneal cavity with likely liver metastases and severe intrahepatic biliary dilatation.     Per chart, pt with poor prognosis due to stage IV pancreatic ca  -NGT placed last night for decompression with 600 mL output. Per chart, plan to keep NPO with IVF.  -Per nursing flow sheets, pt with 0% intake of most meals when he was ordered for PO diet.       Current Diet : NPO   Reported:  [ ] nausea  [ ] vomiting [ ] diarrhea [x ] constipation  [ ]chewing problems [ ] swallowing issues  [ ] other:   10/8 71.9 kg  Current Weight: Weight (kg): 73.3 (09-25)    Edema: none  Pressure Injuries: none    __________________ Pertinent Medications__________________   MEDICATIONS  (STANDING):  calcitonin Injectable 290 International Unit(s) IntraMuscular every 12 hours  cloNIDine 0.1 milliGRAM(s) Oral two times a day  enoxaparin Injectable 40 milliGRAM(s) SubCutaneous daily  multiple electrolytes Injection Type 1 1000 milliLiter(s) (150 mL/Hr) IV Continuous <Continuous>  piperacillin/tazobactam IVPB.. 3.375 Gram(s) IV Intermittent every 8 hours    MEDICATIONS  (PRN):      __________________ Pertinent Labs__________________   10-08 Na145 mmol/L Glu 110 mg/dL<H> K+ 3.3 mmol/L<L> Cr  0.70 mg/dL BUN 14 mg/dL 10-08 Phos 2.8 mg/dL 10-08 Alb 2.4 g/dL<L>      Estimated Needs:   [ x] no change since previous assessment using ABW 73.3 kg   25-30 sigrid/kg HRN=[id number] sigrid/d  1.2-1.4 gm pro/kg ABW= gm pro/d      Previous Nutrition Diagnosis: severe protein calorie malnutrition     Nutrition Diagnosis is [x ] ongoing  [ ] resolved [ ] not applicable       Recommendations:  1. If NPO >4 days, consider alternate means of nutrition as medically appropriate.   2. Bowel regimen PRN.         Monitoring and Evaluation:      [x ] weights [x ] follow up as able  [ ] other:

## 2021-10-08 NOTE — PROGRESS NOTE ADULT - SUBJECTIVE AND OBJECTIVE BOX
SICU Daily Progress Note  =====================================================  24 Hour Interval/Overnight Events:      - No improvement in Ca s/p IVF, Lasix 20 x1  - NGT confirmed with CXR   - Zofran x 1 for nausea  - HCP form signed, in chart  - Clonidine held o/n, Metop 5 x1 given      66M w/PMHx of HTN, GERD, and recently diagnosed pancreatic mass, s/p robot converted to open Whipple on 9/6 who was transferred from [facility] to [facility] due to worsening nausea and vomiting over the last 5 days. Pt endorses a worsening in appetite and has had decreased PO intake over the last week 2/2 to fear of vomiting. Pt notes emesis to be bilious w/o evidence of blood. Pt denies abdominal and chest pain, fevers, chills, SOB, dizziness. Patient was afebrile, hemodynamically stable on presentation. Labs significant for elevated LFTs (T.bili 7.2,  / , AlkPhos 267). CT AP at [facility] demonstrated extensive metastatic disease in the jude hepatis, celiac axis, and peritoneal cavity with likely liver metastases and severe intrahepatic biliary dilatation.       --------------------------------------------------------------------------------------  Allergies: hydrALAZINE (Vomiting)  hydrALAZINE (Vomiting; Nausea)  No Known Allergies      MEDICATIONS:   --------------------------------------------------------------------------------------  Neurologic Medications    Respiratory Medications    Cardiovascular Medications  cloNIDine 0.1 milliGRAM(s) Oral two times a day    Gastrointestinal Medications  potassium chloride  10 mEq/100 mL IVPB 10 milliEquivalent(s) IV Intermittent every 1 hour  potassium phosphate / sodium phosphate Tablet (K-PHOS No. 2) 2 Tablet(s) Oral four times a day with meals  sodium chloride 2 Gram(s) Oral every 8 hours  sodium chloride 0.9%. 1000 milliLiter(s) IV Continuous <Continuous>    Genitourinary Medications    Hematologic/Oncologic Medications  enoxaparin Injectable 40 milliGRAM(s) SubCutaneous daily    Antimicrobial/Immunologic Medications  piperacillin/tazobactam IVPB.. 3.375 Gram(s) IV Intermittent every 8 hours    Endocrine/Metabolic Medications  calcitonin Injectable 290 International Unit(s) IntraMuscular every 12 hours    Topical/Other Medications    --------------------------------------------------------------------------------------    VITAL SIGNS, INS/OUTS (last 24 hours):  --------------------------------------------------------------------------------------  T(C): 36.7 (10-08-21 @ 00:00), Max: 37.4 (10-07-21 @ 16:23)  HR: 104 (10-08-21 @ 00:00) (102 - 151)  BP: 135/75 (10-08-21 @ 00:00) (113/70 - 146/79)  BP(mean): 90 (10-08-21 @ 00:00) (73 - 99)  ABP: --  ABP(mean): --  RR: 24 (10-08-21 @ 00:00) (11 - 27)  SpO2: 97% (10-08-21 @ 00:00) (91% - 100%)  Wt(kg): --  CVP(mm Hg): --  CI: --  CAPILLARY BLOOD GLUCOSE      POCT Blood Glucose.: 124 mg/dL (07 Oct 2021 11:05)   N/A      10-06 @ 07:01  -  10-07 @ 07:00  --------------------------------------------------------  IN:    dextrose 5% + sodium chloride 0.45%: 1275 mL    Instillation (mL): 5 mL    IV PiggyBack: 200 mL    Oral Fluid: 610 mL  Total IN: 2090 mL    OUT:    Drain (mL): 630 mL    Emesis (mL): 104 mL    Voided (mL): 1750 mL  Total OUT: 2484 mL    Total NET: -394 mL      10-07 @ 07:01  -  10-08 @ 01:54  --------------------------------------------------------  IN:    sodium chloride 0.9%: 900 mL    Sodium Chloride 0.9% Bolus: 1500 mL  Total IN: 2400 mL    OUT:    Drain (mL): 220 mL    Voided (mL): 1250 mL  Total OUT: 1470 mL    Total NET: 930 mL        --------------------------------------------------------------------------------------    EXAM    NEUROLOGY  Exam: Normal, NAD, alert, oriented x3, no focal deficits.    HEENT  Exam: Normocephalic, atraumatic, EOMI.     RESPIRATORY  Exam: Lungs clear to auscultation, Normal expansion/effort.    CARDIOVASCULAR  Exam: S1, S2.  Regular rate and rhythm.      GI/NUTRITION  Exam: Soft, nontender, nondistended. Well healed midline scar. Biliary drain patent with bilious output. NGT clear output   Current Diet: NPO    METABOLIC/FLUIDS/ELECTROLYTES  potassium chloride  10 mEq/100 mL IVPB 10 milliEquivalent(s) IV Intermittent every 1 hour  potassium phosphate / sodium phosphate Tablet (K-PHOS No. 2) 2 Tablet(s) Oral four times a day with meals  sodium chloride 2 Gram(s) Oral every 8 hours  sodium chloride 0.9%. 1000 milliLiter(s) IV Continuous <Continuous>      HEMATOLOGIC  [x] VTE Prophylaxis: enoxaparin Injectable 40 milliGRAM(s) SubCutaneous daily      LABS  --------------------------------------------------------------------------------------  CBC (10-08 @ 00:56)                          8.1<L>                   30.24<H>  )--------------(  596<H>     --    % Neuts, --    % Lymphs, ANC: --                              24.9<L>  CBC (10-07 @ 16:57)                          7.8<L>                   31.95<H>  )--------------(  608<H>     94.7<H>% Neuts, 0.9<L>% Lymphs, ANC: 30.26<H>                          24.4<L>    BMP (10-08 @ 00:56)       140     |  103     |  13    			Ca++ 1.87<H>  Ca 13.5<HH>       ---------------------------------( 116<H>		Mg 1.90         3.4<L>  |  19<L>   |  0.72  			Ph 3.7     BMP (10-07 @ 16:57)       140     |  103     |  13    			Ca++ 2.13<H>  Ca 14.3<HH>       ---------------------------------( 98    		Mg 2.00         3.5     |  20<L>   |  0.71  			Ph 4.0       LFTs (10-07 @ 16:57)      TPro 5.7<L> / Alb 2.6<L> / TBili 5.8<H> / DBili -- / AST 86<H> / ALT 54<H> / AlkPhos 111  LFTs (10-07 @ 06:58)      TPro 6.1 / Alb 2.7<L> / TBili 5.3<H> / DBili -- / AST 70<H> / ALT 51<H> / AlkPhos 113    Coags (10-07 @ 16:57)  aPTT 23.7<L> / INR 1.38<H> / PT 15.5<H>      ABG (10-07 @ 16:57)     7.41 / 32<L> / 92 / 20<L> / -3.8<L> / 98.0%     Lactate:    ABG (10-07 @ 12:10)      /  /  /  /  / %     Lactate:  10.5<HH>        -> .Blood Blood Culture (10-01 @ 19:04)     NG    NG    No Growth Final    -> .Blood Blood Culture (10-01 @ 18:55)     NG    NG    No Growth Final    -> .Blood Blood Culture (09-29 @ 18:30)       Growth in aerobic bottle: Gram Negative Rods    Escherichia coli    Growth in aerobic bottle: Escherichia coli    -> .Blood Blood Culture (09-29 @ 17:57)       Growth in aerobic bottle: Gram Negative Rods    NG    Growth in aerobic bottle: Escherichia coli  See previous culture [id number]    -> .Body Fluid DRAINAGE BILLARY Culture (09-29 @ 11:01)     NG    NG    No growth    -> .Body Fluid DRAINAGE BILLARY Culture (09-28 @ 19:25)       No polymorphonuclear leukocytes seen per low power field  Rare Gram Negative Rods seen per oil power field    Escherichia coli  Klebsiella variicola    Few Escherichia coli  Rare Klebsiella variicola      --------------------------------------------------------------------------------------

## 2021-10-08 NOTE — PROGRESS NOTE ADULT - SUBJECTIVE AND OBJECTIVE BOX
SURGERY PROGRESS NOTE    Subjective:     Vital Signs:  Vital Signs Last 24 Hrs  T(C): 36.7 (08 Oct 2021 00:00), Max: 37.4 (07 Oct 2021 16:23)  T(F): 98.1 (08 Oct 2021 00:00), Max: 99.3 (07 Oct 2021 16:23)  HR: 104 (08 Oct 2021 00:00) (102 - 151)  BP: 135/75 (08 Oct 2021 00:00) (113/70 - 146/79)  BP(mean): 90 (08 Oct 2021 00:00) (73 - 99)  RR: 24 (08 Oct 2021 00:00) (11 - 27)  SpO2: 97% (08 Oct 2021 00:00) (91% - 100%)    Physical Exam:  General:   Lungs:   CV:   Abdomen:  Extremities:          SURGERY PROGRESS NOTE    Subjective: Patient not complaining of pain at this time. Comfortable overnight. He spoke with palliative care, does not wish to be DNR/DNI at this time, but made his friend Regina his healthcare proxy.    24 Hour Interval/Overnight Events:      - No improvement in Ca s/p IVF, Lasix 20 x1  - NGT confirmed with CXR   - Zofran x 1 for nausea  - HCP form signed, in chart  - Clonidine held o/n, Metop 5 x1 given    Vital Signs:  Vital Signs Last 24 Hrs  T(C): 36.7 (08 Oct 2021 00:00), Max: 37.4 (07 Oct 2021 16:23)  T(F): 98.1 (08 Oct 2021 00:00), Max: 99.3 (07 Oct 2021 16:23)  HR: 104 (08 Oct 2021 00:00) (102 - 151)  BP: 135/75 (08 Oct 2021 00:00) (113/70 - 146/79)  BP(mean): 90 (08 Oct 2021 00:00) (73 - 99)  RR: 24 (08 Oct 2021 00:00) (11 - 27)  SpO2: 97% (08 Oct 2021 00:00) (91% - 100%)    PHYSICAL EXAM:  Constitutional: A&Ox3, NAD  Respiratory: Unlabored breathing  Abdomen: Soft, nondistended, NTTP. No rebound or guarding. Well healed midline scar. Biliary drain patent with bilious output.  Extremities: WWP, RICHTER spontaneously    LABS:                         8.1    30.24 )-----------( 596      ( 08 Oct 2021 00:56 )             24.9     10-08    140  |  103  |  13  ----------------------------<  116<H>  3.4<L>   |  19<L>  |  0.72    Ca    13.5<HH>      08 Oct 2021 00:56  Phos  3.7     10-08  Mg     1.90     10-08    TPro  5.7<L>  /  Alb  2.6<L>  /  TBili  5.8<H>  /  DBili  x   /  AST  86<H>  /  ALT  54<H>  /  AlkPhos  111  10-07    PT/INR - ( 07 Oct 2021 16:57 )   PT: 15.5 sec;   INR: 1.38 ratio         PTT - ( 07 Oct 2021 16:57 )  PTT:23.7 sec    RADIOLOGY, EKG & ADDITIONAL TESTS:   < from: CT Abdomen and Pelvis w/ IV Cont (10.07.21 @ 15:52) >  IMPRESSION:  New patchy right lower lobe opacities favoring infection. Increased right pleural effusion.    New large hypodense necrotic mass/collections arising from the caudate lobe and segment 6 of the liver. Caudate lobe mass demonstrates mass effect on the IVC and hepatic veins. These may represent metastatic disease or possibly abscesses.  Innumerable metastatic lymph node/mesenteric implants and hepatic masses are significantly increased in size from previous examination.    Confluent jude hepatis lymphadenopathy/mesenteric implants are indistinguishable from the bowel of the biliopancreatic limb and demonstrate mass effect on surrounding vessels. There is attenuation of the hepatic arterial vasculature, main portal vein, and portal confluence, which are otherwise patent.  A new heterogeneous mass in the right lateral abdominal wall may represent an additional focus of metastatic disease or a hematoma.  New small ascites.    < end of copied text >

## 2021-10-08 NOTE — PROGRESS NOTE ADULT - ASSESSMENT
66M PMHx of HTN, GERD, recently diagnosed pancreatic mass, now s/p robotic converted to open Whipple on 8/6 who was transferred from [facility] to [facility] due to worsening nausea and vomiting over the last 5 days and elevated LFT's, found to have extensive metastatic disease with likely liver metastases and severe intrahepatic biliary dilatation, now s/p biliary drain placement on 9/28 2/2 signs suggestive of cholangitis and IR biopsy proven liver metastasis on 10/4. Patient was being transported to IR for biliary stent on 10/7 when he collapsed with tachycardia and prolonged QTC, hypercalcemia to 13, lactate of 10. SICU consulted for ongoing management of hypercalcemia and lactic acidosis in setting of sepsis, possible ascending cholangitis, and stage IV metastatic pancreatic CA.     NEURO: s/p collapse on 10/7 due to "weak legs", no LOC, presently A&O x 4  - no pain at this time  - monitor mental status    RESPIRATORY:   - Monitor SpO2 >92%  - no acute issues  - continuous pulse ox  - OOBTC/IS    CARDIOVASCULAR:  - Monitor hemodynamics  - clonidine 0.1 mg BID (home med)  - trend lactate (10>8)    GI/NUTRITION: s/p Whipple, stage 4 pancreatic cancer  - NPO/NGT/IVF  - Biliary drain to gravity    GENITOURINARY/RENAL: hypercalcemia/hypokalemia following collapse, hypercalcemia 2/2 progression of disease  - IVF NS @100 cc/hr   - Electrolyte repletions PRN   - Monitor I/Os  - f/u PTH, PTH related peptide, Vit D 1,25-OH and Vit D 25-OH, ionized calcium  - Calcitonin 4 mg/kg q12, per nephro    HEMATOLOGIC:  - monitor H&H  - 40 mg lovenox daily    INFECTIOUS DISEASE: s/p placement of biliary drain (9/28) w/ concern for ascending cholangitis  - BCx e.coli+ 9/28, Bcx 10/1 NGTD x2  - Biliary cx e.coli and Klebsiella variicola + 9/27  - Continue zosyn (10/6 - )  - Trend wbc count, fever curve    ENDOCRINE:  - Monitor glucose    Lines:  biliary drain  PIV  NGT    DISPO: SICU     66M PMHx of HTN, GERD, recently diagnosed pancreatic mass, now s/p robotic converted to open Whipple on 8/6 who was transferred from [facility] to [facility] due to worsening nausea and vomiting over the last 5 days and elevated LFT's, found to have extensive metastatic disease with likely liver metastases and severe intrahepatic biliary dilatation, now s/p biliary drain placement on 9/28 2/2 signs suggestive of cholangitis and IR biopsy proven liver metastasis on 10/4. Patient was being transported to IR for biliary stent on 10/7 when he collapsed with tachycardia and prolonged QTC, hypercalcemia to 13, lactate of 10. SICU consulted for ongoing management of hypercalcemia and lactic acidosis in setting of sepsis, possible ascending cholangitis, and stage IV metastatic pancreatic CA.     NEURO: s/p collapse (fall) on 10/7 due to "weak legs", no LOC, presently A&O x 4  - no pain at this time  - monitor mental status    RESPIRATORY:   - Monitor SpO2 >92%  - no acute issues  - continuous pulse ox  - OOBTC/IS    CARDIOVASCULAR:  - Monitor hemodynamics  - clonidine 0.1 mg BID (home med)  - trend lactate (10>8)    GI/NUTRITION: s/p Whipple, stage 4 pancreatic cancer  - NPO/NGT/IVF  - Biliary drain to gravity    GENITOURINARY/RENAL: hypercalcemia/hypokalemia following collapse, hypercalcemia 2/2 progression of disease  - IVF d/c NS, switch to Plasmalyte  - Electrolyte repletions PRN   - Monitor I/Os  - f/u PTH, PTH related peptide, Vit D 1,25-OH and Vit D 25-OH, ionized calcium  - Calcitonin 4 mg/kg q12, per nephro    HEMATOLOGIC:  - monitor H&H  - 40 mg lovenox daily    INFECTIOUS DISEASE: s/p placement of biliary drain (9/28) w/ concern for ascending cholangitis  - 9/28 BCx E.coli+, BCx 10/1 NGTD x2  - Biliary cx e.coli and Klebsiella variicola + 9/27  - Continue zosyn (10/6 - )  - Trend wbc count, fever curve    ENDOCRINE:  - monitor glucose    Lines:  biliary drain  PIV  NGT    DISPO: SICU

## 2021-10-08 NOTE — PROGRESS NOTE ADULT - ATTENDING COMMENTS
Critical Care Dx    E83.52 Hypercalcemia   -calcitonin and lasix given, may be PTH related given tumor burden vs tumor lysis syndrome (calcium should be low in this state however)  -neuro checks  C25.0 Malignant neoplasm of head of pancreas   -s/p resection with progression of disease, surg onc input appreciated  Z91.89 At risk for malnutrition   -inability to take PO, may need TPN for chronic nutritional needs   -weekly labs  K56.609 Small bowel obstruction   -malignant small bowel obstruction, would avoid steroids given possible cholangitic picture (no with biliary decompression)     The patient is a critical care patient with life threatening hemodynamic and respiratory instability in SICU.  I have personally interviewed and examined the patient, reviewed data and laboratory tests/x-rays and all pertinent electronic images.  The SICU team has a constant risk benefit analyzes discussion with the primary team, all consultants, House Staff and PA's on all decisions.   Time involved in performance of separately billable procedures was not counted toward my critical care time. There is no overlap.    I have personally provided 60 minutes of critical care time concurrently with the resident/fellow. This time excludes time spent on separate procedures and time spent teaching. I have reviewed the resident's/fellow's documentation and agree with the assessment and plan of care.  I was physically present for the key portions of the evaluation and management (E/M) service provided.      Richard Mosley MD  Acute and Critical Care Surgery

## 2021-10-08 NOTE — PROGRESS NOTE ADULT - SUBJECTIVE AND OBJECTIVE BOX
[facility] DIVISION OF KIDNEY DISEASES AND HYPERTENSION -- FOLLOW UP NOTE  --------------------------------------------------------------------------------  66M w/PMHx of HTN, GERD, and recently diagnosed pancreatic mass, s/p robot converted to open Whipple on 9/6 who was transferred from [facility] to [facility] due to worsening nausea and vomiting over the last 5 days. Pt notes emesis to be bilious w/o evidence of blood. Pt denies abdominal and chest pain, fevers, chills, SOB, dizziness. Patient was afebrile, hemodynamically stable on presentation. Labs significant for elevated LFTs (T.bili 7.2,  / , AlkPhos 267). CT AP at [facility] demonstrated extensive metastatic disease in the jude hepatis, celiac axis, and peritoneal cavity with likely liver metastases and severe intrahepatic biliary dilatation.  Nephrology consulted for evaluation of worsening hypercalcemia in setting of progressive metastatic disease.     Patient was seen and examined at bedside. Reported feeling unwell. Denies CP, SOB, fever, chills, diarrhea, LE edema or dysuria.    PAST HISTORY  --------------------------------------------------------------------------------  No significant changes to PMH, PSH, FHx, SHx, unless otherwise noted    ALLERGIES & MEDICATIONS  --------------------------------------------------------------------------------  Allergies    No Known Allergies    Intolerances    hydrALAZINE (Vomiting)  hydrALAZINE (Vomiting; Nausea)    Standing Inpatient Medications  cloNIDine 0.1 milliGRAM(s) Oral two times a day  enoxaparin Injectable 40 milliGRAM(s) SubCutaneous daily  furosemide   Injectable 20 milliGRAM(s) IV Push once  magnesium sulfate  IVPB 2 Gram(s) IV Intermittent once  multiple electrolytes Injection Type 1 1000 milliLiter(s) IV Continuous <Continuous>  multiple electrolytes Injection Type 1 Bolus 1000 milliLiter(s) IV Bolus once  piperacillin/tazobactam IVPB.. 3.375 Gram(s) IV Intermittent every 8 hours    PRN Inpatient Medications      REVIEW OF SYSTEMS  --------------------------------------------------------------------------------  Gen: No fevers/chills  Respiratory: No dyspnea, cough  CV: No chest pain  GI: No abdominal pain, diarrhea  : No dysuria, hematuria  MSK: No  edema    All other systems were reviewed and are negative, except as noted.    VITALS/PHYSICAL EXAM  --------------------------------------------------------------------------------  T(C): 37.3 (10-08-21 @ 08:00), Max: 37.4 (10-07-21 @ 16:23)  HR: 113 (10-08-21 @ 09:45) (95 - 151)  BP: 125/61 (10-08-21 @ 09:00) (113/52 - 146/79)  RR: 26 (10-08-21 @ 09:45) (11 - 27)  SpO2: 98% (10-08-21 @ 09:45) (92% - 100%)  Wt(kg): --    10-07-21 @ 07:01  -  10-08-21 @ 07:00  --------------------------------------------------------  IN: 4200 mL / OUT: 2720 mL / NET: 1480 mL    10-08-21 @ 07:01  -  10-08-21 @ 09:50  --------------------------------------------------------  IN: 0 mL / OUT: 280 mL / NET: -280 mL    Physical Exam:  	Gen: NAD  	HEENT: dry mucous membranes  	Pulm: CTA B/L, no crackles   	CV: S1S2 tachy  	Abd: Soft, +BS   	Ext: No LE edema B/L  	Neuro: Awake  	Skin: Warm and dry      LABS/STUDIES  --------------------------------------------------------------------------------              8.1    30.24 >-----------<  596      [10-08-21 @ 00:56]              24.9     145  |  108  |  14  ----------------------------<  110      [10-08-21 @ 06:51]  3.3   |  20  |  0.70        Ca     12.4     [10-08-21 @ 06:51]      iCa    1.87     [10-08 @ 00:56]      Mg     1.70     [10-08-21 @ 06:51]      Phos  2.8     [10-08-21 @ 06:51]    TPro  5.7  /  Alb  2.6  /  TBili  5.8  /  DBili  x   /  AST  86  /  ALT  54  /  AlkPhos  111  [10-07-21 @ 16:57]    PT/INR: PT 15.5 , INR 1.38       [10-07-21 @ 16:57]  PTT: 23.7       [10-07-21 @ 16:57]      Creatinine Trend:  SCr 0.70 [10-08 @ 06:51]  SCr 0.72 [10-08 @ 00:56]  SCr 0.71 [10-07 @ 16:57]  SCr 0.65 [10-07 @ 06:58]  SCr 0.51 [10-06 @ 06:24]        PTH -- (Ca --)      [10-07-21 @ 12:10]   9    HBsAb <3.0      [09-16-21 @ 01:19]  HBsAb Nonreact      [09-16-21 @ 01:19]  HBsAg Nonreact      [09-16-21 @ 01:19]  HBcAb Nonreact      [09-16-21 @ 01:19]  HCV 12.87, Reactive      [09-16-21 @ 01:19]  HIV Nonreact      [09-16-21 @ 01:13]     [facility] DIVISION OF KIDNEY DISEASES AND HYPERTENSION -- FOLLOW UP NOTE  --------------------------------------------------------------------------------  66M w/PMHx of HTN, GERD, and recently diagnosed pancreatic mass, s/p robot converted to open Whipple on 9/6 who was transferred from [facility] to [facility] due to worsening nausea and vomiting over the last 5 days. Pt notes emesis to be bilious w/o evidence of blood. Pt denies abdominal and chest pain, fevers, chills, SOB, dizziness. Patient was afebrile, hemodynamically stable on presentation. Labs significant for elevated LFTs (T.bili 7.2,  / , AlkPhos 267). CT AP at [facility] demonstrated extensive metastatic disease in the jude hepatis, celiac axis, and peritoneal cavity with likely liver metastases and severe intrahepatic biliary dilatation.  Nephrology consulted for evaluation of worsening hypercalcemia in setting of progressive metastatic disease.     Patient was seen and examined at bedside. Reported feeling unwell. Denies CP, SOB, fever, chills, diarrhea, LE edema or dysuria.    PAST HISTORY  --------------------------------------------------------------------------------  No significant changes to PMH, PSH, FHx, SHx, unless otherwise noted    ALLERGIES & MEDICATIONS  --------------------------------------------------------------------------------  Allergies    No Known Allergies    Intolerances    hydrALAZINE (Vomiting)  hydrALAZINE (Vomiting; Nausea)    Standing Inpatient Medications  cloNIDine 0.1 milliGRAM(s) Oral two times a day  enoxaparin Injectable 40 milliGRAM(s) SubCutaneous daily  furosemide   Injectable 20 milliGRAM(s) IV Push once  magnesium sulfate  IVPB 2 Gram(s) IV Intermittent once  multiple electrolytes Injection Type 1 1000 milliLiter(s) IV Continuous <Continuous>  multiple electrolytes Injection Type 1 Bolus 1000 milliLiter(s) IV Bolus once  piperacillin/tazobactam IVPB.. 3.375 Gram(s) IV Intermittent every 8 hours    PRN Inpatient Medications      REVIEW OF SYSTEMS  --------------------------------------------------------------------------------  Gen: No fevers/chills  Respiratory: No dyspnea, cough  CV: No chest pain  GI: No abdominal pain, diarrhea  : No dysuria, hematuria  MSK: No  edema    All other systems were reviewed and are negative, except as noted.    VITALS/PHYSICAL EXAM  --------------------------------------------------------------------------------  T(C): 37.3 (10-08-21 @ 08:00), Max: 37.4 (10-07-21 @ 16:23)  HR: 113 (10-08-21 @ 09:45) (95 - 151)  BP: 125/61 (10-08-21 @ 09:00) (113/52 - 146/79)  RR: 26 (10-08-21 @ 09:45) (11 - 27)  SpO2: 98% (10-08-21 @ 09:45) (92% - 100%)  Wt(kg): --    10-07-21 @ 07:01  -  10-08-21 @ 07:00  --------------------------------------------------------  IN: 4200 mL / OUT: 2720 mL / NET: 1480 mL    10-08-21 @ 07:01  -  10-08-21 @ 09:50  --------------------------------------------------------  IN: 0 mL / OUT: 280 mL / NET: -280 mL    Physical Exam:  	Gen: NAD  	HEENT: NGT  	Pulm: CTA B/L, no crackles   	CV: S1S2 tachy  	Abd: Soft, +BS  drain noted  	Ext: No LE edema B/L  	Neuro: Awake  	Skin: Warm and dry                    LABS/STUDIES  --------------------------------------------------------------------------------              8.1    30.24 >-----------<  596      [10-08-21 @ 00:56]              24.9     145  |  108  |  14  ----------------------------<  110      [10-08-21 @ 06:51]  3.3   |  20  |  0.70        Ca     12.4     [10-08-21 @ 06:51]      iCa    1.87     [10-08 @ 00:56]      Mg     1.70     [10-08-21 @ 06:51]      Phos  2.8     [10-08-21 @ 06:51]    TPro  5.7  /  Alb  2.6  /  TBili  5.8  /  DBili  x   /  AST  86  /  ALT  54  /  AlkPhos  111  [10-07-21 @ 16:57]    PT/INR: PT 15.5 , INR 1.38       [10-07-21 @ 16:57]  PTT: 23.7       [10-07-21 @ 16:57]      Creatinine Trend:  SCr 0.70 [10-08 @ 06:51]  SCr 0.72 [10-08 @ 00:56]  SCr 0.71 [10-07 @ 16:57]  SCr 0.65 [10-07 @ 06:58]  SCr 0.51 [10-06 @ 06:24]        PTH -- (Ca --)      [10-07-21 @ 12:10]   9    HBsAb <3.0      [09-16-21 @ 01:19]  HBsAb Nonreact      [09-16-21 @ 01:19]  HBsAg Nonreact      [09-16-21 @ 01:19]  HBcAb Nonreact      [09-16-21 @ 01:19]  HCV 12.87, Reactive      [09-16-21 @ 01:19]  HIV Nonreact      [09-16-21 @ 01:13]

## 2021-10-08 NOTE — PROGRESS NOTE ADULT - ASSESSMENT
Assessment:          Plan: 66 M with Hx HTN, GERD, and recently diagnosed pancreatic mass, s/p robot converted to open Whipple on 9/6/21 transferred from [facility] for elevated LFTs, nausea, and emesis, found to have extensive metastatic disease in post-surgical site, likely due to rapid recurrence, with c/f biliary obstruction; now s/p biliary drain placement 9/28 2/2 signs & symptoms cholangitis. Transferred to SICU 10/7 for symptomatic hypercalcemia & hemodynamic instability.    Plan:  - F/u PM labs, blood cultures, EKG  - Pain control PRN  - Continue compazine and baclofen  - Continue home meds as ordered  - Regular diet  - Appreciate heme/onc recs    - Calcitonin for hypercalcemia, may be causing his emesis  - Monitor leukocytosis; Appreciate ID recommendations, currently on Unasyn, will transition to Cipro/Flagyl on discharge; most recent blood cultures 10/2 NGTD  - PT consult  - DVT ppx: LVX  - Appreciate SICU care  - Healthcare proxy obtained    D Team Surgery  [id number]   no

## 2021-10-08 NOTE — PROGRESS NOTE ADULT - ASSESSMENT
67 y/o M PMHx etOH abuse, HCV (s/p treatment), and recently diagnosed pancreatic CA (s/p open Whipple on 9/6/21), initially presented with N/V, found to have biliary obstruction 2/2 malignancy. Developed cholangitis and e.coli bacteremia, now s/p perc biliary drain placement on 9/28.     #E. Coli Bacteremia 2/2 Cholangitis, Biliary Obstruction - pancreatic CA (s/p Whipple last month) now with POD and acute biliary obstruction c/b cholangitis and pan-sensitive e. coli bacteremia, s/p perc biliary drain. Bacteremia has persisted despite appropriate abx therapy, raising concern for interval development of intra-abdominal abscess. No worsening back pain to suggest vertebral focus of infection.    Overall E. Coli Bacteremia, Cholangitis, Biliary Obstruction, transaminitis.   recent blood cx negative  s/p bx of liver lesion   rising leucocytosis, tachycardia, SIRS/Sepsis   hypercalcemia, lactic acidosis,   worsening mets ds with necrotic tumor on recent CT   doubt underlying infection as an etiology       Recs:  - c/w zosyn for now  - repeat blood cx, NTD   - s/p liver bx, path consistent with mets ds.    - Trend CBC for leucocytosis, rising   - hypercalcemia, management per primary   [facility] discussion in progress.       Please call ID service for questions over weekend at [phone].       Ilan Al  Pager: [phone]. If no response or past 5 pm call [phone].

## 2021-10-08 NOTE — PROGRESS NOTE ADULT - SUBJECTIVE AND OBJECTIVE BOX
INTERVAL HPI/OVERNIGHT EVENTS:  Patient S&E at bedside. No o/n events.     VITAL SIGNS:  T(F): 96.5 (10-08-21 @ 12:00)  HR: 115 (10-08-21 @ 12:00)  BP: 144/82 (10-08-21 @ 12:00)  RR: 28 (10-08-21 @ 12:00)  SpO2: 100% (10-08-21 @ 12:00)  Wt(kg): --    PHYSICAL EXAM:    Constitutional: NAD +jaundice   HEAD/EYES: +scleral icterus, no conjunctival injection  Respiratory:  + air entry b/l  GI:  + biliary drain, distended, nontender, no guarding  :  no uribe, no CVA tenderness  Musculoskeletal:  no joint swelling   Neurologic: awake and alert,  no focal findings  Skin:  no rash, no erythema, no phlebitis  Extremities: mild edema   Psychiatric:  awake, alert, appropriate mood      MEDICATIONS  (STANDING):  calcitonin Injectable 290 International Unit(s) IntraMuscular every 12 hours  cloNIDine 0.1 milliGRAM(s) Oral two times a day  enoxaparin Injectable 40 milliGRAM(s) SubCutaneous daily  multiple electrolytes Injection Type 1 1000 milliLiter(s) (150 mL/Hr) IV Continuous <Continuous>  pamidronate IVPB 60 milliGRAM(s) IV Intermittent once  piperacillin/tazobactam IVPB.. 3.375 Gram(s) IV Intermittent every 8 hours    MEDICATIONS  (PRN):      Allergies    No Known Allergies    Intolerances    hydrALAZINE (Vomiting)  hydrALAZINE (Vomiting; Nausea)      LABS:                        8.1    30.24 )-----------( 596      ( 08 Oct 2021 00:56 )             24.9     10-08    145  |  108<H>  |  14  ----------------------------<  110<H>  3.3<L>   |  20<L>  |  0.70    Ca    12.4<H>      08 Oct 2021 06:51  Phos  2.8     10-08  Mg     1.70     10-08    TPro  5.4<L>  /  Alb  2.4<L>  /  TBili  6.1<H>  /  DBili  3.2<H>  /  AST  247<H>  /  ALT  93<H>  /  AlkPhos  111  10-08    PT/INR - ( 07 Oct 2021 16:57 )   PT: 15.5 sec;   INR: 1.38 ratio         PTT - ( 07 Oct 2021 16:57 )  PTT:23.7 sec      RADIOLOGY & ADDITIONAL TESTS:  Studies reviewed.     INTERVAL HPI/OVERNIGHT EVENTS:  Patient S&E at bedside. No o/n events.     VITAL SIGNS:  T(F): 96.5 (10-08-21 @ 12:00)  HR: 115 (10-08-21 @ 12:00)  BP: 144/82 (10-08-21 @ 12:00)  RR: 28 (10-08-21 @ 12:00)  SpO2: 100% (10-08-21 @ 12:00)  Wt(kg): --    PHYSICAL EXAM:    Constitutional: NAD +jaundice   HEAD/EYES: +scleral icterus, no conjunctival injection  Respiratory:  no inc wob  GI:  + biliary drain, distended, nontender, no guarding  :  no uribe, no CVA tenderness  Musculoskeletal:  no joint swelling   Neurologic: awake and alert,  no focal findings  Skin:  no rash, no erythema, no phlebitis  Extremities: mild edema   Psychiatric:  awake, alert, appropriate mood      MEDICATIONS  (STANDING):  calcitonin Injectable 290 International Unit(s) IntraMuscular every 12 hours  cloNIDine 0.1 milliGRAM(s) Oral two times a day  enoxaparin Injectable 40 milliGRAM(s) SubCutaneous daily  multiple electrolytes Injection Type 1 1000 milliLiter(s) (150 mL/Hr) IV Continuous <Continuous>  pamidronate IVPB 60 milliGRAM(s) IV Intermittent once  piperacillin/tazobactam IVPB.. 3.375 Gram(s) IV Intermittent every 8 hours    MEDICATIONS  (PRN):      Allergies    No Known Allergies    Intolerances    hydrALAZINE (Vomiting)  hydrALAZINE (Vomiting; Nausea)      LABS:                        8.1    30.24 )-----------( 596      ( 08 Oct 2021 00:56 )             24.9     10-08    145  |  108<H>  |  14  ----------------------------<  110<H>  3.3<L>   |  20<L>  |  0.70    Ca    12.4<H>      08 Oct 2021 06:51  Phos  2.8     10-08  Mg     1.70     10-08    TPro  5.4<L>  /  Alb  2.4<L>  /  TBili  6.1<H>  /  DBili  3.2<H>  /  AST  247<H>  /  ALT  93<H>  /  AlkPhos  111  10-08    PT/INR - ( 07 Oct 2021 16:57 )   PT: 15.5 sec;   INR: 1.38 ratio         PTT - ( 07 Oct 2021 16:57 )  PTT:23.7 sec      RADIOLOGY & ADDITIONAL TESTS:  Studies reviewed.     INTERVAL HPI/OVERNIGHT EVENTS:  Patient S&E at bedside. No o/n events.   -Patient's states he's "doing well"  -Would like to speak to his primary oncologist today    VITAL SIGNS:  T(F): 96.5 (10-08-21 @ 12:00)  HR: 115 (10-08-21 @ 12:00)  BP: 144/82 (10-08-21 @ 12:00)  RR: 28 (10-08-21 @ 12:00)  SpO2: 100% (10-08-21 @ 12:00)  Wt(kg): --    PHYSICAL EXAM:    Constitutional: NAD +jaundice   HEAD/EYES: +scleral icterus, no conjunctival injection  Respiratory:  no inc wob  GI:  + biliary drain, distended, nontender, no guarding  :  no uribe, no CVA tenderness  Musculoskeletal:  no joint swelling   Neurologic: awake and alert,  no focal findings  Skin:  no rash, no erythema, no phlebitis  Extremities: mild edema   Psychiatric:  awake, alert, appropriate mood      MEDICATIONS  (STANDING):  calcitonin Injectable 290 International Unit(s) IntraMuscular every 12 hours  cloNIDine 0.1 milliGRAM(s) Oral two times a day  enoxaparin Injectable 40 milliGRAM(s) SubCutaneous daily  multiple electrolytes Injection Type 1 1000 milliLiter(s) (150 mL/Hr) IV Continuous <Continuous>  pamidronate IVPB 60 milliGRAM(s) IV Intermittent once  piperacillin/tazobactam IVPB.. 3.375 Gram(s) IV Intermittent every 8 hours    MEDICATIONS  (PRN):      Allergies    No Known Allergies    Intolerances    hydrALAZINE (Vomiting)  hydrALAZINE (Vomiting; Nausea)      LABS:                        8.1    30.24 )-----------( 596      ( 08 Oct 2021 00:56 )             24.9     10-08    145  |  108<H>  |  14  ----------------------------<  110<H>  3.3<L>   |  20<L>  |  0.70    Ca    12.4<H>      08 Oct 2021 06:51  Phos  2.8     10-08  Mg     1.70     10-08    TPro  5.4<L>  /  Alb  2.4<L>  /  TBili  6.1<H>  /  DBili  3.2<H>  /  AST  247<H>  /  ALT  93<H>  /  AlkPhos  111  10-08    PT/INR - ( 07 Oct 2021 16:57 )   PT: 15.5 sec;   INR: 1.38 ratio         PTT - ( 07 Oct 2021 16:57 )  PTT:23.7 sec      RADIOLOGY & ADDITIONAL TESTS:  Studies reviewed.

## 2021-10-08 NOTE — PROGRESS NOTE ADULT - ATTENDING COMMENTS
Pathology from biopsy of liver lesion discussed with the patient, c/w metastatic pancreatic cancer. Given patient's rapid clinical and functional decline with worsening disease and liver function, patient is most appropriate for hospice care with focus on symptoms management and comfort. Discussed with patient at bedside, and he would like to speak with his primary oncologist, Dr. Boyd prior to making a final decision. Appreciate palliative care recommendations and continued GOC discussions.

## 2021-10-08 NOTE — PROGRESS NOTE ADULT - SUBJECTIVE AND OBJECTIVE BOX
66y old  Male who presents with a chief complaint of Nausea and Emesis (08 Oct 2021 12:48)      Interval history:  Afebrile, some RUQ pain, weakness, + NG tube.       Allergies:   hydrALAZINE (Vomiting; Nausea)  No Known Allergies      Antimicrobials:  piperacillin/tazobactam IVPB.. 3.375 Gram(s) IV Intermittent every 8 hours      REVIEW OF SYSTEMS:  No chest pain  No SOB  No dysuria  No rash.       Vital Signs Last 24 Hrs  T(C): 37.2 (10-08-21 @ 16:00), Max: 37.4 (10-07-21 @ 16:23)  T(F): 98.9 (10-08-21 @ 16:00), Max: 99.3 (10-07-21 @ 16:23)  HR: 119 (10-08-21 @ 16:00) (95 - 121)  BP: 128/77 (10-08-21 @ 16:00) (113/52 - 156/78)  BP(mean): 89 (10-08-21 @ 16:00) (66 - 110)  RR: 24 (10-08-21 @ 16:00) (11 - 29)  SpO2: 98% (10-08-21 @ 16:00) (92% - 100%)      PHYSICAL EXAM:  Patient in no acute distress. Alert, awake   + NG tube   Cardiovascular: S1S2 normal, tachy.   Lungs: + air entry B/L lung fields.  Gastrointestinal: soft, nondistended. + biliary drain.   Extremities: no edema.  IV sites not inflamed.                           8.1    30.24 )-----------( 596      ( 08 Oct 2021 00:56 )             24.9   10-08    146<H>  |  103  |  16  ----------------------------<  101<H>  3.3<L>   |  19<L>  |  0.72    Ca    12.6<H>      08 Oct 2021 14:48  Phos  2.4     10-08  Mg     2.70     10-08    TPro  5.4<L>  /  Alb  2.4<L>  /  TBili  6.1<H>  /  DBili  3.2<H>  /  AST  247<H>  /  ALT  93<H>  /  AlkPhos  111  10-08      LIVER FUNCTIONS - ( 08 Oct 2021 09:44 )  Alb: 2.4 g/dL / Pro: 5.4 g/dL / ALK PHOS: 111 U/L / ALT: 93 U/L / AST: 247 U/L / GGT: x             Culture - Blood (collected 07 Oct 2021 14:10)  Source: .Blood Blood-Peripheral  Preliminary Report (08 Oct 2021 15:02):    No growth to date.    Culture - Blood (collected 07 Oct 2021 12:45)  Source: .Blood Blood-Venous  Preliminary Report (08 Oct 2021 13:01):    No growth to date.      Radiology:  < from: CT Abdomen and Pelvis w/ IV Cont (10.07.21 @ 15:52) >  IMPRESSION:  New patchy right lower lobe opacities favoring infection. Increased right pleural effusion.    New large hypodense necrotic mass/collections arising from the caudate lobe and segment 6 of the liver. Caudate lobe mass demonstrates mass effect on the IVC and hepatic veins. These may represent metastatic disease or possibly abscesses.  Innumerable metastatic lymph node/mesenteric implants and hepatic masses are significantly increased in size from previous examination.    Confluent jude hepatis lymphadenopathy/mesenteric implants are indistinguishable from the bowel of the biliopancreatic limb and demonstrate mass effect on surrounding vessels. There is attenuation of the hepatic arterial vasculature, main portal vein, and portal confluence, which are otherwise patent.  A new heterogeneous mass in the right lateral abdominal wall may represent an additional focus of metastatic disease or a hematoma.  New small ascites.

## 2021-10-08 NOTE — PROGRESS NOTE ADULT - ASSESSMENT
66M w/PMHx of HTN, GERD, and recently diagnosed pancreatic mass, s/p robot converted to open Whipple on 8/6/21 who was transferred from [facility] to [facility] due to worsening nausea and vomiting over the last 5 found to have extensive metastatic disease on CT A/P at OSH    #Recently diagnosed Stage III pancreatic cancer, now with suspected metastatic disease   -Pt initially admitted to [facility] in July 2021 with painless jaundice, found to have an infiltrating pancreatic mass in the pancreatic head with obstruction and dilatation of the biliary tree and pancreatic duct. He underwent endoscopic U/S, documenting a 2.7 cm pancreatic head mass s/p bx and sphincterotomy with placement of bile duct stent. Pt was opposed to neoadjuvant treatment and completed an open Whipple resection 8/6/21, 3.8 x 3.4 x 2.7cm invasive pancreatic adeno ca, moderately to poorly differentiated, 14/42 LN were positive, surgical margins were positive at the pancreatic neck, uncinate and hepatic duct margins. LVI and PNI was present. Final staging was [id number] (stage III)   -The patient had discussions with his oncologist regarding starting adjuvant therapy with modified FOLFIRINOX vs gemcitabine and capecitabine as pt was hesitant to proceed with chemotherapy.    -Ct A/P now showing suspected metastatic disease in the jude hepatis, celiac axis and peritoneal cavity with likely liver mets and severe intrahepatic biliary dilatation.    -CT chest w IV contrast showed no pulm nodules, 2cm LN in right cardiophrenic angle has increased in size when compared to prior CT.   -Pt s/p IR guided biliary drain placement on 9/28  -S/p IR guided liver bx of metastatic lesion- path consistent with poorly differentiated adeno ca, favoring pancreaticobiliary orign.   Path confirms stage IV disease- no further workup needed from onc standpoint. No objection for discharge planning from onc- will update pt's oncologist in [place] and followup outpatient.  -Patient's primary oncologist Dr. Boyd at [facility] updated. Per discussion with Dr. Boyd, given rapid POD and overall functional decline, patient would not be a candidate for further DMT at this time. He would be an appropriate hospice candidate. Recommend continued discussions with Palliative Care.     Elvira Lux MD  Hematology/Oncology Fellow, PGY-5  Pager: [phone]  After 5pm and on weekends please page on-call fellow  **COVERING FELLOW FOR TODAY ONLY**       66M w/PMHx of HTN, GERD, and recently diagnosed pancreatic mass, s/p robot converted to open Whipple on 8/6/21 who was transferred from [facility] to [facility] due to worsening nausea and vomiting over the last 5 found to have extensive metastatic disease on CT A/P at OSH    #Recently diagnosed Stage III pancreatic cancer, now with suspected metastatic disease   -Pt initially admitted to [facility] in July 2021 with painless jaundice, found to have an infiltrating pancreatic mass in the pancreatic head with obstruction and dilatation of the biliary tree and pancreatic duct. He underwent endoscopic U/S, documenting a 2.7 cm pancreatic head mass s/p bx and sphincterotomy with placement of bile duct stent. Pt was opposed to neoadjuvant treatment and completed an open Whipple resection 8/6/21, 3.8 x 3.4 x 2.7cm invasive pancreatic adeno ca, moderately to poorly differentiated, 14/42 LN were positive, surgical margins were positive at the pancreatic neck, uncinate and hepatic duct margins. LVI and PNI was present. Final staging was [id number] (stage III)   -The patient had discussions with his oncologist regarding starting adjuvant therapy with modified FOLFIRINOX vs gemcitabine and capecitabine as pt was hesitant to proceed with chemotherapy.    -Ct A/P now showing suspected metastatic disease in the jude hepatis, celiac axis and peritoneal cavity with likely liver mets and severe intrahepatic biliary dilatation.    -CT chest w IV contrast showed no pulm nodules, 2cm LN in right cardiophrenic angle has increased in size when compared to prior CT.   -Pt s/p IR guided biliary drain placement on 9/28  -S/p IR guided liver bx of metastatic lesion- path consistent with poorly differentiated adeno ca, favoring pancreaticobiliary orign.   Path confirms stage IV disease- no further workup needed from onc standpoint. No objection for discharge planning from onc- will update pt's oncologist in [place] and followup outpatient.  -Patient's primary oncologist Dr. Boyd at [facility] has been updated. Per discussion with Dr. Boyd, given rapid POD and overall functional decline now with elevated Tbili and lactate, patient would not be a candidate for further DMT at this time. He would be an appropriate hospice candidate. This was discussed with the patient today and he would like to speak to Dr. Boyd prior to making a decision. Appreciate Palliative Care input.    Elvira Lux MD  Hematology/Oncology Fellow, PGY-5  Pager: [phone]  After 5pm and on weekends please page on-call fellow  **COVERING FELLOW FOR TODAY ONLY**

## 2021-10-09 NOTE — CONSULT NOTE ADULT - CONSULT REQUESTED DATE/TIME
02-Oct-2021 09:29
07-Oct-2021 10:00
07-Oct-2021 13:44
27-Sep-2021 12:56
09-Oct-2021 10:00
29-Sep-2021 12:44
27-Sep-2021 15:18
28-Sep-2021 11:15
07-Oct-2021 22:06

## 2021-10-09 NOTE — PROGRESS NOTE ADULT - SUBJECTIVE AND OBJECTIVE BOX
SURGERY PROGRESS NOTE    Subjective:     Vital Signs:  Vital Signs Last 24 Hrs  T(C): 36 (09 Oct 2021 00:00), Max: 37.3 (08 Oct 2021 08:00)  T(F): 96.8 (09 Oct 2021 00:00), Max: 99.1 (08 Oct 2021 08:00)  HR: 119 (09 Oct 2021 01:00) (99 - 124)  BP: 127/62 (09 Oct 2021 01:00) (108/65 - 156/78)  BP(mean): 76 (09 Oct 2021 01:00) (66 - 112)  RR: 20 (09 Oct 2021 01:00) (20 - 29)  SpO2: 96% (09 Oct 2021 01:00) (92% - 100%)    Physical Exam:  General:   Lungs:   CV:   Abdomen:  Extremities:          SURGERY PROGRESS NOTE    Subjective: Patient still c/o hiccups today. No complaints of pain at this time. Drain flushed this AM.    24 Hour Interval/Overnight Events:      - Plasmalyte 1L x2, Lasix 20 x 2  - Bisphosphonate x1  - Calcitonin x2  -     Vital Signs:  Vital Signs Last 24 Hrs  T(C): 36 (09 Oct 2021 00:00), Max: 37.3 (08 Oct 2021 08:00)  T(F): 96.8 (09 Oct 2021 00:00), Max: 99.1 (08 Oct 2021 08:00)  HR: 119 (09 Oct 2021 01:00) (99 - 124)  BP: 127/62 (09 Oct 2021 01:00) (108/65 - 156/78)  BP(mean): 76 (09 Oct 2021 01:00) (66 - 112)  RR: 20 (09 Oct 2021 01:00) (20 - 29)  SpO2: 96% (09 Oct 2021 01:00) (92% - 100%)    PHYSICAL EXAM:  Constitutional: A&Ox3, NAD  Respiratory: Unlabored breathing  Abdomen: Soft, nondistended, NTTP. No rebound or guarding. Well healed midline scar. Biliary drain patent with bilious output.  Extremities: WWP, RICHTER spontaneously    .  LABS:                         7.9    25.31 )-----------( 562      ( 09 Oct 2021 03:42 )             24.3     10-09    144  |  103  |  20  ----------------------------<  143<H>  3.1<L>   |  21<L>  |  0.61    Ca    13.3<HH>      09 Oct 2021 16:03  Phos  1.6     10-09  Mg     2.00     10-09    TPro  5.4<L>  /  Alb  2.4<L>  /  TBili  7.4<H>  /  DBili  x   /  AST  246<H>  /  ALT  114<H>  /  AlkPhos  117  10-09          Lactate, Blood: 12.1 mmol/L (10-09 @ 16:03)  Lactate, Blood: 11.8 mmol/L (10-09 @ 10:14)      RADIOLOGY, EKG & ADDITIONAL TESTS: Reviewed.

## 2021-10-09 NOTE — PROGRESS NOTE ADULT - SUBJECTIVE AND OBJECTIVE BOX
[facility] Division of Kidney Diseases & Hypertension  FOLLOW UP NOTE  [phone]--------------------------------------------------------------------------------    HPI : 66 year old male with HTN and recently diagnosed pancreatic mass, s/p Lurdesipple on 9/6 who was transferred from [facility] to [facility] due to worsening nausea and vomiting over the last 5 days. Patient was afebrile, hemodynamically stable on presentation. Labs significant for elevated LFTs (T.bili 7.2,  / , AlkPhos 267). CT AP at [facility] demonstrated extensive metastatic disease in the jude hepatis, celiac axis, and peritoneal cavity with likely liver metastases and severe intrahepatic biliary dilatation.  Nephrology consulted for evaluation of worsening hypercalcemia in setting of progressive metastatic disease.     Patient was seen and examined at bedside. Reported feeling unwell and complained of being thirsty. Pt. s/p Calcitonin and Pamidronate yesterday. Serum calcium remains elevated this AM.    PAST HISTORY  --------------------------------------------------------------------------------  No significant changes to PMH, PSH, FHx, SHx, unless otherwise noted    ALLERGIES & MEDICATIONS  --------------------------------------------------------------------------------  Allergies    No Known Allergies    Intolerances    hydrALAZINE (Vomiting)  hydrALAZINE (Vomiting; Nausea)    Standing Inpatient Medications  dextrose 5% + sodium chloride 0.45%. 1000 milliLiter(s) IV Continuous <Continuous>  enoxaparin Injectable 40 milliGRAM(s) SubCutaneous daily  fat emulsion (Fish Oil and Plant Based) 20% Infusion 8.3 mL/Hr IV Continuous <Continuous>  multiple electrolytes Injection Type 1 1000 milliLiter(s) IV Continuous <Continuous>  Parenteral Nutrition - Adult 1 Each TPN Continuous <Continuous>  piperacillin/tazobactam IVPB.. 3.375 Gram(s) IV Intermittent every 8 hours  potassium chloride  10 mEq/100 mL IVPB 10 milliEquivalent(s) IV Intermittent every 1 hour    VITALS/PHYSICAL EXAM  --------------------------------------------------------------------------------  T(C): 36.1 (10-09-21 @ 08:00), Max: 37.2 (10-08-21 @ 16:00)  HR: 121 (10-09-21 @ 11:00) (114 - 124)  BP: 122/74 (10-09-21 @ 11:00) (108/65 - 144/82)  RR: 25 (10-09-21 @ 11:00) (20 - 29)  SpO2: 97% (10-09-21 @ 11:00) (90% - 100%)  Wt(kg): --    10-08-21 @ 07:01  -  10-09-21 @ 07:00  --------------------------------------------------------  IN: 6655 mL / OUT: 4057 mL / NET: 2598 mL    10-09-21 @ 07:01  -  10-09-21 @ 11:14  --------------------------------------------------------  IN: 450 mL / OUT: 300 mL / NET: 150 mL    Physical Exam:              Gen: NAD  	HEENT: NGT  	Pulm: CTA B/L, no crackles   	CV: S1S2 tachy  	Abd: Soft, +BS  drain noted  	Ext: No LE edema B/L  	Neuro: Awake  	Skin: Warm and dry    LABS/STUDIES  --------------------------------------------------------------------------------              7.9    25.31 >-----------<  562      [10-09-21 @ 03:42]              24.3     145  |  104  |  18  ----------------------------<  84      [10-09-21 @ 10:14]  3.4   |  22  |  0.62        Ca     13.2     [10-09-21 @ 10:14]      iCa    1.80     [10-08 @ 09:41]      Mg     2.00     [10-09-21 @ 10:14]      Phos  2.2     [10-09-21 @ 10:14]    TPro  5.4  /  Alb  2.4  /  TBili  7.4  /  DBili  x   /  AST  246  /  ALT  114  /  AlkPhos  117  [10-09-21 @ 03:42]    Creatinine Trend:  SCr 0.62 [10-09 @ 10:14]  SCr 0.66 [10-09 @ 03:42]  SCr 0.67 [10-08 @ 22:13]  SCr 0.72 [10-08 @ 14:48]  SCr 0.70 [10-08 @ 06:51]    PTH -- (Ca 12.4)      [10-08-21 @ 09:40]   10  PTH -- (Ca --)      [10-07-21 @ 12:10]   9  Vitamin D (25OH) 14.8      [10-08-21 @ 13:14]

## 2021-10-09 NOTE — CONSULT NOTE ADULT - CONSULT REQUESTED BY NAME
Dr. Johnson
Dr. Johnson
Rivas Holliday
Surgical Oncology
Dr. Johnson
Dr. Johnson
Tj Johnson
Dr. Johnson
Dr. Jackson

## 2021-10-09 NOTE — PROGRESS NOTE ADULT - ASSESSMENT
66M PMHx of HTN, GERD, recently diagnosed pancreatic mass, now s/p robotic converted to open Whipple on 8/6 who was transferred from [facility] to [facility] due to worsening nausea and vomiting over the last 5 days and elevated LFT's, found to have extensive metastatic disease with likely liver metastases and severe intrahepatic biliary dilatation, now s/p biliary drain placement on 9/28 2/2 signs suggestive of cholangitis and IR biopsy proven liver metastasis on 10/4. Patient was being transported to IR for biliary stent on 10/7 when he collapsed with tachycardia and prolonged QTC, hypercalcemia to 13, lactate of 10. SICU consulted for ongoing management of hypercalcemia and lactic acidosis in setting of sepsis, possible ascending cholangitis, and stage IV metastatic pancreatic CA.     NEURO: s/p collapse (fall) on 10/7 due to "weak legs", no LOC, presently A&O x 4  - no pain at this time  - monitor mental status    RESPIRATORY:   - Monitor SpO2 >92%  - no acute issues  - continuous pulse ox  - OOBTC/IS    CARDIOVASCULAR:  - Monitor hemodynamics  - trend lactate     GI/NUTRITION: s/p Whipple, stage 4 pancreatic cancer  - NPO/NGT/IVF  - Biliary drain to gravity    GENITOURINARY/RENAL: hypercalcemia/hypokalemia following collapse, hypercalcemia 2/2 progression of disease  - Plasmalyte @150  - BMP q6  - Electrolyte repletions PRN   - Monitor I/Os  - F/u PTH, PTH related peptide, Vit D 1,25-OH and Vit D 25-OH, ionized calcium  - S/p Calcitonin 4 mg/kg q12, per nephro    HEMATOLOGIC:  - monitor H&H  - 40 mg lovenox daily    INFECTIOUS DISEASE: s/p placement of biliary drain (9/28) w/ concern for ascending cholangitis  - 9/28 BCx E.coli+, BCx 10/1 NGTD x2  - Biliary cx e.coli and Klebsiella variicola + 9/27  - Continue zosyn (10/6 - )  - Trend wbc count, fever curve    ENDOCRINE:  - monitor glucose    Lines:  biliary drain  PIV  NGT    DISPO: SICU     66M PMHx of HTN, GERD, recently diagnosed pancreatic mass, now s/p robotic converted to open Whipple on 8/6 who was transferred from [facility] to [facility] due to worsening nausea and vomiting over the last 5 days and elevated LFT's, found to have extensive metastatic disease with likely liver metastases and severe intrahepatic biliary dilatation, now s/p biliary drain placement on 9/28 2/2 signs suggestive of cholangitis and IR biopsy proven liver metastasis on 10/4. Patient was being transported to IR for biliary stent on 10/7 when he collapsed with tachycardia and prolonged QTC, hypercalcemia to 13, lactate of 10. SICU consulted for ongoing management of hypercalcemia and lactic acidosis in setting of sepsis, possible ascending cholangitis, and stage IV metastatic pancreatic CA.     NEURO: s/p collapse (fall) on 10/7 due to "weak legs", no LOC, presently A&O x 4  - no pain at this time  - monitor mental status    RESPIRATORY: on 4L of NC  - Monitor SpO2 >92%  - no acute issues  - continuous pulse ox  - OOBTC/IS    CARDIOVASCULAR:  - Monitor hemodynamics  - trend lactate     GI/NUTRITION: s/p Whipple, stage 4 pancreatic cancer  - NPO/NGT/IVF  - Biliary drain to gravity  - Will need to get Central line placed to start TPN     GENITOURINARY/RENAL: hypercalcemia/hypokalemia following collapse, hypercalcemia 2/2 progression of disease  - Plasmalyte @150 with switch to 1/2NS +D5 @150 as per Nephro  - BMP q6  - Electrolyte repletions PRN   - Monitor I/Os  - F/u PTH, PTH related peptide, Vit D 1,25-OH and Vit D 25-OH, ionized calcium  - S/p Calcitonin 4 mg/kg q12, per nephro    HEMATOLOGIC:  - monitor H&H  - 40 mg lovenox daily    INFECTIOUS DISEASE: s/p placement of biliary drain (9/28) w/ concern for ascending cholangitis  - 9/28 BCx E.coli+, BCx 10/1 NGTD x2  - Biliary cx e.coli and Klebsiella variicola + 9/27  - Continue zosyn (10/6 - )  - Trend wbc count, fever curve    ENDOCRINE:  - monitor glucose    Lines:  biliary drain  PIV  NGT    DISPO: SICU

## 2021-10-09 NOTE — PROGRESS NOTE ADULT - ATTENDING COMMENTS
Palliative care input regarding disease progression and goals of care is needed  Hypercalcemia should correct given medical regimen - nephrology recs appreciated  Perfusing but likely under a huge catabolic burden - TPN consult - will need central line/PICC   Primary team aware    Richard Mosley MD  Acute and Critical Care Surgery    The Acute Care Surgery (B Team) Attending Group Practice:  Dr. Adriano Collado, Dr. Diallo Garcia, Dr. Richard Mosley,  Dr. Delvis Rebolledo and Dr. Chiquita Guevara     Urgent issues - spectra [phone] or [phone]  Nonurgent issues - [phone]  Patient appointments or after hours - [phone]

## 2021-10-09 NOTE — PROGRESS NOTE ADULT - SUBJECTIVE AND OBJECTIVE BOX
SICU Daily Progress Note  =====================================================  24 Hour Interval/Overnight Events:      - Plasmalyte 1L x2, Lasix 20 x 2  - Bisphosphonate x1  - Calcitonin x2  -      66M w/PMHx of HTN, GERD, and recently diagnosed pancreatic mass, s/p robot converted to open Whipple on 9/6 who was transferred from [facility] to [facility] due to worsening nausea and vomiting over the last 5 days. Pt endorses a worsening in appetite and has had decreased PO intake over the last week 2/2 to fear of vomiting. Pt notes emesis to be bilious w/o evidence of blood. Pt denies abdominal and chest pain, fevers, chills, SOB, dizziness. Patient was afebrile, hemodynamically stable on presentation. Labs significant for elevated LFTs (T.bili 7.2,  / , AlkPhos 267). CT AP at [facility] demonstrated extensive metastatic disease in the jude hepatis, celiac axis, and peritoneal cavity with likely liver metastases and severe intrahepatic biliary dilatation.       --------------------------------------------------------------------------------------  Allergies: hydrALAZINE (Vomiting)  hydrALAZINE (Vomiting; Nausea)  No Known Allergies      MEDICATIONS:   --------------------------------------------------------------------------------------  Neurologic Medications    Respiratory Medications    Cardiovascular Medications  cloNIDine 0.1 milliGRAM(s) Oral two times a day    Gastrointestinal Medications  multiple electrolytes Injection Type 1 1000 milliLiter(s) IV Continuous <Continuous>    Genitourinary Medications    Hematologic/Oncologic Medications  enoxaparin Injectable 40 milliGRAM(s) SubCutaneous daily    Antimicrobial/Immunologic Medications  piperacillin/tazobactam IVPB.. 3.375 Gram(s) IV Intermittent every 8 hours    Endocrine/Metabolic Medications  calcitonin Injectable 290 International Unit(s) IntraMuscular every 12 hours    Topical/Other Medications    --------------------------------------------------------------------------------------    VITAL SIGNS, INS/OUTS (last 24 hours):  --------------------------------------------------------------------------------------  T(C): 36 (10-09-21 @ 00:00), Max: 37.3 (10-08-21 @ 08:00)  HR: 119 (10-09-21 @ 01:00) (99 - 124)  BP: 127/62 (10-09-21 @ 01:00) (108/65 - 156/78)  BP(mean): 76 (10-09-21 @ 01:00) (66 - 112)  ABP: --  ABP(mean): --  RR: 20 (10-09-21 @ 01:00) (20 - 29)  SpO2: 96% (10-09-21 @ 01:00) (92% - 100%)  Wt(kg): --  CVP(mm Hg): --  CI: --  CAPILLARY BLOOD GLUCOSE       N/A      10-07 @ 07:01  -  10-08 @ 07:00  --------------------------------------------------------  IN:    IV PiggyBack: 200 mL    sodium chloride 0.9%: 1500 mL    Sodium Chloride 0.9% Bolus: 2500 mL  Total IN: 4200 mL    OUT:    Drain (mL): 320 mL    Nasogastric/Oral tube (mL): 600 mL    Voided (mL): 1800 mL  Total OUT: 2720 mL    Total NET: 1480 mL      10-08 @ 07:01  -  10-09 @ 01:37  --------------------------------------------------------  IN:    Instillation (mL): 5 mL    IV PiggyBack: 1300 mL    multiple electrolytes Injection Type 1 Bolus: 2000 mL    multiple electrolytes Injection Type 1.: 2400 mL  Total IN: 5705 mL    OUT:    Drain (mL): 52 mL    Instillation (mL): 5 mL    Nasogastric/Oral tube (mL): 0 mL    Voided (mL): 3490 mL  Total OUT: 3547 mL    Total NET: 2158 mL        --------------------------------------------------------------------------------------  EXAM  NEUROLOGY  Exam: Normal, NAD, alert, oriented x3, no focal deficits.    HEENT  Exam: Normocephalic, atraumatic, EOMI.     RESPIRATORY  Exam: Lungs clear to auscultation, Normal expansion/effort.    CARDIOVASCULAR  Exam: S1, S2.  Regular rate and rhythm.      GI/NUTRITION  Exam: Soft, nontender, nondistended. Well healed midline scar. Biliary drain patent with bilious output. NGT clear output   Current Diet: NPO      METABOLIC/FLUIDS/ELECTROLYTES  multiple electrolytes Injection Type 1 1000 milliLiter(s) IV Continuous <Continuous>      HEMATOLOGIC  [x] VTE Prophylaxis: enoxaparin Injectable 40 milliGRAM(s) SubCutaneous daily      LABS  --------------------------------------------------------------------------------------  CBC (10-08 @ 00:56)                          8.1<L>                   30.24<H>  )--------------(  596<H>     --    % Neuts, --    % Lymphs, ANC: --                              24.9<L>  CBC (10-07 @ 16:57)                          7.8<L>                   31.95<H>  )--------------(  608<H>     94.7<H>% Neuts, 0.9<L>% Lymphs, ANC: 30.26<H>                          24.4<L>    BMP (10-08 @ 22:13)       144     |  103     |  16    			Ca++ --      Ca 12.3<H>       ---------------------------------( 93    		Mg 2.20         3.8     |  19<L>   |  0.67  			Ph 3.0     BMP (10-08 @ 14:48)       146<H>  |  103     |  16    			Ca++ --      Ca 12.6<H>       ---------------------------------( 101<H>		Mg 2.70<H>       3.3<L>  |  19<L>   |  0.72  			Ph 2.4<L>    LFTs (10-08 @ 09:44)      TPro 5.4<L> / Alb 2.4<L> / TBili 6.1<H> / DBili 3.2<H> / <H> / ALT 93<H> / AlkPhos 111  LFTs (10-08 @ 06:55)      TPro 5.6<L> / Alb 2.4<L> / TBili 6.4<H> / DBili 4.7<H> / <H> / ALT 91<H> / AlkPhos 109    Coags (10-07 @ 16:57)  aPTT 23.7<L> / INR 1.38<H> / PT 15.5<H>      ABG (10-08 @ 22:13)      /  /  /  /  / %     Lactate:  12.0<HH>  ABG (10-08 @ 06:51)      /  /  /  /  / %     Lactate:  8.7<HH>        -> .Blood Blood-Peripheral Culture (10-07 @ 14:10)     NG    NG    No growth to date.    -> .Blood Blood-Venous Culture (10-07 @ 12:45)     NG    NG    No growth to date.    -> .Blood Blood Culture (10-01 @ 19:04)     NG    NG    No Growth Final    -> .Blood Blood Culture (10-01 @ 18:55)     NG    NG    No Growth Final    -> .Blood Blood Culture (09-29 @ 18:30)       Growth in aerobic bottle: Gram Negative Rods    Escherichia coli    Growth in aerobic bottle: Escherichia coli    -> .Blood Blood Culture (09-29 @ 17:57)       Growth in aerobic bottle: Gram Negative Rods    NG    Growth in aerobic bottle: Escherichia coli  See previous culture [id number]    -> .Body Fluid DRAINAGE BILLARY Culture (09-29 @ 11:01)     NG    NG    No growth      --------------------------------------------------------------------------------------   SICU Daily Progress Note  =====================================================  24 Hour Interval/Overnight Events:      - Plasmalyte 1L x2, Lasix 20 x 2  - Bisphosphonate x1  - Calcitonin x2  -      66M w/PMHx of HTN, GERD, and recently diagnosed pancreatic mass, s/p robot converted to open Whipple on 9/6 who was transferred from [facility] to [facility] due to worsening nausea and vomiting over the last 5 days. Pt endorses a worsening in appetite and has had decreased PO intake over the last week 2/2 to fear of vomiting. Pt notes emesis to be bilious w/o evidence of blood. Pt denies abdominal and chest pain, fevers, chills, SOB, dizziness. Patient was afebrile, hemodynamically stable on presentation. Labs significant for elevated LFTs (T.bili 7.2,  / , AlkPhos 267). CT AP at [facility] demonstrated extensive metastatic disease in the jude hepatis, celiac axis, and peritoneal cavity with likely liver metastases and severe intrahepatic biliary dilatation.       --------------------------------------------------------------------------------------  Allergies: hydrALAZINE (Vomiting)  hydrALAZINE (Vomiting; Nausea)  No Known Allergies      MEDICATIONS:   --------------------------------------------------------------------------------------  Neurologic Medications    Respiratory Medications    Cardiovascular Medications  cloNIDine 0.1 milliGRAM(s) Oral two times a day    Gastrointestinal Medications  multiple electrolytes Injection Type 1 1000 milliLiter(s) IV Continuous <Continuous>    Genitourinary Medications    Hematologic/Oncologic Medications  enoxaparin Injectable 40 milliGRAM(s) SubCutaneous daily    Antimicrobial/Immunologic Medications  piperacillin/tazobactam IVPB.. 3.375 Gram(s) IV Intermittent every 8 hours    Endocrine/Metabolic Medications  calcitonin Injectable 290 International Unit(s) IntraMuscular every 12 hours    Topical/Other Medications    --------------------------------------------------------------------------------------    VITAL SIGNS, INS/OUTS (last 24 hours):  --------------------------------------------------------------------------------------  ICU Vital Signs Last 24 Hrs  T(C): 36.1 (09 Oct 2021 12:00), Max: 37.2 (08 Oct 2021 16:00)  T(F): 97 (09 Oct 2021 12:00), Max: 98.9 (08 Oct 2021 16:00)  HR: 126 (09 Oct 2021 13:00) (114 - 126)  BP: 136/72 (09 Oct 2021 13:00) (108/65 - 141/83)  BP(mean): 86 (09 Oct 2021 13:00) (69 - 112)  ABP: --  ABP(mean): --  RR: 19 (09 Oct 2021 13:00) (19 - 27)  SpO2: 96% (09 Oct 2021 13:00) (90% - 100%)        10-07 @ 07:01  -  10-08 @ 07:00  --------------------------------------------------------  IN:    IV PiggyBack: 200 mL    sodium chloride 0.9%: 1500 mL    Sodium Chloride 0.9% Bolus: 2500 mL  Total IN: 4200 mL    OUT:    Drain (mL): 320 mL    Nasogastric/Oral tube (mL): 600 mL    Voided (mL): 1800 mL  Total OUT: 2720 mL    Total NET: 1480 mL      10-08 @ 07:01  -  10-09 @ 01:37  --------------------------------------------------------  IN:    Instillation (mL): 5 mL    IV PiggyBack: 1300 mL    multiple electrolytes Injection Type 1 Bolus: 2000 mL    multiple electrolytes Injection Type 1.: 2400 mL  Total IN: 5705 mL    OUT:    Drain (mL): 52 mL    Instillation (mL): 5 mL    Nasogastric/Oral tube (mL): 0 mL    Voided (mL): 3490 mL  Total OUT: 3547 mL    Total NET: 2158 mL        --------------------------------------------------------------------------------------  EXAM  NEUROLOGY  Exam: Normal, NAD, alert, oriented x3, no focal deficits.    HEENT  Exam: Normocephalic, atraumatic, EOMI.     RESPIRATORY  Exam: Lungs clear to auscultation, Normal expansion/effort.    CARDIOVASCULAR  Exam: S1, S2.  Regular rate and rhythm.      GI/NUTRITION  Exam: Soft, nontender, nondistended. Well healed midline scar. Biliary drain patent with bilious output. NGT clear output   Current Diet: NPO      METABOLIC/FLUIDS/ELECTROLYTES  multiple electrolytes Injection Type 1 1000 milliLiter(s) IV Continuous <Continuous>      HEMATOLOGIC  [x] VTE Prophylaxis: enoxaparin Injectable 40 milliGRAM(s) SubCutaneous daily      LABS  --------------------------------------------------------------------------------------                          7.9    25.31 )-----------( 562      ( 09 Oct 2021 03:42 )             24.3       10-09    145  |  104  |  18  ----------------------------<  84  3.4<L>   |  22  |  0.62    Ca    13.2<HH>      09 Oct 2021 10:14  Phos  2.2     10-09  Mg     2.00     10-09    TPro  5.4<L>  /  Alb  2.4<L>  /  TBili  7.4<H>  /  DBili  x   /  AST  246<H>  /  ALT  114<H>  /  AlkPhos  117  10-09          ABG - ( 07 Oct 2021 16:57 )  pH, Arterial: 7.41  pH, Blood: x     /  pCO2: 32    /  pO2: 92    / HCO3: 20    / Base Excess: -3.8  /  SaO2: 98.0        PT/INR - ( 07 Oct 2021 16:57 )   PT: 15.5 sec;   INR: 1.38 ratio         PTT - ( 07 Oct 2021 16:57 )  PTT:23.7 sec          CAPILLARY BLOOD GLUCOSE                  --------------------------------------------------------------------------------------
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
## 2021-10-09 NOTE — CONSULT NOTE ADULT - REASON FOR ADMISSION
Nausea and Emesis

## 2021-10-09 NOTE — CONSULT NOTE ADULT - PROVIDER SPECIALTY LIST ADULT
Intervent Radiology
Heme/Onc
Infectious Disease
Nutrition Support
Intervent Radiology
Gastroenterology
Nephrology
SICU
Palliative Care

## 2021-10-09 NOTE — CONSULT NOTE ADULT - CONSULT REASON
Biliary obstruction
Biopsy to confirm metastatic disease.
Collapsed while being transported to IR for biliary stent placement on 10/7; tachycardic to 150's
Pancreatic cancer
e.coli bacteremia
Hypercalcemia
TPN
Biliary obstruction, fever.
goals of care

## 2021-10-09 NOTE — PROGRESS NOTE ADULT - ASSESSMENT
Assessment:          Plan: 66 M with Hx HTN, GERD, and recently diagnosed pancreatic mass, s/p robot converted to open Whipple on 9/6/21 transferred from [facility] for elevated LFTs, nausea, and emesis, found to have extensive metastatic disease in post-surgical site, likely due to rapid recurrence, with c/f biliary obstruction; now s/p biliary drain placement 9/28 2/2 signs & symptoms cholangitis. Transferred to SICU 10/7 for symptomatic hypercalcemia & hemodynamic instability.    Plan:  - F/u PM labs, blood cultures, EKG  - Pain control PRN  - Continue compazine and baclofen  - Continue home meds as ordered  - Regular diet  - Appreciate heme/onc recs    - Calcitonin for hypercalcemia, may be causing his emesis  - Monitor leukocytosis; Appreciate ID recommendations, currently on Unasyn, will transition to Cipro/Flagyl on discharge; most recent blood cultures 10/2 NGTD  - PT consult  - DVT ppx: LVX  - Appreciate SICU care  - Healthcare proxy obtained    D Team Surgery  [id number]

## 2021-10-10 NOTE — PROGRESS NOTE ADULT - ATTENDING COMMENTS
malignancy related hypercalcemia who received the above noted management.  Serum calcium starting to improve today.  Continue with current regimen.  Reviewed with ICU team.

## 2021-10-10 NOTE — PROGRESS NOTE ADULT - ATTENDING COMMENTS
Palliative care input regarding disease progression and goals of care this week  Hypercalcemia should correct given medical regimen - trending down - nephrology recs appreciated  Likely under a huge catabolic burden - TPN running   Primary team aware   Tachycardia likely due to relative hypovolemia - will allow TPN to catch up via volume    Richard Mosley MD  Acute and Critical Care Surgery    The Acute Care Surgery (B Team) Attending Group Practice:  Dr. Adriano Collado, Dr. Diallo Garcia, Dr. Richard Mosley,  Dr. Delvis Rebolledo and Dr. Chiquita Guevara     Urgent issues - spectra [phone] or [id number]  Nonurgent issues - [phone]  Patient appointments or after hours - [phone]

## 2021-10-10 NOTE — PROGRESS NOTE ADULT - SUBJECTIVE AND OBJECTIVE BOX
Vascular Surgery Progress Note    Overnight:     Subjective:   Patient seen at bedside this AM.     Objective:  Vital Signs  T(C): 36 (10-09 @ 20:00), Max: 36.7 (10-09 @ 04:00)  HR: 124 (10-09 @ 23:00) (116 - 128)  BP: 134/81 (10-09 @ 23:00) (119/65 - 154/72)  RR: 20 (10-09 @ 23:00) (19 - 31)  SpO2: 98% (10-09 @ 23:00) (90% - 100%)  10-08-21 @ 07:01  -  10-09-21 @ 07:00  --------------------------------------------------------  IN:  Total IN: 0 mL    OUT:    Drain (mL): 112 mL    Nasogastric/Oral tube (mL): 0 mL    Voided (mL): 3940 mL  Total OUT: 4052 mL    Total NET: -4052 mL      10-09-21 @ 07:01  -  10-10-21 @ 00:11  --------------------------------------------------------  IN:  Total IN: 0 mL    OUT:    Drain (mL): 75 mL    Nasogastric/Oral tube (mL): 150 mL    Voided (mL): 1000 mL  Total OUT: 1225 mL    Total NET: -1225 mL          Physical Exam:  GEN:   RESP:   ABD:   EXTR:   NEURO:     Labs:                        7.9    25.31 )-----------( 562      ( 09 Oct 2021 03:42 )             24.3   10-09    147<H>  |  107  |  19  ----------------------------<  145<H>  3.2<L>   |  23  |  0.59    Ca    13.0<HH>      09 Oct 2021 21:38  Phos  1.0     10-09  Mg     2.00     10-09    TPro  5.4<L>  /  Alb  2.4<L>  /  TBili  7.4<H>  /  DBili  x   /  AST  246<H>  /  ALT  114<H>  /  AlkPhos  117  10-09    CAPILLARY BLOOD GLUCOSE          Medications:   MEDICATIONS  (STANDING):  chlorhexidine 4% Liquid 1 Application(s) Topical <User Schedule>  enoxaparin Injectable 40 milliGRAM(s) SubCutaneous daily  fat emulsion (Fish Oil and Plant Based) 20% Infusion 8.3 mL/Hr (8.3 mL/Hr) IV Continuous <Continuous>  Parenteral Nutrition - Adult 1 Each (42 mL/Hr) TPN Continuous <Continuous>  piperacillin/tazobactam IVPB.. 3.375 Gram(s) IV Intermittent every 8 hours  potassium phosphate IVPB 30 milliMole(s) IV Intermittent once  sodium chloride 0.45%. 1000 milliLiter(s) (100 mL/Hr) IV Continuous <Continuous>    MEDICATIONS  (PRN):  sodium chloride 0.9% lock flush 10 milliLiter(s) IV Push every 1 hour PRN Pre/post blood products, medications, blood draw, and to maintain line patency      Imaging:     Surgery D Progress Note    Overnight:   - IVF switched to 1/2NS @100  - L IJ placed for TPN initiation     Subjective:   Patient seen at bedside this AM.     Objective:  Vital Signs  T(C): 36 (10-09 @ 20:00), Max: 36.7 (10-09 @ 04:00)  HR: 124 (10-09 @ 23:00) (116 - 128)  BP: 134/81 (10-09 @ 23:00) (119/65 - 154/72)  RR: 20 (10-09 @ 23:00) (19 - 31)  SpO2: 98% (10-09 @ 23:00) (90% - 100%)  10-08-21 @ 07:01  -  10-09-21 @ 07:00  --------------------------------------------------------  IN:  Total IN: 0 mL    OUT:    Drain (mL): 112 mL    Nasogastric/Oral tube (mL): 0 mL    Voided (mL): 3940 mL  Total OUT: 4052 mL    Total NET: -4052 mL      10-09-21 @ 07:01  -  10-10-21 @ 00:11  --------------------------------------------------------  IN:  Total IN: 0 mL    OUT:    Drain (mL): 75 mL    Nasogastric/Oral tube (mL): 150 mL    Voided (mL): 1000 mL  Total OUT: 1225 mL    Total NET: -1225 mL          Physical Exam:  GEN:   RESP:   ABD:   EXTR:   NEURO:     Labs:                        7.9    25.31 )-----------( 562      ( 09 Oct 2021 03:42 )             24.3   10-09    147<H>  |  107  |  19  ----------------------------<  145<H>  3.2<L>   |  23  |  0.59    Ca    13.0<HH>      09 Oct 2021 21:38  Phos  1.0     10-09  Mg     2.00     10-09    TPro  5.4<L>  /  Alb  2.4<L>  /  TBili  7.4<H>  /  DBili  x   /  AST  246<H>  /  ALT  114<H>  /  AlkPhos  117  10-09    CAPILLARY BLOOD GLUCOSE          Medications:   MEDICATIONS  (STANDING):  chlorhexidine 4% Liquid 1 Application(s) Topical <User Schedule>  enoxaparin Injectable 40 milliGRAM(s) SubCutaneous daily  fat emulsion (Fish Oil and Plant Based) 20% Infusion 8.3 mL/Hr (8.3 mL/Hr) IV Continuous <Continuous>  Parenteral Nutrition - Adult 1 Each (42 mL/Hr) TPN Continuous <Continuous>  piperacillin/tazobactam IVPB.. 3.375 Gram(s) IV Intermittent every 8 hours  potassium phosphate IVPB 30 milliMole(s) IV Intermittent once  sodium chloride 0.45%. 1000 milliLiter(s) (100 mL/Hr) IV Continuous <Continuous>    MEDICATIONS  (PRN):  sodium chloride 0.9% lock flush 10 milliLiter(s) IV Push every 1 hour PRN Pre/post blood products, medications, blood draw, and to maintain line patency      Imaging:       Surgery D Progress Note    Overnight:   - IVF switched to 1/2NS @100  - L IJ placed for TPN initiation     Subjective:   Patient seen at bedside this AM. No complaints at this time, but continues to hiccup. Passing flatus, has not had BM in two days.    Objective:  Vital Signs  T(C): 36 (10-09 @ 20:00), Max: 36.7 (10-09 @ 04:00)  HR: 124 (10-09 @ 23:00) (116 - 128)  BP: 134/81 (10-09 @ 23:00) (119/65 - 154/72)  RR: 20 (10-09 @ 23:00) (19 - 31)  SpO2: 98% (10-09 @ 23:00) (90% - 100%)  10-08-21 @ 07:01  -  10-09-21 @ 07:00  --------------------------------------------------------  IN:  Total IN: 0 mL    OUT:    Drain (mL): 112 mL    Nasogastric/Oral tube (mL): 0 mL    Voided (mL): 3940 mL  Total OUT: 4052 mL    Total NET: -4052 mL      10-09-21 @ 07:01  -  10-10-21 @ 00:11  --------------------------------------------------------  IN:  Total IN: 0 mL    OUT:    Drain (mL): 75 mL    Nasogastric/Oral tube (mL): 150 mL    Voided (mL): 1000 mL  Total OUT: 1225 mL    Total NET: -1225 mL    PHYSICAL EXAM:  Constitutional: A&Ox3, NAD  Respiratory: Unlabored breathing  Abdomen: Soft, nondistended, NTTP. No rebound or guarding. Well healed midline scar. Biliary drain patent with bilious output.  Extremities: WWP, RICHTER spontaneously    Labs:                        7.9    25.31 )-----------( 562      ( 09 Oct 2021 03:42 )             24.3   10-09    147<H>  |  107  |  19  ----------------------------<  145<H>  3.2<L>   |  23  |  0.59    Ca    13.0<HH>      09 Oct 2021 21:38  Phos  1.0     10-09  Mg     2.00     10-09    TPro  5.4<L>  /  Alb  2.4<L>  /  TBili  7.4<H>  /  DBili  x   /  AST  246<H>  /  ALT  114<H>  /  AlkPhos  117  10-09    CAPILLARY BLOOD GLUCOSE          Medications:   MEDICATIONS  (STANDING):  chlorhexidine 4% Liquid 1 Application(s) Topical <User Schedule>  enoxaparin Injectable 40 milliGRAM(s) SubCutaneous daily  fat emulsion (Fish Oil and Plant Based) 20% Infusion 8.3 mL/Hr (8.3 mL/Hr) IV Continuous <Continuous>  Parenteral Nutrition - Adult 1 Each (42 mL/Hr) TPN Continuous <Continuous>  piperacillin/tazobactam IVPB.. 3.375 Gram(s) IV Intermittent every 8 hours  potassium phosphate IVPB 30 milliMole(s) IV Intermittent once  sodium chloride 0.45%. 1000 milliLiter(s) (100 mL/Hr) IV Continuous <Continuous>    MEDICATIONS  (PRN):  sodium chloride 0.9% lock flush 10 milliLiter(s) IV Push every 1 hour PRN Pre/post blood products, medications, blood draw, and to maintain line patency

## 2021-10-10 NOTE — PROGRESS NOTE ADULT - ASSESSMENT
Assessment:         PLAN (to be discussed with attending in AM):  -  -  -  -  - · Assessment      66 M with Hx HTN, GERD, and recently diagnosed pancreatic mass, s/p robot converted to open Whipple on 9/6/21 transferred from [facility] for elevated LFTs, nausea, and emesis, found to have extensive metastatic disease in post-surgical site, likely due to rapid recurrence, with c/f biliary obstruction; now s/p biliary drain placement 9/28 2/2 signs & symptoms cholangitis. Transferred to SICU 10/7 for symptomatic hypercalcemia & hemodynamic instability.    Plan:  - F/u AM labs  - Pain control PRN  - Continue compazine and baclofen  - Continue home meds as ordered  - Regular diet  - Appreciate heme/onc recs    - Calcitonin / bisphosphonate for hypercalcemia, may be causing his emesis  - Monitor leukocytosis; Appreciate ID recommendations, currently on Zosyn, will transition to Cipro/Flagyl on discharge; most recent blood cultures 10/2 NGTD  - PT consult  - DVT ppx: LVX  - Appreciate SICU care  - Healthcare proxy obtained    D Team Surgery  [id number]

## 2021-10-10 NOTE — CHART NOTE - NSCHARTNOTEFT_GEN_A_CORE
Spoke at length with patient's healthcare proxy, Regina, regarding Mr. Pederson's declining mental status today and inability to make decisions regarding his care. She is aware she will likely be called upon to make decisions moving forward, and is planning to have a goals of care discussion with SICU team & palliative team. Regina will come to the hospital ~10AM Monday 10/10. At this time, Regina verbally stated she and Mr. Pederson discussed he wants life sustaining measures for up to two weeks, and is full code. Further decisions on care pending conversation on Monday. Palliative team contacted this AM who will see patient on Monday.    SICU team made aware regarding this discussion, and will reach out to palliative care team as well to be present for GOC discussion.    Plan:  - F/u GOC discussion 10AM Mon. 10/10  - Discuss MOLST form / care decisions moving forward Spoke at length with patient's healthcare proxy, Regina, regarding Mr. Pederson's declining mental status today and inability to make decisions regarding his care. She is aware she will likely be called upon to make decisions moving forward, and is planning to have a goals of care discussion with SICU team & palliative team. Regina will come to the hospital ~10AM Monday 10/10. At this time, Regina verbally stated she and Mr. Pederson discussed he wants life sustaining measures for up to two weeks, and is full code. She also verbally stated although she believes he is a "fighter" and she herself would prefer comfort measures only for him, she will defer to his stated wishes prior to his declining mental status for now. Further decisions on care pending conversation on Monday. Palliative team contacted this AM who will see patient on Monday.    SICU team made aware regarding this discussion, and will reach out to palliative care team as well to be present for GOC discussion.    Plan:  - F/u GOC discussion 10AM Mon. 10/10  - Discuss MOLST form / care decisions moving forward

## 2021-10-10 NOTE — PROGRESS NOTE ADULT - SUBJECTIVE AND OBJECTIVE BOX
Health system Division of Kidney Diseases & Hypertension  FOLLOW UP NOTE  [phone]--------------------------------------------------------------------------------    HPI : 66 year old male with HTN and recently diagnosed pancreatic mass, s/p Whipple on 9/6 who was transferred from [facility] to [facility] due to worsening nausea and vomiting over the last 5 days. Patient was afebrile, hemodynamically stable on presentation. Labs significant for elevated LFTs (T.bili 7.2,  / , AlkPhos 267). CT AP at [facility] demonstrated extensive metastatic disease in the jude hepatis, celiac axis, and peritoneal cavity with likely liver metastases and severe intrahepatic biliary dilatation.  Nephrology consulted for evaluation of worsening hypercalcemia in setting of progressive metastatic disease.     Patient was seen and examined at bedside. Pt. states he feels slightly better today. Pt. s/p Calcitonin and Pamidronate on 10/8/21. Pt. remains on IV hydration.    PAST HISTORY  --------------------------------------------------------------------------------  No significant changes to PMH, PSH, FHx, SHx, unless otherwise noted    ALLERGIES & MEDICATIONS  --------------------------------------------------------------------------------  Allergies    No Known Allergies    Intolerances    hydrALAZINE (Vomiting)  hydrALAZINE (Vomiting; Nausea)    Standing Inpatient Medications  chlorhexidine 4% Liquid 1 Application(s) Topical <User Schedule>  enoxaparin Injectable 40 milliGRAM(s) SubCutaneous daily  fat emulsion (Fish Oil and Plant Based) 20% Infusion 15.8 mL/Hr IV Continuous <Continuous>  Parenteral Nutrition - Adult 1 Each TPN Continuous <Continuous>  Parenteral Nutrition - Adult 1 Each TPN Continuous <Continuous>  piperacillin/tazobactam IVPB.. 3.375 Gram(s) IV Intermittent every 8 hours  sodium chloride 0.45%. 1000 milliLiter(s) IV Continuous <Continuous>    VITALS/PHYSICAL EXAM  --------------------------------------------------------------------------------  T(C): 36.1 (10-10-21 @ 08:00), Max: 36.2 (10-09-21 @ 16:00)  HR: 117 (10-10-21 @ 11:00) (117 - 132)  BP: 107/92 (10-10-21 @ 11:00) (107/92 - 171/120)  RR: 27 (10-10-21 @ 11:00) (15 - 35)  SpO2: 100% (10-10-21 @ 11:00) (93% - 100%)  Wt(kg): --    10-09-21 @ 07:01  -  10-10-21 @ 07:00  --------------------------------------------------------  IN: 4561.6 mL / OUT: 2510 mL / NET: 2051.6 mL    10-10-21 @ 07:01  -  10-10-21 @ 11:35  --------------------------------------------------------  IN: 597.9 mL / OUT: 155 mL / NET: 442.9 mL    Physical Exam:              Gen: NAD  	HEENT: NGT  	Pulm: CTA B/L, no crackles   	CV: S1S2 tachy  	Abd: Soft, +BS  drain noted  	Ext: No LE edema B/L  	Neuro: Awake  	Skin: Warm and dry    LABS/STUDIES  --------------------------------------------------------------------------------              7.5    21.15 >-----------<  471      [10-10-21 @ 01:21]              23.6     141  |  105  |  20  ----------------------------<  155      [10-10-21 @ 07:17]  3.7   |  25  |  0.62        Ca     12.4     [10-10-21 @ 07:17]      iCa    1.83     [10-10 @ 01:21]      Mg     1.80     [10-10-21 @ 07:17]      Phos  2.3     [10-10-21 @ 07:17]    TPro  4.9  /  Alb  2.2  /  TBili  8.6  /  DBili  x   /  AST  220  /  ALT  115  /  AlkPhos  121  [10-10-21 @ 01:21]    Creatinine Trend:  SCr 0.62 [10-10 @ 07:17]  SCr 0.65 [10-10 @ 01:21]  SCr 0.59 [10-09 @ 21:38]  SCr 0.61 [10-09 @ 16:03]  SCr 0.62 [10-09 @ 10:14]    PTH -- (Ca 12.4)      [10-08-21 @ 09:40]   10  PTH -- (Ca --)      [10-07-21 @ 12:10]   9  Vitamin D (25OH) 14.8      [10-08-21 @ 13:14]

## 2021-10-10 NOTE — PROGRESS NOTE ADULT - ASSESSMENT
66M PMHx of HTN, GERD, recently diagnosed pancreatic mass, now s/p robotic converted to open Whipple on 8/6 who was transferred from [facility] to [facility] due to worsening nausea and vomiting over the last 5 days and elevated LFT's, found to have extensive metastatic disease with likely liver metastases and severe intrahepatic biliary dilatation, now s/p biliary drain placement on 9/28 2/2 signs suggestive of cholangitis and IR biopsy proven liver metastasis on 10/4. Patient was being transported to IR for biliary stent on 10/7 when he collapsed with tachycardia and prolonged QTC, hypercalcemia to 13, lactate of 10. SICU consulted for ongoing management of hypercalcemia and lactic acidosis in setting of sepsis, possible ascending cholangitis, and stage IV metastatic pancreatic CA.    Plan:  NEURO: s/p collapse (fall) on 10/7 due to "weak legs", no LOC, presently A&O x 4  - no pain at this time  - monitor mental status    RESPIRATORY: on 4L of NC  - Monitor SpO2 >92%  - no acute issues  - continuous pulse ox  - OOBTC/IS    CARDIOVASCULAR:  - Monitor hemodynamics  - trend lactate     GI/NUTRITION: s/p Whipple, stage 4 pancreatic cancer  - NPO/NGT/TPN  - Biliary drain to gravity    GENITOURINARY/RENAL: hypercalcemia/hypokalemia following collapse, hypercalcemia 2/2 progression of disease  - 1/2NS +D5 @100   - BMP q6  - Electrolyte repletions PRN   - Monitor I/Os  - F/u PTH, PTH related peptide, Vit D 1,25-OH and Vit D 25-OH, ionized calcium  - S/p Calcitonin 4 mg/kg q12 x 2, per nephro    HEMATOLOGIC:  - monitor H&H  - 40 mg lovenox daily    INFECTIOUS DISEASE: s/p placement of biliary drain (9/28) w/ concern for ascending cholangitis  - 9/28 BCx E.coli+, BCx 10/1 NGTD x2  - Biliary cx e.coli and Klebsiella variicola + 9/27  - Continue zosyn (10/6 - )  - Trend wbc count, fever curve    ENDOCRINE:  - monitor glucose    Lines:  biliary drain  PIV  NGT  L IJ     DISPO: SICU       66M PMHx of HTN, GERD, recently diagnosed pancreatic mass, now s/p robotic converted to open Whipple on 8/6 who was transferred from [facility] to [facility] due to worsening nausea and vomiting over the last 5 days and elevated LFT's, found to have extensive metastatic disease with likely liver metastases and severe intrahepatic biliary dilatation, now s/p biliary drain placement on 9/28 2/2 signs suggestive of cholangitis and IR biopsy proven liver metastasis on 10/4. Patient was being transported to IR for biliary stent on 10/7 when he collapsed with tachycardia and prolonged QTC, hypercalcemia to 13, lactate of 10. SICU consulted for ongoing management of hypercalcemia and lactic acidosis in setting of sepsis, possible ascending cholangitis, and stage IV metastatic pancreatic CA.    Plan:  NEURO: s/p collapse (fall) on 10/7 due to "weak legs", no LOC, presently A&O x 4  - no pain at this time  - monitor mental status    RESPIRATORY: on 4L of NC  - Monitor SpO2 >92%  - continuous pulse ox  - OOBTC/IS  - PT seen, recs home w/ no PT needs    CARDIOVASCULAR:  - Monitor hemodynamics  - trend lactate     GI/NUTRITION: s/p Whipple, stage 4 pancreatic cancer  - NPO/TPN Left IJ  - Biliary drain to gravity  - pos BM yest.    GENITOURINARY/RENAL: hypercalcemia/hypokalemia following collapse, hypercalcemia 2/2 progression of disease  - 1/2NS @100 cc/hr  - BMP q6  - Electrolyte repletions PRN   - Monitor I/Os  - F/u PTH, PTH related peptide, Vit D 1,25-OH and Vit D 25-OH, ionized calcium  - s/p Calcitonin 4 mg/kg q12 x 2, d/c calcitonin    HEMATOLOGIC:  - monitor H&H  - 40 mg lovenox daily    INFECTIOUS DISEASE: s/p placement of biliary drain (9/28) w/ concern for ascending cholangitis  - 9/28 BCx E.coli+, BCx 10/1 NGTD x2  - Biliary cx e.coli and Klebsiella variicola + 9/27  - Continue zosyn (10/6 - )  - Trend wbc count, fever curve    ENDOCRINE:  - monitor glucose    Lines:  biliary drain  PIV  NGT  L IJ     DISPO: SICU       66M PMHx of HTN, GERD, recently diagnosed pancreatic mass, now s/p robotic converted to open Whipple on 8/6 who was transferred from [facility] to [facility] due to worsening nausea and vomiting over the last 5 days and elevated LFT's, found to have extensive metastatic disease with likely liver metastases and severe intrahepatic biliary dilatation, now s/p biliary drain placement on 9/28 2/2 signs suggestive of cholangitis and IR biopsy proven liver metastasis on 10/4. Patient was being transported to IR for biliary stent on 10/7 when he collapsed with tachycardia and prolonged QTC, hypercalcemia to 13, lactate of 10. SICU consulted for ongoing management of hypercalcemia and lactic acidosis in setting of sepsis, possible ascending cholangitis, and stage IV metastatic pancreatic CA.    Plan:  NEURO: s/p collapse (fall) on 10/7 due to "weak legs", no LOC, presently A&O x 4  - no pain at this time  - monitor mental status    RESPIRATORY: on 4L of NC  - Monitor SpO2 >92%  - continuous pulse ox  - OOBTC/IS  - PT seen, recs home w/ no PT needs    CARDIOVASCULAR:  - Monitor hemodynamics  - trend lactate     GI/NUTRITION: s/p Whipple, stage 4 pancreatic cancer  - NPO/TPN Left IJ  - Biliary drain to gravity  - pos BM yest.    GENITOURINARY/RENAL: hypercalcemia/hypokalemia following collapse, hypercalcemia 2/2 progression of disease  - 1/2NS @100 cc/hr  - BMP q6  - Electrolyte repletions PRN, K repleted o/n  - Monitor I/Os  - F/u PTH, PTH related peptide, Vit D 1,25-OH and Vit D 25-OH, ionized calcium  - s/p Calcitonin 4 mg/kg q12 x 2, d/c calcitonin    HEMATOLOGIC:  - monitor H&H  - 40 mg lovenox daily    INFECTIOUS DISEASE: s/p placement of biliary drain (9/28) w/ concern for ascending cholangitis  - 9/28 BCx E.coli+, BCx 10/1 NGTD x2  - Biliary cx e.coli and Klebsiella variicola + 9/27  - Continue zosyn (10/6 - )  - Trend wbc count, fever curve    ENDOCRINE:  - monitor glucose    Lines:  biliary drain  PIV  NGT  L IJ     DISPO: SICU

## 2021-10-10 NOTE — PROGRESS NOTE ADULT - SUBJECTIVE AND OBJECTIVE BOX
GI CONSULTATION NOTE  Chief Complaint: melena, constipation  History of Present Illness: Patient is a pleasant 61 y/o male with h/o GI Bleed secondary to PUD a few months ago who presents to ED complaining of constipation and black stools.  Patient states he has been constipated on and off since he was hospitalized 2 months ago with recurrent GI bleed.  He was hospitalized in Oct and Nov of last year with bleeding from gastric ulcer.  His last EGD was 11/17 at which time the pre-pyloric channel ulcer was oozing and cauterized.  He did also have EGD/colon 10/17 which revealed the large gastric ulcer, colonoscopy was unremarkable.  He states he was doing well other than he has been constipated for the past several months.  Today he had a BM after 3 days of being constipated and his stool was black which prompted him to come to the ED.  He has since had at least one other black BM.  He states he is having a lot of discomfort in his rectum since having a BM.   In the ED he was found to have a hgb of 6.0 down from 7.6 2 months ago.  He denies any abdominal pain, N/V.  No SOB.  He does admit to some light-headedness and dizzness however he states this has been intermittent for several weeks.    Past Medical History: HTN, PUD, arthritis, ETOH abuse, H. pylori gastritis  Past Surgical History: s/p B hip replacement  Family History: no GI malignancy  Social History: +TOB- smokes 1/2 PPD, +ETOH- 6-8 cans beer per day,   Allergies: NKDA  Patient History: Home Medications  amoxicillin (amoxicillin 500 mg oral capsule) 1,000 mg = 2 cap, PO, Capsule, q12hrS  11/22/2017 15:58  clarithromycin (clarithromycin 500 mg oral tablet) 500 mg = 1 tab, PO, Tablet, q12hrS  11/22/2017 15:58  pantoprazole (Protonix 40 mg oral EC tablet) 40 mg = 1 tab, PO, BID  11/22/2017 15:56  acetaminophen (acetaminophen 325 mg oral tablet) 325 mg = 1 tab, PO, Tablet, q6hr, PRN Pain  11/01/2017 16:08  primidone (primidone 50 mg oral tablet) 100  SICU Daily Progress Note  =====================================================  24 Hour Interval/Overnight Events:      - IVF switched to 1/2NS @100  - L IJ placed for TPN initiation      66M w/PMHx of HTN, GERD, and recently diagnosed pancreatic mass, s/p robot converted to open Whipple on 9/6 who was transferred from [facility] to [facility] due to worsening nausea and vomiting over the last 5 days. Pt endorses a worsening in appetite and has had decreased PO intake over the last week 2/2 to fear of vomiting. Pt notes emesis to be bilious w/o evidence of blood. Pt denies abdominal and chest pain, fevers, chills, SOB, dizziness. Patient was afebrile, hemodynamically stable on presentation. Labs significant for elevated LFTs (T.bili 7.2,  / , AlkPhos 267). CT AP at [facility] demonstrated extensive metastatic disease in the jude hepatis, celiac axis, and peritoneal cavity with likely liver metastases and severe intrahepatic biliary dilatation.       --------------------------------------------------------------------------------------  Allergies: hydrALAZINE (Vomiting)  hydrALAZINE (Vomiting; Nausea)  No Known Allergies      MEDICATIONS:   --------------------------------------------------------------------------------------  Neurologic Medications    Respiratory Medications    Cardiovascular Medications    Gastrointestinal Medications  fat emulsion (Fish Oil and Plant Based) 20% Infusion 8.3 mL/Hr IV Continuous <Continuous>  Parenteral Nutrition - Adult 1 Each TPN Continuous <Continuous>  potassium phosphate IVPB 30 milliMole(s) IV Intermittent once  sodium chloride 0.45%. 1000 milliLiter(s) IV Continuous <Continuous>  sodium chloride 0.9% lock flush 10 milliLiter(s) IV Push every 1 hour PRN Pre/post blood products, medications, blood draw, and to maintain line patency    Genitourinary Medications    Hematologic/Oncologic Medications  enoxaparin Injectable 40 milliGRAM(s) SubCutaneous daily    Antimicrobial/Immunologic Medications  piperacillin/tazobactam IVPB.. 3.375 Gram(s) IV Intermittent every 8 hours    Endocrine/Metabolic Medications    Topical/Other Medications  chlorhexidine 4% Liquid 1 Application(s) Topical <User Schedule>    --------------------------------------------------------------------------------------    VITAL SIGNS, INS/OUTS (last 24 hours):  --------------------------------------------------------------------------------------  T(C): 36 (10-09-21 @ 20:00), Max: 36.7 (10-09-21 @ 04:00)  HR: 124 (10-09-21 @ 23:00) (116 - 128)  BP: 134/81 (10-09-21 @ 23:00) (119/65 - 154/72)  BP(mean): 93 (10-09-21 @ 23:00) (77 - 103)  ABP: --  ABP(mean): --  RR: 20 (10-09-21 @ 23:00) (19 - 31)  SpO2: 98% (10-09-21 @ 23:00) (90% - 100%)  Wt(kg): --  CVP(mm Hg): --  CI: --  CAPILLARY BLOOD GLUCOSE      POCT Blood Glucose.: 165 mg/dL (10 Oct 2021 00:47)   N/A      10-08 @ 07:01  -  10-09 @ 07:00  --------------------------------------------------------  IN:    Instillation (mL): 5 mL    IV PiggyBack: 1500 mL    multiple electrolytes Injection Type 1 Bolus: 2000 mL    multiple electrolytes Injection Type 1.: 3150 mL  Total IN: 6655 mL    OUT:    Drain (mL): 112 mL    Instillation (mL): 5 mL    Nasogastric/Oral tube (mL): 0 mL    Voided (mL): 3940 mL  Total OUT: 4057 mL    Total NET: 2598 mL      10-09 @ 07:01  -  10-10 @ 01:08  --------------------------------------------------------  IN:    dextrose 5% + sodium chloride 0.45%: 2100 mL    Fat Emulsion (Fish Oil &amp; Plant Based) 20% Infusion: 33.2 mL    IV PiggyBack: 300 mL    multiple electrolytes Injection Type 1.: 300 mL    Sodium Chloride 0.9% Bolus: 500 mL    TPN (Total Parenteral Nutrition): 126 mL  Total IN: 3359.2 mL    OUT:    Drain (mL): 75 mL    Nasogastric/Oral tube (mL): 150 mL    Voided (mL): 1000 mL  Total OUT: 1225 mL    Total NET: 2134.2 mL        --------------------------------------------------------------------------------------  EXAM  NEUROLOGY  Exam: Normal, NAD, alert, oriented x3, no focal deficits.    HEENT  Exam: Normocephalic, atraumatic, EOMI.     RESPIRATORY  Exam: Lungs clear to auscultation, Normal expansion/effort.    CARDIOVASCULAR  Exam: S1, S2.  Regular rate and rhythm.      GI/NUTRITION  Exam: Abdomen soft, Non-tender, Non-distended.   Current Diet: Diet, NPO  fat emulsion (Fish Oil and Plant Based) 20% Infusion  Parenteral Nutrition - Adult      METABOLIC/FLUIDS/ELECTROLYTES  fat emulsion (Fish Oil and Plant Based) 20% Infusion 8.3 mL/Hr IV Continuous <Continuous>  Parenteral Nutrition - Adult 1 Each TPN Continuous <Continuous>  potassium phosphate IVPB 30 milliMole(s) IV Intermittent once  sodium chloride 0.45%. 1000 milliLiter(s) IV Continuous <Continuous>      HEMATOLOGIC  [x] VTE Prophylaxis: enoxaparin Injectable 40 milliGRAM(s) SubCutaneous daily      LABS  --------------------------------------------------------------------------------------  CBC (10-09 @ 03:42)                          7.9<L>                   25.31<H>  )--------------(  562<H>     90.2<H>% Neuts, 3.8<L>% Lymphs, ANC: 22.84<H>                          24.3<L>    BMP (10-09 @ 21:38)       147<H>  |  107     |  19    			Ca++ --      Ca 13.0<HH>       ---------------------------------( 145<H>		Mg 2.00         3.2<L>  |  23      |  0.59  			Ph 1.0<LL>  BMP (10-09 @ 16:03)       144     |  103     |  20    			Ca++ --      Ca 13.3<HH>       ---------------------------------( 143<H>		Mg 2.00         3.1<L>  |  21<L>   |  0.61  			Ph 1.6<L>    LFTs (10-09 @ 03:42)      TPro 5.4<L> / Alb 2.4<L> / TBili 7.4<H> / DBili -- / <H> / <H> / AlkPhos 117        ABG (10-09 @ 16:03)      /  /  /  /  / %     Lactate:  12.1<HH>  ABG (10-09 @ 10:14)      /  /  /  /  / %     Lactate:  11.8<HH>        -> .Blood Blood-Peripheral Culture (10-07 @ 14:10)     NG    NG    No growth to date.    -> .Blood Blood-Venous Culture (10-07 @ 12:45)     NG    NG    No growth to date.    -> .Blood Blood Culture (10-01 @ 19:04)     NG    NG    No Growth Final    -> .Blood Blood Culture (10-01 @ 18:55)     NG    NG    No Growth Final      -------------------------------------------------------------------------------------- mg = 2 tab, PO, Tablet, qHS  11/01/2017 16:08  primidone (primidone 50 mg oral tablet) 50 mg = 1 tab, PO, Tablet, qAM  11/01/2017 16:08  benazepril (benazepril 10 mg oral tablet) 1 tab(s), PO, Tab, qDay, # 30 tab(s)  09/09/2014 06:37  Review of Systems:  Constitutional: no fever/chills, weight loss, + dizziness  Eyes: denies any visual changes  ENT: no oral lesions or changes in hearing  Respiratory: denies any SOB, cough or wheezing  Cardiovascular: denies any chest pain or palpitations  Gastrointestinal: as per HPI otherwise neg  Genitourinary: denies any dysuria or hematuria  Musculoskeletal: denies any swelling or joint aches/pains  Skin: denies any jaundice, rashes or skin lesions  Neurological: denies any numbness/tingling or muscle weakness  Psychiatric: denies any changes in mood or sleep disturbances  Heme/Lymph: denies any easy bleeding or bruising  Physical Examination:   Vital Signs (last 24 hrs)_____  Last Charted___________  Heart Rate Peripheral   H 106bpm  (JAN 05 12:11)  Resp Rate          18 br/min  (JAN 05 14:30)  SBP      140 mmHg  (JAN 05 14:30)  DBP      81 mmHg  (JAN 05 14:30)    General: pleasant AAM in NAD  Eyes: no scleral icteris, conjunctivae clear  ENT: moist mucous membranes, oropharynx clear  Neck: supple, no LAD, no thyromegaly  Respiratory: CTA B, no wheezes/rales/crackles  Cardiovascular: RRR, no m/r/g  Gastrointestinal: soft, NT/ND, no organomegaly appreciated, +BS  Musculoskeletal: no muscular atrophy, no clubing, cyanosis or edema  Skin: no jaundice, no ecchymosis or skin lesions  Neurologic: alert and oriented  Labs: reviewed  WBC 6.5, hgb 6.0, plt 201  INR 1.2  Assessment/Plan: 59 y/o male with h/o recurrent GI Bleed secondary to gastric ulcer admitted with melena and worsening anemia, likely UGIB from gastric ulcer.  We will plan on resusciatation with pRBCs and tentative EGD in the am.  If he continues to have significant bleeding he may need more urgent EGD.  Continue  protonix gtt.  Keep NPO for now.  Continue to follow hgb and transfuse as needed. His constipation is more of a chronic issue and will be addressed once bleeding has resolved.   Will follow closely.  Thank you for allowing us to participate in this patient's care.  Please do not hesitate to call with questions or concerns.  Tila Green MD, [facility]         SICU Daily Progress Note  =====================================================  24 Hour Interval/Overnight Events:      - IVF switched to 1/2NS @100  - L IJ placed for TPN initiation      66M w/PMHx of HTN, GERD, and recently diagnosed pancreatic mass, s/p robot converted to open Whipple on 9/6 who was transferred from [facility] to [facility] due to worsening nausea and vomiting over the last 5 days. Pt endorses a worsening in appetite and has had decreased PO intake over the last week 2/2 to fear of vomiting. Pt notes emesis to be bilious w/o evidence of blood. Pt denies abdominal and chest pain, fevers, chills, SOB, dizziness. Patient was afebrile, hemodynamically stable on presentation. Labs significant for elevated LFTs (T.bili 7.2,  / , AlkPhos 267). CT AP at [facility] demonstrated extensive metastatic disease in the jude hepatis, celiac axis, and peritoneal cavity with likely liver metastases and severe intrahepatic biliary dilatation.       --------------------------------------------------------------------------------------  Allergies: hydrALAZINE (Vomiting)  hydrALAZINE (Vomiting; Nausea)  No Known Allergies      MEDICATIONS:   --------------------------------------------------------------------------------------  Neurologic Medications    Respiratory Medications    Cardiovascular Medications    Gastrointestinal Medications  fat emulsion (Fish Oil and Plant Based) 20% Infusion 8.3 mL/Hr IV Continuous <Continuous>  Parenteral Nutrition - Adult 1 Each TPN Continuous <Continuous>  potassium phosphate IVPB 30 milliMole(s) IV Intermittent once  sodium chloride 0.45%. 1000 milliLiter(s) IV Continuous <Continuous>  sodium chloride 0.9% lock flush 10 milliLiter(s) IV Push every 1 hour PRN Pre/post blood products, medications, blood draw, and to maintain line patency    Genitourinary Medications    Hematologic/Oncologic Medications  enoxaparin Injectable 40 milliGRAM(s) SubCutaneous daily    Antimicrobial/Immunologic Medications  piperacillin/tazobactam IVPB.. 3.375 Gram(s) IV Intermittent every 8 hours    Endocrine/Metabolic Medications    Topical/Other Medications  chlorhexidine 4% Liquid 1 Application(s) Topical <User Schedule>    --------------------------------------------------------------------------------------    VITAL SIGNS, INS/OUTS (last 24 hours):  --------------------------------------------------------------------------------------  T(C): 36 (10-09-21 @ 20:00), Max: 36.7 (10-09-21 @ 04:00)  HR: 124 (10-09-21 @ 23:00) (116 - 128)  BP: 134/81 (10-09-21 @ 23:00) (119/65 - 154/72)  BP(mean): 93 (10-09-21 @ 23:00) (77 - 103)  ABP: --  ABP(mean): --  RR: 20 (10-09-21 @ 23:00) (19 - 31)  SpO2: 98% (10-09-21 @ 23:00) (90% - 100%)  Wt(kg): --  CVP(mm Hg): --  CI: --  CAPILLARY BLOOD GLUCOSE      POCT Blood Glucose.: 165 mg/dL (10 Oct 2021 00:47)   N/A      10-08 @ 07:01  -  10-09 @ 07:00  --------------------------------------------------------  IN:    Instillation (mL): 5 mL    IV PiggyBack: 1500 mL    multiple electrolytes Injection Type 1 Bolus: 2000 mL    multiple electrolytes Injection Type 1.: 3150 mL  Total IN: 6655 mL    OUT:    Drain (mL): 112 mL    Instillation (mL): 5 mL    Nasogastric/Oral tube (mL): 0 mL    Voided (mL): 3940 mL  Total OUT: 4057 mL    Total NET: 2598 mL      10-09 @ 07:01  -  10-10 @ 01:08  --------------------------------------------------------  IN:    dextrose 5% + sodium chloride 0.45%: 2100 mL    Fat Emulsion (Fish Oil &amp; Plant Based) 20% Infusion: 33.2 mL    IV PiggyBack: 300 mL    multiple electrolytes Injection Type 1.: 300 mL    Sodium Chloride 0.9% Bolus: 500 mL    TPN (Total Parenteral Nutrition): 126 mL  Total IN: 3359.2 mL    OUT:    Drain (mL): 75 mL    Nasogastric/Oral tube (mL): 150 mL    Voided (mL): 1000 mL  Total OUT: 1225 mL    Total NET: 2134.2 mL        --------------------------------------------------------------------------------------  EXAM  NEUROLOGY  Exam: Normal, NAD, alert, oriented x3, no focal deficits.     HEENT  Exam: Normocephalic, atraumatic, EOMI.     RESPIRATORY  Exam: Lungs clear to auscultation, Normal expansion/effort.    CARDIOVASCULAR  Exam: S1, S2.  Regular rate and rhythm.      GI/NUTRITION  Exam: Abdomen soft, Non-tender, Non-distended.   Current Diet: Diet, NPO  fat emulsion (Fish Oil and Plant Based) 20% Infusion  Parenteral Nutrition - Adult      METABOLIC/FLUIDS/ELECTROLYTES  fat emulsion (Fish Oil and Plant Based) 20% Infusion 8.3 mL/Hr IV Continuous <Continuous>  Parenteral Nutrition - Adult 1 Each TPN Continuous <Continuous>  potassium phosphate IVPB 30 milliMole(s) IV Intermittent once  sodium chloride 0.45%. 1000 milliLiter(s) IV Continuous <Continuous>      HEMATOLOGIC  [x] VTE Prophylaxis: enoxaparin Injectable 40 milliGRAM(s) SubCutaneous daily      LABS  --------------------------------------------------------------------------------------  CBC (10-09 @ 03:42)                          7.9<L>                   25.31<H>  )--------------(  562<H>     90.2<H>% Neuts, 3.8<L>% Lymphs, ANC: 22.84<H>                          24.3<L>    BMP (10-09 @ 21:38)       147<H>  |  107     |  19    			Ca++ --      Ca 13.0<HH>       ---------------------------------( 145<H>		Mg 2.00         3.2<L>  |  23      |  0.59  			Ph 1.0<LL>  BMP (10-09 @ 16:03)       144     |  103     |  20    			Ca++ --      Ca 13.3<HH>       ---------------------------------( 143<H>		Mg 2.00         3.1<L>  |  21<L>   |  0.61  			Ph 1.6<L>    LFTs (10-09 @ 03:42)      TPro 5.4<L> / Alb 2.4<L> / TBili 7.4<H> / DBili -- / <H> / <H> / AlkPhos 117        ABG (10-09 @ 16:03)      /  /  /  /  / %     Lactate:  12.1<HH>  ABG (10-09 @ 10:14)      /  /  /  /  / %     Lactate:  11.8<HH>        -> .Blood Blood-Peripheral Culture (10-07 @ 14:10)     NG    NG    No growth to date.    -> .Blood Blood-Venous Culture (10-07 @ 12:45)     NG    NG    No growth to date.    -> .Blood Blood Culture (10-01 @ 19:04)     NG    NG    No Growth Final    -> .Blood Blood Culture (10-01 @ 18:55)     NG    NG    No Growth Final      --------------------------------------------------------------------------------------

## 2021-10-11 NOTE — PROGRESS NOTE ADULT - REASON FOR ADMISSION
Nausea and Emesis

## 2021-10-11 NOTE — PROGRESS NOTE ADULT - ASSESSMENT
66M PMHx of HTN, GERD, recently diagnosed pancreatic mass, now s/p robotic converted to open Whipple on 8/6 who was transferred from [facility] to [facility] due to worsening nausea and vomiting over the last 5 days and elevated LFT's, found to have extensive metastatic disease with likely liver metastases and severe intrahepatic biliary dilatation, now s/p biliary drain placement on 9/28 2/2 signs suggestive of cholangitis and IR biopsy proven liver metastasis on 10/4.    Plan:  - Continue drainage, monitor output  - Flush drain 10cc NS qd  - continue to trend LFTs     [id number]

## 2021-10-11 NOTE — PROGRESS NOTE ADULT - PROBLEM SELECTOR PLAN 12
Emotional support provided, questions answered.    Thank you for allowing us to participate in your patient's care. Please page [phone] for any questions/concerns.

## 2021-10-11 NOTE — PROGRESS NOTE ADULT - NUTRITIONAL ASSESSMENT
This patient has been assessed with a concern for Malnutrition and has been determined to have a diagnosis/diagnoses of Severe protein-calorie malnutrition.    This patient is being managed with:   Parenteral Nutrition - Adult-  Entered: Oct  9 2021  5:00PM    fat emulsion (Fish Oil and Plant Based) 20% Infusion-[Known as SMOFLIPID 20% Infusion]  20 Gram(s) in IV Solution 100 milliLiter(s) infuse at 8.3 mL/Hr  Dose Rate: 8.3 mL/Hr Infuse Over: 12 Hours  Administration Instructions: Use 1.2 micron in-line filter  Entered: Oct  9 2021  5:00PM    Diet NPO-  Entered: Oct  9 2021 12:48AM    
This patient has been assessed with a concern for Malnutrition and has been determined to have a diagnosis/diagnoses of Severe protein-calorie malnutrition.    This patient is being managed with:   Parenteral Nutrition - Adult-  Entered: Oct 10 2021  5:00PM    fat emulsion (Fish Oil and Plant Based) 20% Infusion-[Known as SMOFLIPID 20% Infusion]  38 Gram(s) in IV Solution 190 milliLiter(s) infuse at 15.8 mL/Hr  Dose Rate: 15.8 mL/Hr Infuse Over: 12 Hours  Administration Instructions: Use 1.2 micron in-line filter  Entered: Oct 10 2021  9:26AM    Diet NPO-  Entered: Oct  9 2021 12:48AM    
Severe protein calorie malnutrition in acute illness/ injury; secondary to poor appetite, weight loss >5% in 1 month and/or >7.5% in 3 months, caloric Intake <50% of nutrition needs >= 5 days, temporal wasting, severe loss of muscle mass/atrophy and loss of body fat stores.     Diet, NPO (10-09-21 @ 00:47) [Active]    fat emulsion (Fish Oil and Plant Based) 20% Infusion 15.8 mL/Hr (15.8 mL/Hr) IV Continuous <Continuous>, 10-11-21 @ 09:27,   Parenteral Nutrition - Adult 1 Each (83 mL/Hr) TPN Continuous <Continuous>, 10-11-21 @ 17:00, , Stop order after: 1 Days      **Greater than 50% of the encounter was spent counseling and/coordination of care on parenteral nutrition. 25 minutes were spent face to face with the patient.
This patient has been assessed with a concern for Malnutrition and has been determined to have a diagnosis/diagnoses of Severe protein-calorie malnutrition.    This patient is being managed with:   Diet Regular-  Free Water Flush Instructions:  chocolate ensures if available  Supplement Feeding Modality:  Oral  Ensure Enlive Cans or Servings Per Day:  1       Frequency:  Three Times a day  Ensure Pudding Cans or Servings Per Day:  1       Frequency:  Two Times a day  Entered: Oct  1 2021  7:54AM

## 2021-10-11 NOTE — PROGRESS NOTE ADULT - ASSESSMENT
67 yo M PMHx of HTN, GERD, recently diagnosed pancreatic mass, now s/p robotic - converted to open Whipple on 9/6, then subsequently transferred to [facility] on 9/26 due to worsening N/V and elevated LFT's. Here at [facility] pt found to have extensive metastatic disease in the jude hepatis, celiac axis, and peritoneal cavity with likely liver metastases and severe intrahepatic biliary dilatation. Pt now s/p biliary drain placement on 9/28 to development of signs suggestive of cholangitis. Pt was started on broad spectrum abx (Zosyn) at that time. Pt was progressing adequately and stable floors since biliary drain placement, however today (10/7) pt was on his way to IR for placement of biliary stent, when he suddenly collapsed. At that time pt was found to be tachycardic to 150s. ECG was obtained which which showed QTc 633. Labs showed pt hypercalcemic to 13, potassium 3.1, and lactate to 10. Abd XR negative for free air. SICU consulted for ongoing management of hypercalcemia and lactic acidosis in setting of sepsis, possible ascending cholangitis, and stage IV metastatic pancreatic CA.   Palliative Care consulted for complex decision making  related to goals of care discussions in the setting of advanced illness

## 2021-10-11 NOTE — GOALS OF CARE CONVERSATION - ADVANCED CARE PLANNING - CONVERSATION DETAILS
Referral to palliative care for complex decision making in the setting of advanced malignancy.   Meeting held with pt's HCP Regina Ellis and pt's nephew James Pederson.   Discussed clinical status with HCP and nephew along with primary/surgical team. Discussions regarding impact of DMT on pt's prognosis. Family understand and acknowledge that given pt's current status Mr. Pederson is not a candidate for DMT or surgical interventions.  HCP and nephew are in agreement that given pt's cancer is not treatable nor reversible they wish to transition pt's care to a comfort centric approach.   HCP and nephew are in agreement with DNR/DNI understanding that the interventions of CPR or mechanical ventilation at the end of pt's life would not improve his prognosis or quality of life. They are in agreement with comfort measures only and agree that continued use of TPN would not benefit Mr. Pederson.  MOLST completed and placed upon pt's medical record.   Support provided to family regarding pt's poor prognosis.  Chaplaincy support offered however declined.

## 2021-10-11 NOTE — PROGRESS NOTE ADULT - SUBJECTIVE AND OBJECTIVE BOX
66y Male s/p left internal/external biliary drain on 9/28/21 in Interventional Radiology. Patient seen and examined bedside resting comfortably. NAD.     T(F): 96.9 (10-11-21 @ 12:00), Max: 99 (10-10-21 @ 20:00)  HR: 130 (10-11-21 @ 15:00) (115 - 130)  BP: 105/77 (10-11-21 @ 15:00) (105/77 - 145/72)  RR: 27 (10-11-21 @ 15:00) (20 - 37)  SpO2: 100% (10-11-21 @ 13:00) (80% - 100%)  Wt(kg): --    LABS:                        7.6    18.55 )-----------( 325      ( 11 Oct 2021 01:21 )             24.2     10-11    141  |  104  |  25<H>  ----------------------------<  186<H>  3.9   |  24  |  0.63    Ca    11.7<H>      11 Oct 2021 01:21  Phos  2.7     10-11  Mg     2.20     10-11    TPro  4.7<L>  /  Alb  2.0<L>  /  TBili  10.6<H>  /  DBili  x   /  AST  192<H>  /  ALT  106<H>  /  AlkPhos  122<H>  10-11      I&O's Detail    10 Oct 2021 07:01  -  11 Oct 2021 07:00  --------------------------------------------------------  IN:    Enteral Tube Flush: 90 mL    Fat Emulsion (Fish Oil &amp; Plant Based) 20% Infusion: 24.9 mL    Fat Emulsion (Fish Oil &amp; Plant Based) 20% Infusion: 158 mL    Instillation (mL): 5 mL    IV PiggyBack: 250 mL    sodium chloride 0.45%: 2000 mL    TPN (Total Parenteral Nutrition): 1499 mL  Total IN: 4026.9 mL    OUT:    Drain (mL): 160 mL    Incontinent per Retracted Penis Pouch (mL): 700 mL    Instillation (mL): 5 mL    Nasogastric/Oral tube (mL): 300 mL    Voided (mL): 1050 mL  Total OUT: 2215 mL    Total NET: 1811.9 mL      11 Oct 2021 07:01  -  11 Oct 2021 16:36  --------------------------------------------------------  IN:    Enteral Tube Flush: 30 mL    IV PiggyBack: 100 mL    sodium chloride 0.45%: 350 mL    TPN (Total Parenteral Nutrition): 581 mL  Total IN: 1061 mL    OUT:    Drain (mL): 10 mL    Incontinent per Retracted Penis Pouch (mL): 600 mL    Nasogastric/Oral tube (mL): 300 mL  Total OUT: 910 mL    Total NET: 151 mL            PHYSICAL EXAM:  General: Nontoxic, in NAD  Biliary drain c/d/i, dark bilious output, flushed with 10cc NS            66y Male s/p left internal/external biliary drain on 9/28/21 in Interventional Radiology. Patient seen and examined bedside resting comfortably. NAD.     T(F): 96.9 (10-11-21 @ 12:00), Max: 99 (10-10-21 @ 20:00)  HR: 130 (10-11-21 @ 15:00) (115 - 130)  BP: 105/77 (10-11-21 @ 15:00) (105/77 - 145/72)  RR: 27 (10-11-21 @ 15:00) (20 - 37)  SpO2: 100% (10-11-21 @ 13:00) (80% - 100%)  Wt(kg): --    LABS:                        7.6    18.55 )-----------( 325      ( 11 Oct 2021 01:21 )             24.2     10-11    141  |  104  |  25<H>  ----------------------------<  186<H>  3.9   |  24  |  0.63    Ca    11.7<H>      11 Oct 2021 01:21  Phos  2.7     10-11  Mg     2.20     10-11    TPro  4.7<L>  /  Alb  2.0<L>  /  TBili  10.6<H>  /  DBili  x   /  AST  192<H>  /  ALT  106<H>  /  AlkPhos  122<H>  10-11      I&O's Detail    10 Oct 2021 07:01  -  11 Oct 2021 07:00  --------------------------------------------------------  IN:    Enteral Tube Flush: 90 mL    Fat Emulsion (Fish Oil &amp; Plant Based) 20% Infusion: 24.9 mL    Fat Emulsion (Fish Oil &amp; Plant Based) 20% Infusion: 158 mL    Instillation (mL): 5 mL    IV PiggyBack: 250 mL    sodium chloride 0.45%: 2000 mL    TPN (Total Parenteral Nutrition): 1499 mL  Total IN: 4026.9 mL    OUT:    Drain (mL): 160 mL    Incontinent per Retracted Penis Pouch (mL): 700 mL    Instillation (mL): 5 mL    Nasogastric/Oral tube (mL): 300 mL    Voided (mL): 1050 mL  Total OUT: 2215 mL    Total NET: 1811.9 mL      11 Oct 2021 07:01  -  11 Oct 2021 16:36  --------------------------------------------------------  IN:    Enteral Tube Flush: 30 mL    IV PiggyBack: 100 mL    sodium chloride 0.45%: 350 mL    TPN (Total Parenteral Nutrition): 581 mL  Total IN: 1061 mL    OUT:    Drain (mL): 10 mL    Incontinent per Retracted Penis Pouch (mL): 600 mL    Nasogastric/Oral tube (mL): 300 mL  Total OUT: 910 mL    Total NET: 151 mL            PHYSICAL EXAM:  General: Nontoxic, in NAD  Chest: on nasal cannula  Abd: Biliary drain c/d/i, dark bilious output, flushed with 10cc NS

## 2021-10-11 NOTE — PROGRESS NOTE ADULT - SUBJECTIVE AND OBJECTIVE BOX
Surgery D Progress Note    Subjective:   Patient seen at bedside this AM. Pt did not endorse chest pain, SOB, fevers/chills, N/V/D/C.    MEDS  Neurologic Medications    Respiratory Medications    Cardiovascular Medications    Gastrointestinal Medications  fat emulsion (Fish Oil and Plant Based) 20% Infusion 15.8 mL/Hr IV Continuous <Continuous>  Parenteral Nutrition - Adult 1 Each TPN Continuous <Continuous>  sodium chloride 0.45%. 1000 milliLiter(s) IV Continuous <Continuous>  sodium chloride 0.9% lock flush 10 milliLiter(s) IV Push every 1 hour PRN Pre/post blood products, medications, blood draw, and to maintain line patency    Genitourinary Medications    Hematologic/Oncologic Medications  enoxaparin Injectable 40 milliGRAM(s) SubCutaneous daily    Antimicrobial/Immunologic Medications  piperacillin/tazobactam IVPB.. 3.375 Gram(s) IV Intermittent every 8 hours    Endocrine/Metabolic Medications    Topical/Other Medications  chlorhexidine 4% Liquid 1 Application(s) Topical <User Schedule>    Objective:  Vital Signs  T(C): 36.7 (10-11-21 @ 04:00), Max: 37.2 (10-10-21 @ 20:00)  HR: 119 (10-11-21 @ 07:00) (115 - 123)  BP: 134/76 (10-11-21 @ 07:00) (107/92 - 145/72)  BP(mean): 90 (10-11-21 @ 07:00) (71 - 109)  ABP: --  ABP(mean): --  RR: 37 (10-11-21 @ 07:00) (15 - 37)  SpO2: 99% (10-11-21 @ 07:00) (80% - 100%)  Wt(kg): --  CVP(mm Hg): --  CI: --  CAPILLARY BLOOD GLUCOSE      POCT Blood Glucose.: 172 mg/dL (11 Oct 2021 01:21)   N/A      10-10 @ 07:01  -  10-11 @ 07:00  --------------------------------------------------------  IN:    Enteral Tube Flush: 90 mL    Fat Emulsion (Fish Oil &amp; Plant Based) 20% Infusion: 24.9 mL    Fat Emulsion (Fish Oil &amp; Plant Based) 20% Infusion: 158 mL    Instillation (mL): 5 mL    IV PiggyBack: 250 mL    sodium chloride 0.45%: 2000 mL    TPN (Total Parenteral Nutrition): 1499 mL  Total IN: 4026.9 mL    OUT:    Drain (mL): 160 mL    Incontinent per Retracted Penis Pouch (mL): 700 mL    Instillation (mL): 5 mL    Nasogastric/Oral tube (mL): 300 mL    Voided (mL): 1050 mL  Total OUT: 2215 mL    Total NET: 1811.9 mL    PHYSICAL EXAM:  Constitutional: A&Ox3, NAD  Respiratory: Unlabored breathing  Abdomen: Soft, nondistended, NTTP. No rebound or guarding. Well healed midline scar. Biliary drain patent with bilious output.  Extremities: WWP, RICHTER spontaneously    Labs:                                            7.6                   Neurophils% (auto):   88.2   (10-11 @ 01:21):    18.55)-----------(325          Lymphocytes% (auto):  4.1                                           24.2                   Eosinphils% (auto):   0.1      Manual%: Neutrophils x    ; Lymphocytes x    ; Eosinophils x    ; Bands%: x    ; Blasts x          10-11    141  |  104  |  25<H>  ----------------------------<  186<H>  3.9   |  24  |  0.63    Ca    11.7<H>      11 Oct 2021 01:21  Phos  2.7     10-11  Mg     2.20     10-11    TPro  4.7<L>  /  Alb  2.0<L>  /  TBili  10.6<H>  /  DBili  x   /  AST  192<H>  /  ALT  106<H>  /  AlkPhos  122<H>  10-11          RECENT CULTURES:  10-07 @ 14:10 .Blood Blood-Peripheral     No growth to date.      10-07 @ 12:45 .Blood Blood-Venous     No growth to date.

## 2021-10-11 NOTE — PROGRESS NOTE ADULT - PROBLEM SELECTOR PLAN 11
A meeting to discuss advance care planning was held today with HCP Regina Ellis and pt's nephew James Pederson . Patient was unable to participate in decision making due to altered mental status.  Discussed goals of care and advance directives including, but not limited to code status.  Decision regarding code status: DNR/DNI   Documentation completed today: NICKY. Please see GOC note.  >46 minutes spent on advanced care planning with family.

## 2021-10-11 NOTE — PROGRESS NOTE ADULT - ATTENDING COMMENTS
I agree with the above detailed interval history, physical, and plan, which I have reviewed and edited where appropriate; also agree with notes/assessment and of the team on service.  I have personally examined the patient, reviewed all data.  The plan is specified below:  The patient is in SICU with diagnosis mentioned in the note.    The SICU team has a constant risk benefit analyzes discussion and coordinating care with the primary team, all consultants, House Staff and PA's.  I was physically present for the key portions of the evaluation and management (E/M) service provided.  66M PMHx of HTN, GERD, recently diagnosed pancreatic cancer stage 4, s/p robotic converted to open Whipple; s/p biliary drain placement in SICU   alert and disoriented   HEENT  Exam: Normocephalic,   RESPIRATORY  Exam: Lungs clear   CARDIOVASCULAR  Exam: Regular rate and rhythm.    GI/NUTRITION  Exam: Abdomen distended, non-tender  VASCULAR  Exam: Extremities warm    Plan:   NEURO: delirium  - onitor mental status    RESPIRATORY: on 4L of NC  - Monitor SpO2 >92%  - CARDIOVASCULAR:  - Monitor hemodynamics  GI/NUTRITION: s/p Whipple, stage 4 pancreatic cancer  - NPO/TPN  - GENITOURINARY/RENAL: hypercalcemia/hypokalemia   - 1/2NS @50 cc/hr  HEMATOLOGIC:  - 40 mg lovenox daily  INFECTIOUS DISEASE: s/p placement of biliary drain/ascending cholangitis  - zosyn   ENDOCRINE:  - monitor glucose    DISPO: SICU, f/u Palliative

## 2021-10-11 NOTE — CHART NOTE - NSCHARTNOTEFT_GEN_A_CORE
Called to evaluate comfort care patient for apnea and asystole on telemetry.    On physical exam patient did not respond to verbal or physical stimuli. No spontaneous respirations.  Absent heart and breath sounds. Absent radial, femoral, and carotid pulses. Pupils are fixed and dilated absent CLARKE, no corneal reflex.  EKG rhythm strip shows asystole.   Patient pronounced dead at 2310. SICU Attending Dr. Collado & primary surgical team notified.  Patient's partner and HCP Regina Ellis immediately notified. She declined coming to visit patient and declined autopsy.    SENA Salazar PA-C Called to evaluate comfort care patient for apnea and asystole on telemetry.    On physical exam patient did not respond to verbal or physical stimuli. No spontaneous respirations.  Absent heart and breath sounds. Absent radial, femoral, and carotid pulses. Pupils are fixed and dilated absent CLARKE, no corneal reflex.  EKG rhythm strip shows asystole.   Patient pronounced dead at 2312. SICU Attending Dr. Collado & primary surgical team notified.  Patient's partner and HCP Regina Ellis immediately notified. She declined coming to visit patient and declined autopsy.    SENA Salazar PA-C Called to evaluate comfort care patient for apnea and asystole on telemetry.    On physical exam patient did not respond to verbal or physical stimuli. No spontaneous respirations.  Absent heart and breath sounds. Absent radial, femoral, and carotid pulses. Pupils are fixed and dilated absent CLARKE, no corneal reflex.  EKG rhythm strip shows asystole.   Patient pronounced dead at 2212. SICU Attending Dr. Collado & primary surgical team notified.  Patient's partner and HCP Regina Ellis immediately notified. She declined coming to visit patient and declined autopsy.    SENA Salazar PA-C

## 2021-10-11 NOTE — PROGRESS NOTE ADULT - PROBLEM SELECTOR PLAN 5
Nutrition recommendations appreciated  Started on TPN this weekend  Per GOC discussion today, no further TPN at this time

## 2021-10-11 NOTE — PROGRESS NOTE ADULT - PROBLEM SELECTOR PLAN 1
Pt with hypercalcemia, likely in setting of malignancy. Noted calcium on admission 9.4 which increased to 15.3 (corrected) on 10/7/21. PTH was appropriately low at 9 on 10/7/21. Received 2 doses of calcitonin 4 units/kg q12h, and on  cc/hr. PTH RP pending. Scr remains wnl. Pt s/p repeat calcitonin 4 units/kg BID and 1 dose of pamidronate 60 mg IV on 10/8/21. Serum calcium gradually improved to 12.4 today. Continue NS , however decrease rate to 75cc/hr given net positive fluid balance. IV Lasix as needed to promote calciuria. Hypercalcemia will gradually improve with bispohonate therapy in the next 48 hours. Can consider Dexamethasone if needed although primarily helps with bony metastatic disease / hypervitaminosis D. Monitor serum calcium, ionized calcium.    Overall prognosis is guarded. GOC currently being addressed.    If you have any questions, please feel free to contact me  Mark Anthony Pratt  Nephrology Fellow  [phone]  (After 5pm or on weekends please page the on-call fellow)
Pt with hypercalcemia, likely in setting of malignancy. Noted calcium on admission 9.4 which increased to 15.3 (corrected) on 10/7/21. PTH was appropriately low at 9 on 10/7/21. Received 2 doses of calcitonin 4 units/kg q12h, and on  cc/hr. PTH RP pending. Scr remains wnl. Pt s/p repeat calcitonin 4 units/kg BID and 1 dose of pamidronate 60 mg IV yesterday. Avoid IV magnesium supplementation given hypercalcemia. Continue NS at 150cc/hr with one additional dose of IV lasix 20mg today. Hypercalcemia will gradually improve with bispohonate therapy in the next 72 hours. Can consider Dexamethasone if needed although primarily helps with bony metastatic disease / hypervitaminosis D. Monitor serum calcium, ionized calcium.    Overall prognosis is guarded. GOC currently being addressed.    If you have any questions, please feel free to contact me  Mark Anthony Pratt  Nephrology Fellow  [phone]  (After 5pm or on weekends please page the on-call fellow)
Pt with hypercalcemia, likely in setting of malignancy. Noted calcium on admission 9.4. increase to 15.3 (corrected) on 10/7/21. PTH was appropriately low at 9 on 10/7/21. Received 2 doses of calcitonin 4 units/kg q12h, and on  cc/hr. PTH RP and vit D work up pending. Scr remains wnl.     Would give another calcitonin 4 units/kg every 12 hours for two more doses, give 1 dose of pamidronate 60 mg IV, and increase NS to 150 cc/hr as lactate trending up. Monitor calcium.     If any questions, please feel free to contact me     Jahaira Merino  Nephrology Fellow  [facility] Pager: [phone]  [facility] Pager: [phone]
Start IV Dilaudid 0.2mg q1 PRN pain/dyspnea  Start IV Robinul 0.4mg q4 PRN secretions  Start IV Ativan 0.25mg q1 PRN anxiety/agitation/ refractory dyspnea/ nausea  Bowel regimen while on opiates: Dulcolax Supp PRN

## 2021-10-11 NOTE — PROGRESS NOTE ADULT - ASSESSMENT
66M PMHx of HTN, GERD, recently diagnosed pancreatic mass, now s/p robotic converted to open Whipple on 8/6 who was transferred from [facility] to [facility] due to worsening nausea and vomiting over the last 5 days and elevated LFT's, found to have extensive metastatic disease with likely liver metastases and severe intrahepatic biliary dilatation, now s/p biliary drain placement on 9/28 2/2 signs suggestive of cholangitis and IR biopsy proven liver metastasis on 10/4. Patient was being transported to IR for biliary stent on 10/7 when he collapsed with tachycardia and prolonged QTC, hypercalcemia to 13, lactate of 10. SICU consulted for ongoing management of hypercalcemia and lactic acidosis in setting of sepsis, possible ascending cholangitis, and stage IV metastatic pancreatic CA.    Plan:  NEURO: s/p collapse (fall) on 10/7 due to "weak legs", no LOC, presently A&O x 4  - no pain at this time  - monitor mental status    RESPIRATORY: on 4L of NC  - Monitor SpO2 >92%  - continuous pulse ox  - OOBTC/IS    CARDIOVASCULAR:  - Monitor hemodynamics  - trend lactate     GI/NUTRITION: s/p Whipple, stage 4 pancreatic cancer  - NPO/TPN Left IJ  - Biliary drain to gravity  - pos BM yest.    GENITOURINARY/RENAL: hypercalcemia/hypokalemia following collapse, hypercalcemia 2/2 progression of disease  - 1/2NS @50 cc/hr  - BMP q6  - Electrolyte repletions PRN, K repleted o/n  - Monitor I/Os  - F/u PTH, PTH related peptide, Vit D 1,25-OH and Vit D 25-OH, ionized calcium    HEMATOLOGIC:  - monitor H&H  - 40 mg lovenox daily    INFECTIOUS DISEASE: s/p placement of biliary drain (9/28) w/ concern for ascending cholangitis  - 9/28 BCx E.coli+, BCx 10/1 NGTD x2  - Biliary cx e.coli and Klebsiella variicola + 9/27  - Continue zosyn (10/6 - )  - Trend wbc count, fever curve    ENDOCRINE:  - monitor glucose    Lines:  biliary drain  PIV  NGT  L IJ     DISPO: SICU   66M PMHx of HTN, GERD, recently diagnosed pancreatic mass, now s/p robotic converted to open Whipple on 8/6 who was transferred from [facility] to [facility] due to worsening nausea and vomiting over the last 5 days and elevated LFT's, found to have extensive metastatic disease with likely liver metastases and severe intrahepatic biliary dilatation, now s/p biliary drain placement on 9/28 2/2 signs suggestive of cholangitis and IR biopsy proven liver metastasis on 10/4. Patient was being transported to IR for biliary stent on 10/7 when he collapsed with tachycardia and prolonged QTC, hypercalcemia to 13, lactate of 10. SICU consulted for ongoing management of hypercalcemia and lactic acidosis in setting of sepsis, possible ascending cholangitis, and stage IV metastatic pancreatic CA.    Plan:  NEURO: s/p collapse (fall) on 10/7 due to "weak legs", no LOC, presently A&O x 4  - no pain at this time  - monitor mental status    RESPIRATORY: on 4L of NC  - Monitor SpO2 >92%  - continuous pulse ox  - OOBTC/IS    CARDIOVASCULAR:  - Monitor hemodynamics  - trend lactate     GI/NUTRITION: s/p Whipple, stage 4 pancreatic cancer  - NPO/TPN  - Biliary drain to gravity    GENITOURINARY/RENAL: hypercalcemia/hypokalemia following collapse, hypercalcemia 2/2 progression of disease  - 1/2NS @50 cc/hr  - BMP q6  - Electrolyte repletions PRN  - Monitor I/Os  - Low PTH, Vit D 1,25-OH WNL, Vit D 25-OH low, ionized calcium high  - F/u PTH related peptide level   - F/u Nephro recs    HEMATOLOGIC:  - monitor H&H  - 40 mg lovenox daily    INFECTIOUS DISEASE: s/p placement of biliary drain (9/28) w/ concern for ascending cholangitis  - 9/28 BCx E.coli+, BCx 10/1 NGTD x2  - Biliary cx e.coli and Klebsiella variicola + 9/27  - Continue zosyn (10/6 - )  - F/u ID  - Trend wbc count, fever curve    ENDOCRINE:  - monitor glucose    Lines:  biliary drain  PIV  NGT  L IJ     DISPO: SICU, f/u Palliative recs

## 2021-10-11 NOTE — PROGRESS NOTE ADULT - PROVIDER SPECIALTY LIST ADULT
Gastroenterology
SICU
Surgery
Heme/Onc
Infectious Disease
Nutrition Support
Palliative Care
Surgery
Heme/Onc
Infectious Disease
Infectious Disease
Intervent Radiology
SICU
Surgery
Intervent Radiology
SICU
SICU
Surgery
Nephrology

## 2021-10-11 NOTE — PROGRESS NOTE ADULT - SUBJECTIVE AND OBJECTIVE BOX
SURGERY PROGRESS NOTE    Subjective:     Vital Signs:  Vital Signs Last 24 Hrs  T(C): 36 (11 Oct 2021 00:00), Max: 37.2 (10 Oct 2021 20:00)  T(F): 96.8 (11 Oct 2021 00:00), Max: 99 (10 Oct 2021 20:00)  HR: 115 (11 Oct 2021 00:00) (115 - 132)  BP: 122/66 (11 Oct 2021 00:00) (107/92 - 171/120)  BP(mean): 71 (11 Oct 2021 00:00) (71 - 134)  RR: 34 (11 Oct 2021 00:00) (15 - 36)  SpO2: 100% (11 Oct 2021 00:00) (95% - 100%)    Physical Exam:  General:   Lungs:   CV:   Abdomen:  Extremities:

## 2021-10-11 NOTE — PROGRESS NOTE ADULT - PROBLEM SELECTOR PLAN 2
Patient with prolonged QTC; would avoid Reglan and Zofran for now  Start IV Ativan 0.25mg q1 PRN anxiety/agitation/ refractory dyspnea/ nausea

## 2021-10-11 NOTE — PROGRESS NOTE ADULT - PROBLEM SELECTOR PLAN 4
- likely related to sepsis and terminal delirium   -Frequent reassurance and verbal orientation   -Family members or other familiar persons by his bedside.   - Monitor for constipation, urinary retention, pain, hunger, thirst, etc.    - Promote sleep wake cycle and reorientation as indicated.

## 2021-10-11 NOTE — PROGRESS NOTE ADULT - SUBJECTIVE AND OBJECTIVE BOX
SICU Daily Progress Note  =====================================================  Interval/Overnight Events:   - Patient with declining mental status and more confusion  - Started on TPN    HPI: 66M w/PMHx of HTN, GERD, and recently diagnosed pancreatic mass, s/p robot converted to open Whipple on 9/6 who was transferred from [facility] to [facility] due to worsening nausea and vomiting over the last 5 days. Pt endorses a worsening in appetite and has had decreased PO intake over the last week 2/2 to fear of vomiting. Pt notes emesis to be bilious w/o evidence of blood. Pt denies abdominal and chest pain, fevers, chills, SOB, dizziness. Patient was afebrile, hemodynamically stable on presentation. Labs significant for elevated LFTs (T.bili 7.2,  / , AlkPhos 267). CT AP at [facility] demonstrated extensive metastatic disease in the jude hepatis, celiac axis, and peritoneal cavity with likely liver metastases and severe intrahepatic biliary dilatation.     Allergies: hydrALAZINE (Vomiting)  hydrALAZINE (Vomiting; Nausea)  No Known Allergies      MEDICATIONS:   --------------------------------------------------------------------------------------  Neurologic Medications    Respiratory Medications    Cardiovascular Medications    Gastrointestinal Medications  fat emulsion (Fish Oil and Plant Based) 20% Infusion 15.8 mL/Hr IV Continuous <Continuous>  Parenteral Nutrition - Adult 1 Each TPN Continuous <Continuous>  sodium chloride 0.45%. 1000 milliLiter(s) IV Continuous <Continuous>  sodium chloride 0.9% lock flush 10 milliLiter(s) IV Push every 1 hour PRN Pre/post blood products, medications, blood draw, and to maintain line patency    Genitourinary Medications    Hematologic/Oncologic Medications  enoxaparin Injectable 40 milliGRAM(s) SubCutaneous daily    Antimicrobial/Immunologic Medications  piperacillin/tazobactam IVPB.. 3.375 Gram(s) IV Intermittent every 8 hours    Endocrine/Metabolic Medications    Topical/Other Medications  chlorhexidine 4% Liquid 1 Application(s) Topical <User Schedule>    --------------------------------------------------------------------------------------    VITAL SIGNS, INS/OUTS (last 24 hours):  --------------------------------------------------------------------------------------  ((Insert SICU Vitals/Is+Os here))***  --------------------------------------------------------------------------------------    EXAM  NEUROLOGY  Exam: Normal, NAD, awake, alert and disoriented     HEENT  Exam: Normocephalic, atraumatic, EOMI.    RESPIRATORY  Exam: Lungs clear to auscultation, Normal expansion/effort.     CARDIOVASCULAR  Exam: S1, S2.  Regular rate and rhythm.      GI/NUTRITION  Exam: Abdomen distended, non-tender  Current Diet:  NPO    VASCULAR  Exam: Extremities warm, pink, well-perfused.     MUSCULOSKELETAL  Exam: All extremities moving spontaneously without limitations.    METABOLIC/FLUIDS/ELECTROLYTES  fat emulsion (Fish Oil and Plant Based) 20% Infusion 15.8 mL/Hr IV Continuous <Continuous>  Parenteral Nutrition - Adult 1 Each TPN Continuous <Continuous>  sodium chloride 0.45%. 1000 milliLiter(s) IV Continuous <Continuous>    HEMATOLOGIC  [x] VTE Prophylaxis: enoxaparin Injectable 40 milliGRAM(s) SubCutaneous daily    Transfusions:	[] PRBC	[] Platelets		[] FFP	[] Cryoprecipitate    INFECTIOUS DISEASE  Antimicrobials/Immunologic Medications:  piperacillin/tazobactam IVPB.. 3.375 Gram(s) IV Intermittent every 8 hours    Day #  5    Tubes/Lines/Drains    [x] Peripheral IV  [x] Central Venous Line     	[] R	[x] L	[x] IJ	[] Fem	[] SC	Date Placed:   [] Arterial Line		[] R	[] L	[] Fem	[] Rad	[] Ax	Date Placed:   [] PICC		[] Midline		[] Mediport  [] Urinary Catheter		Date Placed:   [x] Necessity of urinary, arterial, and venous catheters discussed    LABS  --------------------------------------------------------------------------------------  ((Insert SICU Labs here))***  --------------------------------------------------------------------------------------    OTHER LABORATORY:     IMAGING STUDIES:   CXR:  SICU Daily Progress Note  =====================================================  Interval/Overnight Events:   - Patient with declining mental status and more confusion  - Started on TPN    HPI: 66M w/PMHx of HTN, GERD, and recently diagnosed pancreatic mass, s/p robot converted to open Whipple on 9/6 who was transferred from [facility] to [facility] due to worsening nausea and vomiting over the last 5 days. Pt endorses a worsening in appetite and has had decreased PO intake over the last week 2/2 to fear of vomiting. Pt notes emesis to be bilious w/o evidence of blood. Pt denies abdominal and chest pain, fevers, chills, SOB, dizziness. Patient was afebrile, hemodynamically stable on presentation. Labs significant for elevated LFTs (T.bili 7.2,  / , AlkPhos 267). CT AP at [facility] demonstrated extensive metastatic disease in the jude hepatis, celiac axis, and peritoneal cavity with likely liver metastases and severe intrahepatic biliary dilatation.     Allergies: hydrALAZINE (Vomiting)  hydrALAZINE (Vomiting; Nausea)  No Known Allergies      MEDICATIONS:   --------------------------------------------------------------------------------------  Neurologic Medications    Respiratory Medications    Cardiovascular Medications    Gastrointestinal Medications  fat emulsion (Fish Oil and Plant Based) 20% Infusion 15.8 mL/Hr IV Continuous <Continuous>  Parenteral Nutrition - Adult 1 Each TPN Continuous <Continuous>  sodium chloride 0.45%. 1000 milliLiter(s) IV Continuous <Continuous>  sodium chloride 0.9% lock flush 10 milliLiter(s) IV Push every 1 hour PRN Pre/post blood products, medications, blood draw, and to maintain line patency    Genitourinary Medications    Hematologic/Oncologic Medications  enoxaparin Injectable 40 milliGRAM(s) SubCutaneous daily    Antimicrobial/Immunologic Medications  piperacillin/tazobactam IVPB.. 3.375 Gram(s) IV Intermittent every 8 hours    Endocrine/Metabolic Medications    Topical/Other Medications  chlorhexidine 4% Liquid 1 Application(s) Topical <User Schedule>    --------------------------------------------------------------------------------------    VITAL SIGNS, INS/OUTS (last 24 hours):  --------------------------------------------------------------------------------------  ICU Vital Signs Last 24 Hrs  T(C): 36.1 (11 Oct 2021 08:00), Max: 37.2 (10 Oct 2021 20:00)  T(F): 97 (11 Oct 2021 08:00), Max: 99 (10 Oct 2021 20:00)  HR: 121 (11 Oct 2021 10:00) (115 - 123)  BP: 128/76 (11 Oct 2021 10:00) (110/74 - 145/72)  BP(mean): 89 (11 Oct 2021 10:00) (71 - 109)  ABP: --  ABP(mean): --  RR: 20 (11 Oct 2021 10:00) (20 - 37)  SpO2: 99% (11 Oct 2021 10:00) (80% - 100%)    --------------------------------------------------------------------------------------    EXAM  NEUROLOGY  Exam: Normal, NAD, awake, alert and disoriented     HEENT  Exam: Normocephalic, atraumatic, EOMI.    RESPIRATORY  Exam: Lungs clear to auscultation, Normal expansion/effort.     CARDIOVASCULAR  Exam: S1, S2.  Regular rate and rhythm.      GI/NUTRITION  Exam: Abdomen distended, non-tender  Current Diet:  NPO    VASCULAR  Exam: Extremities warm, pink, well-perfused.     MUSCULOSKELETAL  Exam: All extremities moving spontaneously without limitations.    METABOLIC/FLUIDS/ELECTROLYTES  fat emulsion (Fish Oil and Plant Based) 20% Infusion 15.8 mL/Hr IV Continuous <Continuous>  Parenteral Nutrition - Adult 1 Each TPN Continuous <Continuous>  sodium chloride 0.45%. 1000 milliLiter(s) IV Continuous <Continuous>    HEMATOLOGIC  [x] VTE Prophylaxis: enoxaparin Injectable 40 milliGRAM(s) SubCutaneous daily    Transfusions:	[] PRBC	[] Platelets		[] FFP	[] Cryoprecipitate    INFECTIOUS DISEASE  Antimicrobials/Immunologic Medications:  piperacillin/tazobactam IVPB.. 3.375 Gram(s) IV Intermittent every 8 hours    Day #  5    Tubes/Lines/Drains    [x] Peripheral IV  [x] Central Venous Line     	[] R	[x] L	[x] IJ	[] Fem	[] SC	Date Placed:   [] Arterial Line		[] R	[] L	[] Fem	[] Rad	[] Ax	Date Placed:   [] PICC		[] Midline		[] Mediport  [] Urinary Catheter		Date Placed:   [x] Necessity of urinary, arterial, and venous catheters discussed    LABS  --------------------------------------------------------------------------------------                        7.6    18.55 )-----------( 325      ( 11 Oct 2021 01:21 )             24.2     10-11    141  |  104  |  25<H>  ----------------------------<  186<H>  3.9   |  24  |  0.63    Ca    11.7<H>      11 Oct 2021 01:21  Phos  2.7     10-11  Mg     2.20     10-11    TPro  4.7<L>  /  Alb  2.0<L>  /  TBili  10.6<H>  /  DBili  x   /  AST  192<H>  /  ALT  106<H>  /  AlkPhos  122<H>  10-11    --------------------------------------------------------------------------------------

## 2021-10-11 NOTE — PROGRESS NOTE ADULT - ASSESSMENT
· Assessment      66 M with Hx HTN, GERD, and recently diagnosed pancreatic mass, s/p robot converted to open Whipple on 9/6/21 transferred from [facility] for elevated LFTs, nausea, and emesis, found to have extensive metastatic disease in post-surgical site, likely due to rapid recurrence, with c/f biliary obstruction; now s/p biliary drain placement 9/28 2/2 signs & symptoms cholangitis. Transferred to SICU 10/7 for symptomatic hypercalcemia & hemodynamic instability.    Plan:  - Palliative GoC discussion today   - Pain control PRN  - Continue compazine and baclofen  - Continue home meds as ordered  - Regular diet  - F/u PT recs  - DVT ppx: LVX  - Appreciate SICU care    D-Team Surgery  [id number]

## 2021-10-11 NOTE — PROGRESS NOTE ADULT - ATTENDING COMMENTS
66M PMHx of HTN, GERD, recently diagnosed pancreatic mass, now s/p robotic converted to open Whipple on 8/6 who was transferred from [facility] to [facility] due to worsening nausea and vomiting over the last 5 days and elevated LFT's, found to have extensive metastatic disease with likely liver metastases and severe intrahepatic biliary dilatation, now s/p biliary drain placement on 9/28 2/2 signs suggestive of cholangitis and IR biopsy proven liver metastasis on 10/4.    Plan:  - Continue drainage, monitor output  - Flush drain 10cc NS qd  - continue to trend LFTs

## 2021-10-11 NOTE — CHART NOTE - NSCHARTNOTESELECT_GEN_ALL_CORE
Chart Note/Nutrition Services
Event Note
Event Note
IR PRE PROCEDURE NOTE
Post-Procedure Check
Pre-IR Procedure Note
Surgery
Event Note
Expiration
Follow Up/Nutrition Services
Oncology/Off Service Note
Post-op/Event Note
Pre-IR Note
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
## 2021-10-11 NOTE — PROGRESS NOTE ADULT - PROBLEM SELECTOR PLAN 8
- s/p perc biliary drain placement on 9/28  - ID recommendations appreciated   - management as per ID and primary team.

## 2021-10-11 NOTE — PROGRESS NOTE ADULT - CRITICAL CARE SERVICES PROVIDED
Critical care services provided [Time Spent: ___ minutes] : I have spent [unfilled] minutes of time on the encounter.

## 2021-10-11 NOTE — DISCHARGE NOTE FOR THE EXPIRED PATIENT - HOSPITAL COURSE
66M w/PMHx of HTN, GERD, and recently diagnosed pancreatic mass, s/p robot converted to open Whipple on  who was transferred from [facility] to [facility] due to worsening nausea and vomiting over the last 5 days. Pt endorses a worsening in appetite and has had decreased PO intake over the last week 2/2 to fear of vomiting. Pt notes emesis to be bilious w/o evidence of blood. Pt denies abdominal and chest pain, fevers, chills, SOB, dizziness. Patient was afebrile, hemodynamically stable on presentation. Labs significant for elevated LFTs (T.bili 7.2,  / , AlkPhos 267). CT AP at [facility] demonstrated extensive metastatic disease in the jude hepatis, celiac axis, and peritoneal cavity with likely liver metastases and severe intrahepatic biliary dilatation. He underwent s/p biliary drain placement on  2/2 signs suggestive of cholangitis and IR biopsy proven liver metastasis on 10/4. Patient was being transported to  for biliary stent on 10/7 when he collapsed with tachycardia and prolonged QTC, hypercalcemia to 13, lactate of 10. SICU consulted for ongoing management of hypercalcemia and lactic acidosis in setting of sepsis, possible ascending cholangitis, and stage IV metastatic pancreatic CA. Patient in discussions with his medical oncologist, surgical oncologist and partner/HCP Regina decided to focus on comfort measures. On 10/11 he became apneic, asystolic and was pronounced  at 22:12.

## 2021-10-11 NOTE — PROGRESS NOTE ADULT - SUBJECTIVE AND OBJECTIVE BOX
DNR/DNI  COmfort measures.    Dilaudid 0.2mg IV q1 PRN pain/dyspnea  Ativan 0.25mg IV q1 PRN anxiety/agitation/ refractory dyspnea/ nausea  Dulcolax Supp PRN SUBJECTIVE AND OBJECTIVE:  Patient seen at bedside. Patient lethargic and delirious.     INTERVAL HPI/OVERNIGHT EVENTS:  Patient transferred to SICU for further management. Patient started on TPN    DNR on chart:   Allergies    No Known Allergies    Intolerances    hydrALAZINE (Vomiting)  hydrALAZINE (Vomiting; Nausea)  MEDICATIONS  (STANDING):  morphine  Infusion 0.5 mG/Hr (0.5 mL/Hr) IV Continuous <Continuous>    MEDICATIONS  (PRN):  bisacodyl Suppository 10 milliGRAM(s) Rectal daily PRN Constipation  LORazepam   Injectable 0.25 milliGRAM(s) IV Push every 1 hour PRN Anxiety      ITEMS UNCHECKED ARE NOT PRESENT    PRESENT SYMPTOMS: [ x]Unable to obtain due to poor mentation   Source if other than patient:  [ ]Family   [ ]Team     Pain:  [ ]yes [ ]no  QOL impact -   Location -                    Aggravating factors -  Quality -  Radiation -  Timing-  Severity (0-10 scale):  Minimal acceptable level (0-10 scale):     Dyspnea:                           [ ]Mild [ ]Moderate [ ]Severe  Anxiety:                             [ ]Mild [ ]Moderate [ ]Severe  Agitation:                          [ ]Mild [ ]Moderate [ ]Severe  Fatigue:                             [ ]Mild [ ]Moderate [ ]Severe  Nausea:                             [ ]Mild [ ]Moderate [ ]Severe  Loss of appetite:              [ ]Mild [ ]Moderate [ ]Severe  Constipation:                   [ ]Mild [ ]Moderate [ ]Severe  Diarrhea:                          [ ]Mild [ ]Moderate [ ]Severe      CPOT:    https://www.[facility].org/getattachment/[email or web address]/Critical-Care-Pain-Observation-Tool-(CPOT)    PAIN AD Score:	3  http://geriatrictoolkit.[place].[facility]/cog/painad.pdf (Ctrl + left click to view)    Other Symptoms:  [ ]All other review of systems negative - unable to obtain due to mental status    Palliative Performance Status Version 2:    30     %      http://npcrc.org/files/news/palliative_performance_scale_ppsv2.pdf    PHYSICAL EXAM:  Vital Signs Last 24 Hrs  T(C): 36.3 (11 Oct 2021 20:00), Max: 36.7 (11 Oct 2021 04:00)  T(F): 97.3 (11 Oct 2021 20:00), Max: 98.1 (11 Oct 2021 04:00)  HR: 116 (11 Oct 2021 21:30) (115 - 130)  BP: 107/66 (11 Oct 2021 20:00) (105/77 - 145/72)  BP(mean): 76 (11 Oct 2021 20:00) (71 - 109)  RR: 42 (11 Oct 2021 21:30) (20 - 42)  SpO2: 96% (11 Oct 2021 21:30) (80% - 100%)     I&O's Summary    10 Oct 2021 07:01  -  11 Oct 2021 07:00  --------------------------------------------------------  IN: 4026.9 mL / OUT: 2215 mL / NET: 1811.9 mL    11 Oct 2021 07:01  -  11 Oct 2021 23:10  --------------------------------------------------------  IN: 1463 mL / OUT: 1310 mL / NET: 153 mL       GENERAL:  [ ]Alert  [ ]Oriented   [x ]Lethargic  [ ]Cachexia  [ ]Unarousable  [ ]Verbal  [ x]Non-Verbal    Behavioral:   [ ] Anxiety  [ ] Delirium [ ] Agitation [ ] Calm  [x ] Other- encephalopathy  HEENT:  [ ]Normal  [ ] Temporal Wasting  [ x]Dry mouth   [ ]ET Tube/Trach  [ ]Oral lesions  [ ] Mucositis  PULMONARY:   [ ]Clear [ x]Tachypnea  [ ]Audible excessive secretions   [ ]Rhonchi        [ ]Right [ ]Left [ ]Bilateral  [ ]Crackles        [ ]Right [ ]Left [ ]Bilateral  [ ]Wheezing     [ ]Right [ ]Left [ ]Bilateral  [ x]Diminished breath sounds [ ]right [ ]left [ x]bilateral  CARDIOVASCULAR:    [ ]Regular [ ]Irregular [x ]Tachy  [ ][place] [ ]Murmur [ ]Other  GASTROINTESTINAL: Biliary drain patent with bilious output.  [x ]Soft  [x ] mildly Distended   [ ]+BS  [x ]Non tender [ ]Tender  [ ]PEG [ ]OGT/ NGT  Last BM: 10/5  GENITOURINARY:  [ ]Normal [x ] Incontinent   [ ]Oliguria/Anuria   [ ][place]  MUSCULOSKELETAL:   [ ]Normal   [x ]Weakness  [x ]Bed/Wheelchair bound [ ]Edema  [  ] amputation  [  ] contraction  NEUROLOGIC:   [ ]No focal deficits  [ ]Cognitive impairment  [ ]Dysphagia [ ]Dysarthria [ ]Paresis [x ]Other - encephalopathy  SKIN: Abdominal incision site See Nursing Skin Assessment for further details  [ ]Normal    [ ]Rash  [ ]Pressure ulcer(s)       Present on admission [ ]y [ ]n   [  ]  Wound    [  ] hyperpigmentation    CRITICAL CARE:  [ ]Shock Present  [ ]Septic [ ]Cardiogenic [ ]Neurologic [ ]Hypovolemic  [ ]Vasopressors [ ]Inotropes  [ ]Respiratory failure present [ ]Mechanical Ventilation [ ]Non-invasive ventilatory support [ ]High-Flow   [ ]Acute  [ ]Chronic [ ]Hypoxic  [ ]Hypercarbic [ ]Other  [ ]Other organ failure     LABS:                        7.6    18.55 )-----------( 325      ( 11 Oct 2021 01:21 )             24.2   10-11    141  |  104  |  25<H>  ----------------------------<  186<H>  3.9   |  24  |  0.63    Ca    11.7<H>      11 Oct 2021 01:21  Phos  2.7     10-11  Mg     2.20     10-11    TPro  4.7<L>  /  Alb  2.0<L>  /  TBili  10.6<H>  /  DBili  x   /  AST  192<H>  /  ALT  106<H>  /  AlkPhos  122<H>  10-11      CAPILLARY BLOOD GLUCOSE      POCT Blood Glucose.: 162 mg/dL (11 Oct 2021 12:26)  POCT Blood Glucose.: 172 mg/dL (11 Oct 2021 01:21)      RADIOLOGY & ADDITIONAL STUDIES:  CXRAY 10/9/2021  IMPRESSION:  Status post left IJ line placement.    Protein Calorie Malnutrition Present: [ ]mild [ ]moderate [x ]severe [ ]underweight [ ]morbid obesity  https://www.andeal.org/vault/5004/web/files/ONC/Table_Clinical%20Characteristics%20to%20Document%20Malnutrition-White%20JV%20et%20al%202012.pdf    Height (cm): 167.6 (09-26-21 @ 17:26), 167.6 (08-06-21 @ 06:45), 165.1 (07-28-21 @ 14:06)  Weight (kg): 73.3 (09-25-21 @ 22:50), 87.1 (08-06-21 @ 06:45), 88 (07-28-21 @ 14:06)  BMI (kg/m2): 26.1 (09-26-21 @ 17:26), 26.1 (09-25-21 @ 22:50), 31 (08-06-21 @ 06:45)    [x ]PPSV2 < or = 30%  [ ]significant weight loss [ ]poor nutritional intake [ ]anasarca    [ ]Artificial Nutrition    REFERRALS:   [ ]Chaplaincy  [ ]Hospice  [ ]Child Life  [ ]Social Work  [ x]Case management [ ]Holistic Therapy     Goals of Care Document:    Goals of Care Conversation:   {[id number]} Participants:  · Participants  Family; Staff; pt unable to participate secondary to medical debility    · Friend  Regina Ellis    · Provider  Dr. Soto; ICU PA; surgery B team PA    ·   Pura Cummings      {[id number]} Advance Directives:  · Does patient have Advance Directive  Yes    · Indicate Type  Health Care Proxy (HCP)    · Agent's Name  Regina Ellis    · Phone #  [phone] or [phone]    · Are any of the items on the chart  Yes    · Specify which ones are on chart  Health Care Proxy (HCP)    · Caregiver:  yes    · Name  Regina Ellis    · Phone Number  [phone]      {[id number]} Conversation Discussion:  · Conversation  Diagnosis; Prognosis; MOLST Discussed    · Conversation Details  Referral to palliative care for complex decision making in the setting of advanced malignancy.   Meeting held with pt's HCP Regina Ellis and pt's nephew James Pederson.   Discussed clinical status with HCP and nephew along with primary/surgical team. Discussions regarding impact of DMT on pt's prognosis. Family understand and acknowledge that given pt's current status Mr. Pederson is not a candidate for DMT or surgical interventions.  HCP and nephew are in agreement that given pt's cancer is not treatable nor reversible they wish to transition pt's care to a comfort centric approach.   HCP and nephew are in agreement with DNR/DNI understanding that the interventions of CPR or mechanical ventilation at the end of pt's life would not improve his prognosis or quality of life. They are in agreement with comfort measures only and agree that continued use of TPN would not benefit Mr. Pederson.  MOLST completed and placed upon pt's medical record.   Support provided to family regarding pt's poor prognosis.  Chaplaincy support offered however declined.      {[id number]} What Matters Most To Patient and Family:  · What matters most to patient and family  Focus on pt's comfort and quality of life      Personal Advance Directives Treatment Guidelines:   {[id number]} Treatment Guidelines:  · Decision Maker  Health Care Proxy    · Treatment Guidelines  DNR Order; Comfort measures only; No blood draws; NO TPN      {[id number]} MOLST:  · Completed  11-Oct-2021

## 2021-10-11 NOTE — CHART NOTE - NSCHARTNOTEFT_GEN_A_CORE
House Oncology was consulted on this 66M w/PMHx of HTN, GERD, and recently diagnosed pancreatic mass, s/p robot converted to open Whipple on 8/6/21 who was transferred from [facility] to [facility] due to worsening nausea and vomiting found to have extensive metastatic disease.    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     We discussed pt's poor prognosis with patient and spoke with patient's primary oncologist Dr. Boyd at [facility], who states that given deteriorating clinical status, worsening bili/lactate, pt is not a candidate for DMT at this time and recommends palliative care and hospice. This was discussed with pt on 10/8.     Oncology to sign off at this time. Please call back if any questions or concerns.     Judy Medina MD  Hematology Oncology Fellow, PGY-6  [facility] Pager: [phone]/ [facility] Pager: [phone]

## 2021-10-12 LAB
CULTURE RESULTS: SIGNIFICANT CHANGE UP
CULTURE RESULTS: SIGNIFICANT CHANGE UP
SPECIMEN SOURCE: SIGNIFICANT CHANGE UP
SPECIMEN SOURCE: SIGNIFICANT CHANGE UP

## 2021-10-20 LAB — PTH RELATED PROT SERPL-MCNC: 3.8 PMOL/L — SIGNIFICANT CHANGE UP

## 2021-10-27 LAB
CULTURE RESULTS: SIGNIFICANT CHANGE UP
SPECIMEN SOURCE: SIGNIFICANT CHANGE UP

## 2022-01-26 PROBLEM — Z78.9 OTHER SPECIFIED HEALTH STATUS: Chronic | Status: INACTIVE | Noted: 2021-01-01 | Resolved: 2021-01-01

## 2022-04-28 NOTE — H&P PST ADULT - BIRTH SEX
Male Finasteride Pregnancy And Lactation Text: This medication is absolutely contraindicated during pregnancy. It is unknown if it is excreted in breast milk.

## 2022-09-27 NOTE — DIETITIAN INITIAL EVALUATION ADULT. - ETIOLOGY
Received request via: Patient    Was the patient seen in the last year in this department? Yes    Does the patient have an active prescription (recently filled or refills available) for medication(s) requested? No     related to physiological causes

## 2022-10-04 NOTE — H&P ADULT - NSICDXNOPASTMEDICALHX_GEN_ALL_CORE
LOV 09/29/2022  NOV 10/10/2022    CSA 09/08/2022  UDS 09/08/2022   <-- Click to add NO pertinent Past Medical History

## 2022-10-23 NOTE — PROGRESS NOTE ADULT - ASSESSMENT
67 y/o male with hx HTN, GERD, pancreatic mass, s/p biliary stent placement at OSH. Patient now s/p Robot-assisted laparoscopic whipple, converted to open. Tumor noted to be densely adhesed to SMV, making robotic dissection unsafe to continue. (8/6)    PLAN  Neurologic:   - Multimodal pain control with RTC Tylenol and PCA    Respiratory:   - Maintain O2 saturation > 92%    Cardiovascular:   - Hx: HTN; holding home amlodipine while NPO  - c/w Metoprolol 5 q6h   - Trend lactate; 4.8 intraop    Gastrointestinal/Nutrition:   - NPO/NGT  - c/w Protonix daily  - Daily DIANA amylase    Renal/Genitourinary:   - c/w uribe catheter  - monitor I/Os  - replete lytes as needed    Hematologic:   - Trend H/H  - SQH tomorrow for VTE prophylaxis     Infectious Disease:   - c/w Zosyn for periop prophylaxis  - Trend WBC  - Monitor fever curve     Endocrine:  - Monitor glucose on BMP; goal less than 180     Disposition:  SICU    Lines/Tubes: PIV, arterial line, uribe  67 y/o male with hx HTN, GERD, pancreatic mass, s/p biliary stent placement at OSH. Patient now s/p Robot-assisted laparoscopic whipple, converted to open. Tumor noted to be densely adhesed to SMV, making robotic dissection unsafe to continue. (8/6)      Interval/Overnight Events:   -DCd a line  -Is listed for floor     PLAN  Neurologic:   - Multimodal pain control with RTC Tylenol and PCA    Respiratory:   - Maintain O2 saturation > 92%    Cardiovascular:   - Hx: HTN; holding home amlodipine while NPO  - c/w Metoprolol 5 q6h    - Trend lactate; 4.8 intraop, downtrended to 1.8    Gastrointestinal/Nutrition:   - NPO/NGT  - c/w Protonix daily  - Daily DIANA amylase    Renal/Genitourinary:   - c/w uribe catheter  - monitor I/Os, good UOP post op  - replete lytes as needed    Hematologic:   - Trend H/H  - SQH for VTE prophylaxis     Infectious Disease:   - c/w Zosyn for periop prophylaxis  - Trend WBC  - Monitor fever curve     Endocrine:  - Monitor glucose on BMP; goal less than 180     Disposition: Listed for floors    Lines/Tubes: PIV, uribe  Patient with one or more new problems requiring additional work-up/treatment.

## 2023-04-10 NOTE — PROCEDURE NOTE - ATTENDING PROVIDER
Dr. Guevara Clindamycin Pregnancy And Lactation Text: This medication can be used in pregnancy if certain situations. Clindamycin is also present in breast milk.

## 2023-10-01 PROBLEM — K86.89 PANCREATIC MASS: Status: ACTIVE | Noted: 2021-01-01

## 2024-11-25 NOTE — PATIENT PROFILE ADULT - NSASFALLATTEMPTOOB_GEN_A_NUR
----- Message from Esperanza sent at 11/25/2024 10:36 AM CST -----  Regarding: self  Who Called: self    Have you contacted your pharmacy:    Refill traMADoL (ULTRAM) 50 mg tablet    RX Name and Strength:    Preferred Pharmacy with phone number:   [id number] - [place], [address] AT [address]  [place] [address]  Phone: [phone] Fax: [phone]        Local or Mail Order: local    Would the patient rather a call back or a response via [facility]? call    Best Call Back Number:  [phone]      Additional Information:    Thank you.   no

## 2025-03-25 NOTE — PHYSICAL THERAPY INITIAL EVALUATION ADULT - GAIT DISTANCE, PT EVAL
Left message for patient to call back for results and instruction. 20 feet then 10 feet with seated rest break. Limited as pt. wanted to stay in room.